# Patient Record
Sex: MALE | Race: WHITE | NOT HISPANIC OR LATINO | Employment: OTHER | URBAN - NONMETROPOLITAN AREA
[De-identification: names, ages, dates, MRNs, and addresses within clinical notes are randomized per-mention and may not be internally consistent; named-entity substitution may affect disease eponyms.]

---

## 2017-02-08 ENCOUNTER — TELEPHONE (OUTPATIENT)
Dept: CARDIOLOGY | Facility: CLINIC | Age: 82
End: 2017-02-08

## 2017-02-08 NOTE — TELEPHONE ENCOUNTER
----- Message from Cheryl Lawrence sent at 2/8/2017  9:31 AM EST -----  Contact: MAGDALENA (DAUGHTER)  THE PATIENTS DAUGHTER CALLED WANTING TO KNOW IF WE HAD ANY RENEXA SAMPLES FOR HIM.  SHE CAN BE REACHED [phone].  THANKS      Samples provided.

## 2017-03-03 ENCOUNTER — OFFICE VISIT (OUTPATIENT)
Dept: CARDIOLOGY | Facility: CLINIC | Age: 82
End: 2017-03-03

## 2017-03-03 DIAGNOSIS — I49.5 SICK SINUS SYNDROME (HCC): Primary | ICD-10-CM

## 2017-03-03 PROCEDURE — 93288 INTERROG EVL PM/LDLS PM IP: CPT | Performed by: INTERNAL MEDICINE

## 2017-03-08 ENCOUNTER — OFFICE VISIT (OUTPATIENT)
Dept: CARDIOLOGY | Facility: CLINIC | Age: 82
End: 2017-03-08

## 2017-03-08 VITALS
HEART RATE: 84 BPM | DIASTOLIC BLOOD PRESSURE: 55 MMHG | HEIGHT: 68 IN | SYSTOLIC BLOOD PRESSURE: 98 MMHG | WEIGHT: 140.4 LBS | OXYGEN SATURATION: 100 % | BODY MASS INDEX: 21.28 KG/M2

## 2017-03-08 DIAGNOSIS — R42 DIZZINESS: ICD-10-CM

## 2017-03-08 DIAGNOSIS — I20.9 ANGINA PECTORIS (HCC): ICD-10-CM

## 2017-03-08 DIAGNOSIS — I25.10 3-VESSEL CAD: Primary | ICD-10-CM

## 2017-03-08 DIAGNOSIS — R53.82 CHRONIC FATIGUE: ICD-10-CM

## 2017-03-08 DIAGNOSIS — R06.02 SHORTNESS OF BREATH: ICD-10-CM

## 2017-03-08 DIAGNOSIS — E78.5 DYSLIPIDEMIA: ICD-10-CM

## 2017-03-08 DIAGNOSIS — I95.89 OTHER SPECIFIED HYPOTENSION: ICD-10-CM

## 2017-03-08 DIAGNOSIS — I10 ESSENTIAL HYPERTENSION: ICD-10-CM

## 2017-03-08 PROCEDURE — 99214 OFFICE O/P EST MOD 30 MIN: CPT | Performed by: INTERNAL MEDICINE

## 2017-03-08 RX ORDER — ATENOLOL 25 MG/1
12.5 TABLET ORAL DAILY
COMMUNITY
Start: 2017-01-27 | End: 2017-03-08

## 2017-03-08 RX ORDER — SENNOSIDES 8.6 MG
CAPSULE ORAL
COMMUNITY
Start: 2015-08-05 | End: 2018-04-16

## 2017-03-08 RX ORDER — DIMENHYDRINATE 50 MG
100 TABLET ORAL 2 TIMES DAILY
Refills: 0
Start: 2017-03-08 | End: 2022-03-09

## 2017-03-08 RX ORDER — ISOSORBIDE MONONITRATE 30 MG/1
30 TABLET, EXTENDED RELEASE ORAL DAILY
Qty: 30 TABLET | Refills: 11 | Status: SHIPPED | OUTPATIENT
Start: 2017-03-08 | End: 2018-03-13 | Stop reason: SINTOL

## 2017-03-08 NOTE — PROGRESS NOTES
"Subjective   Steven López is a 89 y.o. male     Chief Complaint   Patient presents with   • Follow-up     hosp. f/u       PROBLEM LIST:     1. Coronary artery disease with multiple pci's in the past.  1.1 CABG, March 2003 with LIMA to LAD, saphenous vein graft of first and second posterolateral marginal circumflex.  1.2 Repeat CABG, October 2007 with autologous saphenous vein to the LAD and diagonal vessels.  1.3 Follow up stress in August 2015 demonstrating posterolateral ischemia  1.3 History of multiple percutaneous interventions.  2. 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 Hypertension  4. Dyslipidemia  5. Dizziness   6. Fatigue       Specialty Problems        Cardiology Problems    3-vessel CAD        Angina pectoris        Hypertension        Hypotension                HPI:  Mr. López returns for follow-up on coronary artery disease.    He never stopped atenolol as was requested at the time of his last visit, and he has had intermittent episodes of hypotension.  He was in the emergency room and [facility] last night with dehydration and hypotension.  He was given intravenous fluid resuscitation returned home.    The patient describes episodes of heaviness and numbness in his feet which spread in a symmetric fashion up to his legs and abdomen.  This eventually reaches his chest and he will develop chest pain and shortness of breath typical of his prior angina.  He had these symptoms prior to presenting to the emergency room.    Mr. López is no orthopnea, PND, or edema.  He is only mildly orthostatic symptomatology.  He still complains of generalized weakness and fatigue and exertional dyspnea.  He also relates that his legs were \"draw up\" if he performs any type of lower extremity activity for a prolonged period.                        CURRENT MEDICATION:    Current Outpatient Prescriptions   Medication Sig Dispense Refill "   • acetaminophen (ARTHRITIS PAIN RELIEVER) 650 MG 8 hr tablet Take  by mouth. prn     • ALPRAZolam (XANAX) 0.5 MG tablet Take  by mouth. 1/2 tab PRN     • aspirin (ASPIRIN LOW DOSE) 81 MG EC tablet Take  by mouth daily.     • atorvastatin (LIPITOR) 10 MG tablet daily.     • Calcium-Magnesium-Vitamin D - MG-MG-UNIT tablet sustained-release 24 hour Take  by mouth. Doesn't take every day     • dorzolamide (TRUSOPT) 2 % ophthalmic solution daily.     • HYDROcodone-acetaminophen (NORCO) 5-325 MG per tablet Take  by mouth. prn     • latanoprost (XALATAN) 0.005 % ophthalmic solution Apply 125 mcg to eye daily.     • levothyroxine (SYNTHROID, LEVOTHROID) 50 MCG tablet Take  by mouth daily.     • omeprazole (PriLOSEC) 20 MG capsule daily.     • Polyethylene Glycol 3350 (MIRALAX PO) Take  by mouth Daily As Needed.     • ranolazine (RANEXA) 1000 MG 12 hr tablet Take 1 tablet by mouth every 12 (twelve) hours. 60 tablet 5   • Coenzyme Q10 (CO Q-10) 100 MG capsule Take 100 mg by mouth 2 (Two) Times a Day.  0   • isosorbide mononitrate (IMDUR) 30 MG 24 hr tablet Take 1 tablet by mouth Daily. In the am 30 tablet 11   • nitroglycerin (NITROSTAT) 0.4 MG SL tablet Place  under the tongue.       No current facility-administered medications for this visit.        ALLERGIES:    Penicillins and Sulfa antibiotics    PAST MEDICAL HISTORY:    Past Medical History   Diagnosis Date   • 3-vessel CAD    • Angina pectoris    • Dyslipidemia    • Hypertension        SURGICAL HISTORY:    Past Surgical History   Procedure Laterality Date   • Coronary artery bypass graft       x2   • Prostate surgery     • Tonsillectomy     • Cardiac catheterization     • Coronary stent placement     • Insert / replace / remove pacemaker         SOCIAL HISTORY:    Social History     Social History   • Marital status:      Spouse name: N/A   • Number of children: N/A   • Years of education: N/A     Occupational History   • Not on file.     Social  "History Main Topics   • Smoking status: Former Smoker   • Smokeless tobacco: Not on file   • Alcohol use No   • Drug use: No   • Sexual activity: Not on file     Other Topics Concern   • Not on file     Social History Narrative       FAMILY HISTORY:    Family History   Problem Relation Age of Onset   • Stroke Mother    • Stroke Father    • Other Brother      CABG   • COPD Brother      Pulmonary Disease   • Stroke Other        Review of Systems   Constitutional: Positive for fatigue. Negative for appetite change, chills, diaphoresis and fever.   HENT: Negative.    Eyes: Positive for visual disturbance (wears glasses prn).   Respiratory: Positive for shortness of breath (no orthopnea or PND). Negative for chest tightness.    Cardiovascular: Positive for chest pain (was seen in ER last night at [facility]) and leg swelling (mildly, occas. in ankles).   Gastrointestinal: Negative.  Negative for abdominal pain, blood in stool, constipation, diarrhea, nausea and vomiting.   Endocrine: Negative.  Negative for cold intolerance and heat intolerance.   Genitourinary: Negative.    Musculoskeletal: Positive for arthralgias, gait problem (ambulates with cane) and myalgias.   Skin: Negative.    Allergic/Immunologic: Negative.    Neurological: Positive for dizziness (onset when starting ranexa,  fell last month and broke collar bone ), weakness (pt was dehydrated yesterday was given fluids at [facility], imbalance ) and light-headedness (since started Ranexa).   Hematological: Bruises/bleeds easily.   Psychiatric/Behavioral: Negative.        VISIT VITALS:    Visit Vitals   • BP 98/55 (BP Location: Left arm, Patient Position: Sitting)   • Pulse 84   • Ht 68\" (172.7 cm)   • Wt 140 lb 6.4 oz (63.7 kg)   • SpO2 100%   • BMI 21.35 kg/m2       RECENT LABS:    Objective       Physical Exam   Constitutional: He is oriented to person, place, and time. He appears well-developed and well-nourished. No distress.   HENT:   Head: Normocephalic and " atraumatic.   Eyes: Conjunctivae and EOM are normal. Pupils are equal, round, and reactive to light.   Neck: Normal range of motion. Neck supple. Normal carotid pulses, no hepatojugular reflux and no JVD present. Carotid bruit is not present. No thyroid mass and no thyromegaly present.   Cardiovascular: Intact distal pulses and normal pulses.  Exam reveals distant heart sounds. Exam reveals no gallop, no friction rub and no decreased pulses.    No murmur heard.  Pulmonary/Chest: Effort normal. No respiratory distress. He has decreased breath sounds (moderately decreased breath sounds, exp, phase normal ). He has no wheezes. He has no rales. He exhibits no tenderness.   Abdominal: Soft. Bowel sounds are normal. He exhibits no distension and no mass. There is no tenderness.   Musculoskeletal: Normal range of motion. He exhibits no edema.   Neurological: He is alert and oriented to person, place, and time. He has normal reflexes.   Skin: Skin is warm and dry. No rash noted. He is not diaphoretic. No erythema. No pallor.   Psychiatric: He has a normal mood and affect. His speech is normal and behavior is normal. Judgment and thought content normal. Cognition and memory are normal.   Nursing note and vitals reviewed.      Procedures      Assessment/Plan    #1.  Mr. López continues to have orthostatic hypotension, some of which is iatrogenic in nature.  Therefore, we'll again attempt to stop his atenolol.  I cautioned the patient, his son, and his daughter that this might worsen angina.  However, as Mr. López has angina when his pressures are low, we may actually achieve some improvement by increasing perfusion pressure in the coronary vascular bed.    #2.  Mr. López has minor skin reaction to his nitroglycerin patch.  Therefore we will stop that medication and start isosorbide 30 mg daily uptitrated as tolerated and to affect.    #3.  Because of imbalance and falls we will stop Plavix.    #4.  We will start coenzyme  Q10 supplementation to address the patient's leg cramps.    #5.  Mr. López will continue other medications, will follow-up with Dr. Camargo as instructed, and in our office next week for an a when necessary basis for symptoms or medication intolerance as discussed in detail today.                   Diagnosis Plan   1. 3-vessel CAD     2. Essential hypertension     3. Dyslipidemia     4. Dizziness     5. Chronic fatigue     6. Angina pectoris     7. Other specified hypotension     8. Shortness of breath         Return in about 5 days (around 3/13/2017) for return on monday 03/13/17 to see dr. holder .         Fish Holder MD

## 2017-03-13 ENCOUNTER — OFFICE VISIT (OUTPATIENT)
Dept: CARDIOLOGY | Facility: CLINIC | Age: 82
End: 2017-03-13

## 2017-03-13 VITALS
BODY MASS INDEX: 20.92 KG/M2 | OXYGEN SATURATION: 99 % | DIASTOLIC BLOOD PRESSURE: 56 MMHG | HEART RATE: 74 BPM | WEIGHT: 138 LBS | SYSTOLIC BLOOD PRESSURE: 101 MMHG | HEIGHT: 68 IN

## 2017-03-13 DIAGNOSIS — R53.82 CHRONIC FATIGUE: ICD-10-CM

## 2017-03-13 DIAGNOSIS — I25.10 3-VESSEL CAD: Primary | ICD-10-CM

## 2017-03-13 DIAGNOSIS — I10 ESSENTIAL HYPERTENSION: ICD-10-CM

## 2017-03-13 DIAGNOSIS — I95.89 OTHER SPECIFIED HYPOTENSION: ICD-10-CM

## 2017-03-13 DIAGNOSIS — R42 DIZZINESS: ICD-10-CM

## 2017-03-13 DIAGNOSIS — R06.02 SHORTNESS OF BREATH: ICD-10-CM

## 2017-03-13 DIAGNOSIS — E78.5 DYSLIPIDEMIA: ICD-10-CM

## 2017-03-13 PROCEDURE — 99214 OFFICE O/P EST MOD 30 MIN: CPT | Performed by: INTERNAL MEDICINE

## 2017-04-10 ENCOUNTER — TELEPHONE (OUTPATIENT)
Dept: CARDIOLOGY | Facility: CLINIC | Age: 82
End: 2017-04-10

## 2017-04-10 NOTE — TELEPHONE ENCOUNTER
----- Message from Cheryl Lawrence sent at 4/10/2017 11:41 AM EDT -----  Contact: MAGDALENA  THE PATIENTS DAUGHTER CALLED AND REQUESTED SAMPLES OF RENEXA.  SHE IS COMING TO [place]OWN TOMORROW AND WOULD LIKE TO PICK THEM UP THEN IF WE HAVE THOSE.  SHE CAN BE REACHED [phone].  THANKS      Samples provided.

## 2017-05-18 ENCOUNTER — TELEPHONE (OUTPATIENT)
Dept: CARDIOLOGY | Facility: CLINIC | Age: 82
End: 2017-05-18

## 2017-06-12 ENCOUNTER — TELEPHONE (OUTPATIENT)
Dept: CARDIOLOGY | Facility: CLINIC | Age: 82
End: 2017-06-12

## 2017-06-12 NOTE — TELEPHONE ENCOUNTER
----- Message from Cheryl Lawrence sent at 6/12/2017 10:18 AM EDT -----  Contact: MAGDALENA (DAUGHTER)  THE PATIENTS DAUGHTER CALLED WANTING TO GET RENEXA SAMPLES.  SHE IS COMING TO TOWN LATER TODAY.  SHE CAN BE REACHED [phone].  THANKS      Samples provided for .

## 2017-07-05 ENCOUNTER — TELEPHONE (OUTPATIENT)
Dept: CARDIOLOGY | Facility: CLINIC | Age: 82
End: 2017-07-05

## 2017-07-05 NOTE — TELEPHONE ENCOUNTER
----- Message from Sivakumar Roberson sent at 7/5/2017 10:57 AM EDT -----  Contact: MAGDALENA LAM  DAUGHTER  PT IS REQUESTING SAMPLES OF HIS RANEXA IF AVAILABLE. DAUGHTER WILL BE AT [place] TODAY IF WE SAMPLES ARE AVAILABLE . PLEASE CALL [phone]    Samples provided for patient pickup.

## 2017-08-09 ENCOUNTER — TELEPHONE (OUTPATIENT)
Dept: CARDIOLOGY | Facility: CLINIC | Age: 82
End: 2017-08-09

## 2017-08-09 NOTE — TELEPHONE ENCOUNTER
ranexa 1000 mg samples have been put back in med closet for pat and daughter will pick those up tomorrow.     ----- Message from Valerie Wasserman sent at 8/9/2017  9:43 AM EDT -----  Contact: PT DAUGHTERMAGDALENA  WANTING TO KNOW IF THEY CAN  RANEXA SAMPLES LATER TODAY WHEN THEY ARE IN TOWN. PLEASE CALL [phone]

## 2017-09-11 ENCOUNTER — OFFICE VISIT (OUTPATIENT)
Dept: CARDIOLOGY | Facility: CLINIC | Age: 82
End: 2017-09-11

## 2017-09-11 VITALS
HEIGHT: 68 IN | WEIGHT: 138 LBS | HEART RATE: 78 BPM | DIASTOLIC BLOOD PRESSURE: 59 MMHG | OXYGEN SATURATION: 94 % | BODY MASS INDEX: 20.92 KG/M2 | SYSTOLIC BLOOD PRESSURE: 99 MMHG

## 2017-09-11 DIAGNOSIS — M62.81 MUSCLE WEAKNESS: ICD-10-CM

## 2017-09-11 DIAGNOSIS — I49.5 SSS (SICK SINUS SYNDROME) (HCC): ICD-10-CM

## 2017-09-11 DIAGNOSIS — E78.5 DYSLIPIDEMIA: ICD-10-CM

## 2017-09-11 DIAGNOSIS — I25.10 3-VESSEL CAD: Primary | ICD-10-CM

## 2017-09-11 DIAGNOSIS — I95.89 OTHER SPECIFIED HYPOTENSION: ICD-10-CM

## 2017-09-11 DIAGNOSIS — R26.89 IMBALANCE: ICD-10-CM

## 2017-09-11 DIAGNOSIS — R53.82 CHRONIC FATIGUE: ICD-10-CM

## 2017-09-11 DIAGNOSIS — R42 DIZZINESS: ICD-10-CM

## 2017-09-11 DIAGNOSIS — I10 ESSENTIAL HYPERTENSION: ICD-10-CM

## 2017-09-11 DIAGNOSIS — I20.9 ANGINA PECTORIS (HCC): ICD-10-CM

## 2017-09-11 DIAGNOSIS — M79.605 PAIN IN BOTH LOWER EXTREMITIES: ICD-10-CM

## 2017-09-11 DIAGNOSIS — R06.02 SHORTNESS OF BREATH: ICD-10-CM

## 2017-09-11 DIAGNOSIS — M79.604 PAIN IN BOTH LOWER EXTREMITIES: ICD-10-CM

## 2017-09-11 PROCEDURE — 99214 OFFICE O/P EST MOD 30 MIN: CPT | Performed by: INTERNAL MEDICINE

## 2017-09-11 PROCEDURE — 93280 PM DEVICE PROGR EVAL DUAL: CPT | Performed by: INTERNAL MEDICINE

## 2017-09-11 RX ORDER — PRAVASTATIN SODIUM 20 MG
20 TABLET ORAL DAILY
COMMUNITY
End: 2018-03-13

## 2017-09-11 NOTE — PROGRESS NOTES
Subjective   Steven López is a 89 y.o. male     Chief Complaint   Patient presents with   • Follow-up     patient appears in office today for 6 month follow up with PPM check ([facility])   • Hyperlipidemia     patient and daughter state he had recent blood work (brought wiht them today ) and that cholesterol was WNL, triglycerides were elevated but were better than   • Chest Pain     patient states he does have occasional L sided CP; patient states this pain is fast acting and is at random; occasional Nitro use if it does not resolve on its own   • Shortness of Breath     patient states he has SOA on exertion only ; resolves once he rest   • Pacemaker Check     patient had PPM check today in office    • Dizziness     patient states he still has occasional dizzy episodes while sitting       PROBLEM LIST:     1. Coronary artery disease with multiple pci's in the past.  1.1 CABG, March 2003 with LIMA to LAD, saphenous vein graft of first and second posterolateral marginal circumflex.  1.2 Repeat CABG, October 2007 with autologous saphenous vein to the LAD and diagonal vessels.  1.3 Follow up stress in August 2015 demonstrating posterolateral ischemia  1.3 History of multiple percutaneous interventions.  2. 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 Hypertension  4. Dyslipidemia  5. Dizziness   6. Fatigue          Specialty Problems        Cardiology Problems    3-vessel CAD        Angina pectoris        Hypertension        Hypotension                HPI:  Mr. López returns for follow-up on coronary artery disease, chronic dizziness and fatigue, and cardiac risk factor modification.    From the standpoint of coronary artery disease Mr. López remains stable.  He has angina, on average, about once a week.  This is usually precipitated by emotional stress rather than physical activity.  Symptoms are no more frequent, prolonged, or intense than they have  been at any time in the past.  Symptoms are always relieved by a single nitroglycerin which Mr. López takes frequently, but not always, when he developed chest discomfort.    The patient relates no orthopnea, PND, or lower extremity edema.  He has no palpitations.  Mr. López has persistent orthostatic lightheadedness which initially improved off the atenolol but is returned back to baseline now he's not been presyncopal or syncopal.  He also complains of dizziness.  I'm pursuing that symptom it appears that the patient has more imbalance than true dizziness.  He has had no falls.    Mr. López denies any symptoms of peripheral arterial disease or arterial embolic events.  In particular, he has had no symptoms to suggest TIA or stroke.    The recent labs were reviewed H shows 38 LDL 76.  Creatinine was 1.4.  TSH was normal.                CURRENT MEDICATION:    Current Outpatient Prescriptions   Medication Sig Dispense Refill   • acetaminophen (ARTHRITIS PAIN RELIEVER) 650 MG 8 hr tablet Take  by mouth. prn     • ALPRAZolam (XANAX) 0.5 MG tablet Take  by mouth. 1/2 tab PRN     • aspirin (ASPIRIN LOW DOSE) 81 MG EC tablet Take 81 mg by mouth Daily.     • Calcium-Magnesium-Vitamin D - MG-MG-UNIT tablet sustained-release 24 hour Take  by mouth. Doesn't take every day     • Coenzyme Q10 (CO Q-10) 100 MG capsule Take 100 mg by mouth 2 (Two) Times a Day.  0   • dorzolamide (TRUSOPT) 2 % ophthalmic solution Administer 1 drop to both eyes Daily.     • HYDROcodone-acetaminophen (NORCO) 5-325 MG per tablet Take 1 tablet by mouth Every 8 (Eight) Hours As Needed for Moderate Pain . prn     • isosorbide mononitrate (IMDUR) 30 MG 24 hr tablet Take 1 tablet by mouth Daily. In the am 30 tablet 11   • latanoprost (XALATAN) 0.005 % ophthalmic solution Apply 125 mcg to eye daily.     • levothyroxine (SYNTHROID, LEVOTHROID) 50 MCG tablet Take 50 mcg by mouth Daily.     • nitroglycerin (NITROSTAT) 0.4 MG SL tablet Place   under the tongue.     • omeprazole (PriLOSEC) 20 MG capsule Take 20 mg by mouth Daily.     • Polyethylene Glycol 3350 (MIRALAX PO) Take  by mouth Daily As Needed.     • pravastatin (PRAVACHOL) 20 MG tablet Take 20 mg by mouth Daily.     • ranolazine (RANEXA) 1000 MG 12 hr tablet Take 1 tablet by mouth every 12 (twelve) hours. 60 tablet 5     No current facility-administered medications for this visit.        ALLERGIES:    Penicillins and Sulfa antibiotics    PAST MEDICAL HISTORY:    Past Medical History:   Diagnosis Date   • 3-vessel CAD    • Angina pectoris    • Dyslipidemia    • Hypertension        SURGICAL HISTORY:    Past Surgical History:   Procedure Laterality Date   • CARDIAC CATHETERIZATION     • CORONARY ARTERY BYPASS GRAFT      x2   • CORONARY STENT PLACEMENT     • INSERT / REPLACE / REMOVE PACEMAKER     • PROSTATE SURGERY     • TONSILLECTOMY         SOCIAL HISTORY:    Social History     Social History   • Marital status:      Spouse name: N/A   • Number of children: N/A   • Years of education: N/A     Occupational History   • Not on file.     Social History Main Topics   • Smoking status: Former Smoker   • Smokeless tobacco: Never Used   • Alcohol use No   • Drug use: No   • Sexual activity: Defer     Other Topics Concern   • Not on file     Social History Narrative       FAMILY HISTORY:    Family History   Problem Relation Age of Onset   • Stroke Mother    • Stroke Father    • Other Brother      CABG   • COPD Brother      Pulmonary Disease   • Stroke Other        Review of Systems   Constitutional: Negative for chills, diaphoresis, fatigue and fever.   HENT: Negative.    Eyes: Positive for visual disturbance (wears glasses PRN).   Respiratory: Positive for shortness of breath (SOA on exertion only). Negative for cough, chest tightness and wheezing.    Cardiovascular: Positive for chest pain (occasional L sided chest pain; does not radiate; resolves quickly on its own if not pt will take Nitro ,  "episode frequency once a week, will take nitro once a week ) and leg swelling (occasional BLE swelling). Negative for palpitations.   Gastrointestinal: Negative.  Negative for abdominal pain, blood in stool (no melena, no hematuria, no hematemesis, no hematochezia), constipation, diarrhea, nausea and vomiting.   Endocrine: Negative.  Negative for cold intolerance, heat intolerance, polyphagia and polyuria.   Genitourinary: Positive for frequency (due to overactive bladder ). Negative for difficulty urinating and urgency.   Musculoskeletal: Positive for arthralgias (legs/fingers), gait problem (ambulates with aid of cane) and myalgias. Negative for back pain, neck pain and neck stiffness.   Skin: Negative.  Negative for rash and wound.   Allergic/Immunologic: Negative.  Negative for environmental allergies and food allergies.   Neurological: Positive for dizziness (pt states he is still having occasional episodes of dizziness while sitting and standing, described as imbalance and orthostatic lightheadedness ). Negative for tremors, seizures, syncope, facial asymmetry (no stroke like symptoms), speech difficulty, weakness, light-headedness, numbness and headaches.   Hematological: Negative.  Does not bruise/bleed easily.   Psychiatric/Behavioral: Negative.  Negative for agitation, confusion and sleep disturbance (denies waking up smothering). The patient is not nervous/anxious.        VISIT VITALS:    BP 99/59 (BP Location: Left arm, Patient Position: Sitting)  Pulse 78  Ht 68\" (172.7 cm)  Wt 138 lb (62.6 kg)  SpO2 94%  BMI 20.98 kg/m2    RECENT LABS:    Objective       Physical Exam   Constitutional: He is oriented to person, place, and time. He appears well-developed and well-nourished. No distress.   HENT:   Head: Normocephalic and atraumatic.   Eyes: Conjunctivae, EOM and lids are normal. Pupils are equal, round, and reactive to light. Lids are everted and swept, no foreign bodies found.   Neck: Normal range " of motion. Neck supple. Normal carotid pulses, no hepatojugular reflux and no JVD present. Carotid bruit is not present (normal carotid uptrokes). No tracheal deviation present. No thyroid mass and no thyromegaly present.   Cardiovascular: Normal rate, regular rhythm, S1 normal, S2 normal, normal heart sounds and intact distal pulses.  Exam reveals no gallop, no S3, no S4 and no distant heart sounds.    No murmur heard.  Pulmonary/Chest: Effort normal and breath sounds normal. No respiratory distress. He has no decreased breath sounds. He has no wheezes. He has no rhonchi. He has no rales. He exhibits no tenderness.   Abdominal: Soft. Bowel sounds are normal. He exhibits no distension, no abdominal bruit and no mass. There is no tenderness. There is no rebound and no guarding.   Negative organomegaly      Musculoskeletal: Normal range of motion. He exhibits edema (trace edema LLE). He exhibits no tenderness or deformity.   Lymphadenopathy:     He has no cervical adenopathy.     He has no axillary adenopathy.   Neurological: He is alert and oriented to person, place, and time.   Skin: Skin is warm and dry. No rash noted. No erythema. No pallor.   Psychiatric: He has a normal mood and affect. His speech is normal and behavior is normal. Judgment and thought content normal. Cognition and memory are normal.   Nursing note and vitals reviewed.      Procedures      Assessment/Plan    #1 coronary artery disease status post coronary artery bypass grafting on 2 separate occasions with stable, relatively infrequent angina.  Without high risk markers on stress testing, and was stable symptoms, I don't think that further evaluation warranted.  However, I think we are very limited in our ability to address angina further based on the patient's hypotension and orthostatic symptomatology.    #2.  Orthostatic lightheadedness.  Mr. López will practice physical measures to minimize the risk of orthostasis, and ensure adequate  hydration.    #3.  Imbalance.  I suspect that this is a combination of both peripheral neuropathy is as the patient describes symptoms significant for that pathology) and generalized muscle weakness and atrophy.  I suggested the patient might benefit from neurologic consultation to see if any treatment might result of more benefit than risk of worsening symptoms.    #4.  Therefore, we will continue current medications and close clinical follow-up.  Mr. López understands to report immediately for any change in his angina, orthostatic symptomatology, or any symptoms which might represent TIA or stroke.  He will see Dr. Camargo as instructed through his office, and will follow-up in our office in 6 months or on a when necessary basis as above.                   Diagnosis Plan   1. 3-vessel CAD     2. Other specified hypotension     3. Dyslipidemia     4. Dizziness  Ambulatory Referral to Neurology   5. Chronic fatigue     6. Shortness of breath     7. Angina pectoris     8. Muscle weakness  Ambulatory Referral to Neurology   9. Pain in both lower extremities  Ambulatory Referral to Neurology   10. Imbalance  Ambulatory Referral to Neurology   11. Essential hypertension     12. SSS (sick sinus syndrome)         Return in about 6 months (around 3/11/2018), or if symptoms worsen or fail to improve, for Next scheduled follow up.         Fish Holder MD   Present for Exam and scribed by YAZMIN Hallman

## 2017-09-12 ENCOUNTER — TELEPHONE (OUTPATIENT)
Dept: CARDIOLOGY | Facility: CLINIC | Age: 82
End: 2017-09-12

## 2017-09-12 DIAGNOSIS — M79.605 PAIN IN BOTH LOWER EXTREMITIES: ICD-10-CM

## 2017-09-12 DIAGNOSIS — M62.81 MUSCLE WEAKNESS: Primary | ICD-10-CM

## 2017-09-12 DIAGNOSIS — M79.604 PAIN IN BOTH LOWER EXTREMITIES: ICD-10-CM

## 2017-09-12 DIAGNOSIS — R26.89 IMBALANCE: ICD-10-CM

## 2017-09-12 DIAGNOSIS — R42 DIZZINESS: ICD-10-CM

## 2017-09-12 NOTE — TELEPHONE ENCOUNTER
Spoke with Pauline, pt daughter, she stats Mr. López prefers to see Dr. Rodas and is willing to wait on seeing him. Referral to Dr. Nielson will be cancelled and new order entered for referral to Dr. Rodas.       ----- Message from Concepcion Forrest sent at 9/12/2017 10:20 AM EDT -----  Steven' daughter came by the office this morning and said that he doesn't want to make the trip to [place] to see Neurology. The referral entered yesterday has already been used, Dr. Pagan has him scheduled for Oct. 19. Do they need to cancel this appointment now and a new referral entered for him to see Dr. Rodas?

## 2017-09-13 ENCOUNTER — TELEPHONE (OUTPATIENT)
Dept: CARDIOLOGY | Facility: CLINIC | Age: 82
End: 2017-09-13

## 2017-09-13 NOTE — TELEPHONE ENCOUNTER
----- Message from Concepcion Forrest sent at 9/13/2017  8:48 AM EDT -----  Called Rodas's office to schedule him, they don't have schedules open until November and told me to call back then when they might have them open to schedule patients. I called Pauline and let her know what I was told re: scheduling, she stated she understands and they will decide how to proceed in November when Rodas's schedules will open back up.     ----- Message -----     From: Dolly Underwood MA     Sent: 9/12/2017  11:24 AM       To: Concepcion Forrest    Spoke with Pauline, pt daughter, states Mr. López would like ro proceed with referral to Dr. Rodas. Cancel Dr. Pagan apt. And scheduled with Clark. Patient is willing to wait to see Dr. Rodas.  Thanks       ----- Message -----     From: Concepcion Forrest     Sent: 9/12/2017  10:20 AM       To: Dolly Underwood MA    Steven' daughter came by the office this morning and said that he doesn't want to make the trip to [place] to see Neurology. The referral entered yesterday has already been used, Dr. Pagan has him scheduled for Oct. 19. Do they need to cancel this appointment now and a new referral entered for him to see Dr. Rodas?

## 2017-09-13 NOTE — TELEPHONE ENCOUNTER
A cardiac clearance has been faxed to Dr. Masterson for extraction of skin lesion to the right ear 09/13/17 @ 5:23pm SONNY/YAZMIN

## 2017-10-24 ENCOUNTER — OFFICE VISIT (OUTPATIENT)
Dept: NEUROLOGY | Facility: CLINIC | Age: 82
End: 2017-10-24

## 2017-10-24 ENCOUNTER — LAB (OUTPATIENT)
Dept: LAB | Facility: HOSPITAL | Age: 82
End: 2017-10-24

## 2017-10-24 VITALS
SYSTOLIC BLOOD PRESSURE: 110 MMHG | BODY MASS INDEX: 21.07 KG/M2 | HEIGHT: 68 IN | HEART RATE: 73 BPM | DIASTOLIC BLOOD PRESSURE: 54 MMHG | WEIGHT: 139 LBS | OXYGEN SATURATION: 97 %

## 2017-10-24 DIAGNOSIS — M79.661 PAIN IN BOTH LOWER LEGS: Primary | ICD-10-CM

## 2017-10-24 DIAGNOSIS — M79.661 PAIN IN BOTH LOWER LEGS: ICD-10-CM

## 2017-10-24 DIAGNOSIS — M79.662 PAIN IN BOTH LOWER LEGS: ICD-10-CM

## 2017-10-24 DIAGNOSIS — M79.662 PAIN IN BOTH LOWER LEGS: Primary | ICD-10-CM

## 2017-10-24 LAB
CK SERPL-CCNC: 69 U/L (ref 26–174)
ERYTHROCYTE [SEDIMENTATION RATE] IN BLOOD: 3 MM/HR (ref 0–20)

## 2017-10-24 PROCEDURE — 83519 RIA NONANTIBODY: CPT | Performed by: PSYCHIATRY & NEUROLOGY

## 2017-10-24 PROCEDURE — 82550 ASSAY OF CK (CPK): CPT | Performed by: PSYCHIATRY & NEUROLOGY

## 2017-10-24 PROCEDURE — 86255 FLUORESCENT ANTIBODY SCREEN: CPT | Performed by: PSYCHIATRY & NEUROLOGY

## 2017-10-24 PROCEDURE — 85652 RBC SED RATE AUTOMATED: CPT | Performed by: PSYCHIATRY & NEUROLOGY

## 2017-10-24 PROCEDURE — 99205 OFFICE O/P NEW HI 60 MIN: CPT | Performed by: PSYCHIATRY & NEUROLOGY

## 2017-10-24 PROCEDURE — 36415 COLL VENOUS BLD VENIPUNCTURE: CPT

## 2017-10-24 NOTE — ASSESSMENT & PLAN NOTE
Pain and weakness in LE worsening in last year.    EMG/NCS of the bilateral lower extremities,    Labs     Stop Xanax and Lortab

## 2017-10-24 NOTE — PROGRESS NOTES
Subjective:   Chief Complaint   Patient presents with   • Uncontrollable Muscle Weakness       Patient ID: Steven López is a 89 y.o. male.    History of Present Illness     89 y.o. male referred by Dr Fish Holder for muscle weakness.  LE are weak and have constant N/T in the last year.  Several episodes of legs not wanting to move after sitting.  Taking hydrocodone for arthritis pain.  Sx of LH worsened after taking Ranexa, Pravastatin started in April 2017, after stopping Lipitor.      Reviewed Dr Holder's notes:    CAD s/p CABG and redo, orthostatic LH, DDDR generator, angina once a week,    Labs - CMP, iron, cbc, Vit D, TSH, B12 - NCS       The following portions of the patient's history were reviewed and updated as appropriate:   He  has a past medical history of 3-vessel CAD; Angina pectoris; Arthritis; Dyslipidemia; Glaucoma; and Hypertension.  He  does not have any pertinent problems on file.  He  has a past surgical history that includes Coronary artery bypass graft; ostate surgery; Tonsillectomy; Cardiac catheterization; Coronary stent placement; and Insert / replace / remove pacemaker.  His family history includes COPD in his brother; Other in his brother; Stroke in his father, mother, and other.  He  reports that he has quit smoking. He has never used smokeless tobacco. He reports that he does not drink alcohol or use illicit drugs.  Current Outpatient Prescriptions   Medication Sig Dispense Refill   • acetaminophen (ARTHRITIS PAIN RELIEVER) 650 MG 8 hr tablet Take  by mouth. prn     • ALPRAZolam (XANAX) 0.5 MG tablet Take  by mouth. 1/2 tab PRN     • aspirin (ASPIRIN LOW DOSE) 81 MG EC tablet Take 81 mg by mouth Daily.     • Calcium-Magnesium-Vitamin D - MG-MG-UNIT tablet sustained-release 24 hour Take  by mouth. Doesn't take every day     • Coenzyme Q10 (CO Q-10) 100 MG capsule Take 100 mg by mouth 2 (Two) Times a Day.  0   • dorzolamide (TRUSOPT) 2 % ophthalmic solution Administer 1  drop to both eyes Daily.     • HYDROcodone-acetaminophen (NORCO) 5-325 MG per tablet Take 1 tablet by mouth Every 8 (Eight) Hours As Needed for Moderate Pain . prn     • isosorbide mononitrate (IMDUR) 30 MG 24 hr tablet Take 1 tablet by mouth Daily. In the am 30 tablet 11   • latanoprost (XALATAN) 0.005 % ophthalmic solution Apply 125 mcg to eye daily.     • levothyroxine (SYNTHROID, LEVOTHROID) 50 MCG tablet Take 50 mcg by mouth Daily.     • nitroglycerin (NITROSTAT) 0.4 MG SL tablet Place  under the tongue.     • omeprazole (PriLOSEC) 20 MG capsule Take 20 mg by mouth Daily.     • Polyethylene Glycol 3350 (MIRALAX PO) Take  by mouth Daily As Needed.     • pravastatin (PRAVACHOL) 20 MG tablet Take 20 mg by mouth Daily.     • ranolazine (RANEXA) 1000 MG 12 hr tablet Take 1 tablet by mouth every 12 (twelve) hours. 60 tablet 5     No current facility-administered medications for this visit.      Current Outpatient Prescriptions on File Prior to Visit   Medication Sig   • acetaminophen (ARTHRITIS PAIN RELIEVER) 650 MG 8 hr tablet Take  by mouth. prn   • ALPRAZolam (XANAX) 0.5 MG tablet Take  by mouth. 1/2 tab PRN   • aspirin (ASPIRIN LOW DOSE) 81 MG EC tablet Take 81 mg by mouth Daily.   • Calcium-Magnesium-Vitamin D - MG-MG-UNIT tablet sustained-release 24 hour Take  by mouth. Doesn't take every day   • Coenzyme Q10 (CO Q-10) 100 MG capsule Take 100 mg by mouth 2 (Two) Times a Day.   • dorzolamide (TRUSOPT) 2 % ophthalmic solution Administer 1 drop to both eyes Daily.   • HYDROcodone-acetaminophen (NORCO) 5-325 MG per tablet Take 1 tablet by mouth Every 8 (Eight) Hours As Needed for Moderate Pain . prn   • isosorbide mononitrate (IMDUR) 30 MG 24 hr tablet Take 1 tablet by mouth Daily. In the am   • latanoprost (XALATAN) 0.005 % ophthalmic solution Apply 125 mcg to eye daily.   • levothyroxine (SYNTHROID, LEVOTHROID) 50 MCG tablet Take 50 mcg by mouth Daily.   • nitroglycerin (NITROSTAT) 0.4 MG SL tablet  "Place  under the tongue.   • omeprazole (PriLOSEC) 20 MG capsule Take 20 mg by mouth Daily.   • Polyethylene Glycol 3350 (MIRALAX PO) Take  by mouth Daily As Needed.   • pravastatin (PRAVACHOL) 20 MG tablet Take 20 mg by mouth Daily.   • ranolazine (RANEXA) 1000 MG 12 hr tablet Take 1 tablet by mouth every 12 (twelve) hours.     No current facility-administered medications on file prior to visit.      He is allergic to penicillins and sulfa antibiotics..    Review of Systems   Constitutional: Positive for fatigue. Negative for activity change and unexpected weight change.   HENT: Negative for facial swelling, hearing loss, tinnitus, trouble swallowing and voice change.    Eyes: Negative for photophobia, pain and visual disturbance.   Respiratory: Negative for apnea, cough and choking.    Cardiovascular: Negative for chest pain.   Gastrointestinal: Negative for constipation, nausea and vomiting.   Endocrine: Negative for cold intolerance.   Genitourinary: Negative for difficulty urinating, frequency and urgency.   Musculoskeletal: Positive for gait problem. Negative for arthralgias, back pain, myalgias, neck pain and neck stiffness.   Skin: Negative for rash.   Allergic/Immunologic: Negative for immunocompromised state.   Neurological: Positive for dizziness and weakness. Negative for tremors, seizures, syncope, facial asymmetry, speech difficulty, light-headedness, numbness and headaches.   Hematological: Negative for adenopathy.   Psychiatric/Behavioral: Negative for confusion, decreased concentration, hallucinations and sleep disturbance. The patient is not nervous/anxious.         Objective:  Vitals:    10/24/17 1235   BP: 110/54   Pulse: 73   SpO2: 97%   Weight: 139 lb (63 kg)   Height: 68\" (172.7 cm)       Neurologic Exam     Mental Status   Oriented to person, place, and time.   Registration: recalls 3 of 3 objects. Recall at 5 minutes: recalls 3 of 3 objects. Follows 3 step commands.   Attention: normal. " Concentration: normal.   Speech: speech is normal   Level of consciousness: alert  Knowledge: good and consistent with education.   Able to name object. Able to read. Able to repeat. Able to write. Normal comprehension.     Cranial Nerves     CN II   Visual fields full to confrontation.   Visual acuity: normal  Right visual field deficit: none  Left visual field deficit: none     CN III, IV, VI   Pupils are equal, round, and reactive to light.  Extraocular motions are normal.   Right pupil: Shape: regular. Reactivity: brisk. Consensual response: intact.   Left pupil: Shape: regular. Reactivity: brisk. Consensual response: intact.   Nystagmus: none   Diplopia: none  Ophthalmoparesis: none  Upgaze: normal  Downgaze: normal  Conjugate gaze: present  Vestibulo-ocular reflex: present    CN V   Facial sensation intact.   Right corneal reflex: normal  Left corneal reflex: normal    CN VII   Right facial weakness: none  Left facial weakness: none    CN VIII   Hearing: intact    CN IX, X   Palate: symmetric  Right gag reflex: normal  Left gag reflex: normal    CN XI   Right sternocleidomastoid strength: normal  Left sternocleidomastoid strength: normal    CN XII   Tongue: not atrophic  Fasciculations: absent  Tongue deviation: none    Motor Exam   Muscle bulk: normal  Overall muscle tone: normal  Right arm tone: normal  Left arm tone: normal  Right leg tone: normal  Left leg tone: normal    Strength   Strength 5/5 throughout.   Right iliopsoas: 4/5  Left iliopsoas: 4/5  Right quadriceps: 4/5  Left quadriceps: 4/5  Right hamstrin/5  Left hamstrin/5  Right glutei: 4/5  Left glutei: 4/5  Right anterior tibial: 4/5  Left anterior tibial: 4/5  Right posterior tibial: 4/5  Left posterior tibial: 4/5  Right peroneal: 4/5  Left peroneal: 4/5  Right gastroc: 4/5  Left gastroc: 4/5    Sensory Exam   Light touch normal.   Vibration normal.   Proprioception normal.   Pinprick normal.     Gait, Coordination, and Reflexes      Gait  Gait: shuffling    Coordination   Romberg: negative  Finger to nose coordination: normal  Heel to shin coordination: normal  Tandem walking coordination: abnormal    Tremor   Resting tremor: absent  Intention tremor: absent  Action tremor: absent    Reflexes   Reflexes 2+ except as noted.       Physical Exam   Constitutional: He is oriented to person, place, and time. Vital signs are normal. He appears well-developed and well-nourished.   HENT:   Head: Normocephalic and atraumatic.   Eyes: EOM and lids are normal. Pupils are equal, round, and reactive to light.   Fundoscopic exam:       The right eye shows no exudate, no hemorrhage and no papilledema. The right eye shows venous pulsations.        The left eye shows no exudate, no hemorrhage and no papilledema. The left eye shows venous pulsations.   Neck: Normal range of motion and phonation normal. Normal carotid pulses present. Carotid bruit is not present. No thyroid mass and no thyromegaly present.   Cardiovascular: Normal rate, regular rhythm and normal heart sounds.    Pulmonary/Chest: Effort normal.   Neurological: He is oriented to person, place, and time. He has normal strength. He has an abnormal Tandem Gait Test. He has a normal Finger-Nose-Finger Test, a normal Heel to Velazquez Test and a normal Romberg Test.   Skin: Skin is warm and dry.   Psychiatric: He has a normal mood and affect. His speech is normal.   Nursing note and vitals reviewed.      Assessment/Plan:       Problems Addressed this Visit        Other    Pain in both lower legs - Primary     Pain and weakness in LE worsening in last year.    EMG/NCS of the bilateral lower extremities,    Labs     Stop Xanax and Lortab         Relevant Orders    CK    Myasthenia Gravis Full Panel    Sedimentation Rate    Nerve Conduction Test

## 2017-10-26 ENCOUNTER — TELEPHONE (OUTPATIENT)
Dept: CARDIOLOGY | Facility: CLINIC | Age: 82
End: 2017-10-26

## 2017-10-26 NOTE — TELEPHONE ENCOUNTER
With patient symptoms it is best for patient to proceed with NCS per Dr. Holder. Daughter request is it safe to proceed with dual PPM. I spoke with Nicole Marques and [facility]. Jaylon jane., he states with ppt AP and  > 99% a magnet will probably need to be used and if complications during study will need interrogation after otherwise just routine interrogations as planned. patient daughter verbalize understanding and is to call our office with questions or concerns. 1 sleeve of ranexa 1000 mg have been put back for patient as well.       ----- Message from Tiffani Dugan MA sent at 10/26/2017  5:09 PM EDT -----  Contact: ponce Holder referred patient to neuro. They are wanting to do a nerve conductions. Family is wanting advice before having it done with patient history.

## 2017-10-27 LAB
ACHR AB SER-SCNC: <0.03 NMOL/L (ref 0–0.24)
ACHR BLOCK AB/ACHR TOTAL SFR SER: 11 % (ref 0–25)
ACHR MOD AB/ACHR TOTAL SFR SER: <12 % (ref 0–20)
STRIA MUS AB TITR SER IF: NEGATIVE {TITER}

## 2017-10-31 DIAGNOSIS — R07.89 OTHER CHEST PAIN: ICD-10-CM

## 2017-10-31 RX ORDER — RANOLAZINE 1000 MG/1
1000 TABLET, EXTENDED RELEASE ORAL EVERY 12 HOURS
Qty: 60 TABLET | Refills: 0 | COMMUNITY
Start: 2017-10-31 | End: 2018-02-06 | Stop reason: SDUPTHER

## 2018-01-08 ENCOUNTER — HOSPITAL ENCOUNTER (OUTPATIENT)
Dept: NEUROLOGY | Facility: HOSPITAL | Age: 83
Discharge: HOME | End: 2018-01-08
Attending: PSYCHIATRY & NEUROLOGY | Admitting: PSYCHIATRY & NEUROLOGY

## 2018-01-08 ENCOUNTER — OFFICE VISIT (OUTPATIENT)
Dept: NEUROLOGY | Facility: CLINIC | Age: 83
End: 2018-01-08

## 2018-01-08 VITALS
BODY MASS INDEX: 20.92 KG/M2 | HEART RATE: 72 BPM | OXYGEN SATURATION: 97 % | SYSTOLIC BLOOD PRESSURE: 112 MMHG | RESPIRATION RATE: 16 BRPM | WEIGHT: 138 LBS | HEIGHT: 68 IN | DIASTOLIC BLOOD PRESSURE: 82 MMHG

## 2018-01-08 DIAGNOSIS — M79.661 PAIN IN BOTH LOWER LEGS: ICD-10-CM

## 2018-01-08 DIAGNOSIS — M79.662 PAIN IN BOTH LOWER LEGS: ICD-10-CM

## 2018-01-08 DIAGNOSIS — G62.9 POLYNEUROPATHY: Primary | ICD-10-CM

## 2018-01-08 PROCEDURE — 99213 OFFICE O/P EST LOW 20 MIN: CPT | Performed by: PSYCHIATRY & NEUROLOGY

## 2018-01-08 PROCEDURE — 95886 MUSC TEST DONE W/N TEST COMP: CPT

## 2018-01-08 PROCEDURE — 95910 NRV CNDJ TEST 7-8 STUDIES: CPT

## 2018-01-08 NOTE — PROGRESS NOTES
Subjective:   Chief Complaint   Patient presents with   • PAIN IN BOTH LOWER LEGS       Patient ID: Steven López is a 89 y.o. male.    History of Present Illness     89 y.o. male returns in follow up for muscle weakness.  Last visit on 10/24/17 d/c xanax and Lortab and ordered labs, EMG.    ESR, MG, CK - NCS    EMG/NCS :       Sensorimotor neuropathy, axonal, moderate     No evidence for radiculopathy or myopathy is seen in either leg    Xanax as needed.  Stopped Lortab.  Legs feels heavy and tired.  LH/dizzines when arising and needs to stop to rest.      Problem history:    LE are weak and have constant N/T in the last year.  Several episodes of legs not wanting to move after sitting.  Taking hydrocodone for arthritis pain.  Sx of LH worsened after taking Ranexa, Pravastatin started in April 2017, after stopping Lipitor.      Reviewed Dr Holder's notes:    CAD s/p CABG and redo, orthostatic LH, DDDR generator, angina once a week,    Labs - CMP, iron, cbc, Vit D, TSH, B12 - NCS       The following portions of the patient's history were reviewed and updated as appropriate:   He  has a past medical history of 3-vessel CAD; Angina pectoris; Arthritis; Dyslipidemia; Glaucoma; and Hypertension.  He  does not have any pertinent problems on file.  He  has a past surgical history that includes Coronary artery bypass graft; Prostate surgery; Tonsillectomy; Cardiac catheterization; Coronary stent placement; and Insert / replace / remove pacemaker.  His family history includes COPD in his brother; Other in his brother; Stroke in his father, mother, and other.  He  reports that he has quit smoking. He has never used smokeless tobacco. He reports that he does not drink alcohol or use illicit drugs.  Current Outpatient Prescriptions   Medication Sig Dispense Refill   • acetaminophen (ARTHRITIS PAIN RELIEVER) 650 MG 8 hr tablet Take  by mouth. prn     • ALPRAZolam (XANAX) 0.5 MG tablet Take  by mouth. 1/2 tab PRN     • aspirin  (ASPIRIN LOW DOSE) 81 MG EC tablet Take 81 mg by mouth Daily.     • Calcium-Magnesium-Vitamin D - MG-MG-UNIT tablet sustained-release 24 hour Take  by mouth. Doesn't take every day     • Coenzyme Q10 (CO Q-10) 100 MG capsule Take 100 mg by mouth 2 (Two) Times a Day.  0   • dorzolamide (TRUSOPT) 2 % ophthalmic solution Administer 1 drop to both eyes Daily.     • isosorbide mononitrate (IMDUR) 30 MG 24 hr tablet Take 1 tablet by mouth Daily. In the am 30 tablet 11   • latanoprost (XALATAN) 0.005 % ophthalmic solution Apply 125 mcg to eye daily.     • levothyroxine (SYNTHROID, LEVOTHROID) 50 MCG tablet Take 50 mcg by mouth Daily.     • nitroglycerin (NITROSTAT) 0.4 MG SL tablet Place  under the tongue.     • omeprazole (PriLOSEC) 20 MG capsule Take 20 mg by mouth Daily.     • Polyethylene Glycol 3350 (MIRALAX PO) Take  by mouth Daily As Needed.     • pravastatin (PRAVACHOL) 20 MG tablet Take 20 mg by mouth Daily.     • ranolazine (RANEXA) 1000 MG 12 hr tablet Take 1 tablet by mouth Every 12 (Twelve) Hours. 60 tablet 0     No current facility-administered medications for this visit.      Current Outpatient Prescriptions on File Prior to Visit   Medication Sig   • acetaminophen (ARTHRITIS PAIN RELIEVER) 650 MG 8 hr tablet Take  by mouth. prn   • ALPRAZolam (XANAX) 0.5 MG tablet Take  by mouth. 1/2 tab PRN   • aspirin (ASPIRIN LOW DOSE) 81 MG EC tablet Take 81 mg by mouth Daily.   • Calcium-Magnesium-Vitamin D - MG-MG-UNIT tablet sustained-release 24 hour Take  by mouth. Doesn't take every day   • Coenzyme Q10 (CO Q-10) 100 MG capsule Take 100 mg by mouth 2 (Two) Times a Day.   • dorzolamide (TRUSOPT) 2 % ophthalmic solution Administer 1 drop to both eyes Daily.   • isosorbide mononitrate (IMDUR) 30 MG 24 hr tablet Take 1 tablet by mouth Daily. In the am   • latanoprost (XALATAN) 0.005 % ophthalmic solution Apply 125 mcg to eye daily.   • levothyroxine (SYNTHROID, LEVOTHROID) 50 MCG tablet Take 50  mcg by mouth Daily.   • nitroglycerin (NITROSTAT) 0.4 MG SL tablet Place  under the tongue.   • omeprazole (PriLOSEC) 20 MG capsule Take 20 mg by mouth Daily.   • Polyethylene Glycol 3350 (MIRALAX PO) Take  by mouth Daily As Needed.   • pravastatin (PRAVACHOL) 20 MG tablet Take 20 mg by mouth Daily.   • ranolazine (RANEXA) 1000 MG 12 hr tablet Take 1 tablet by mouth Every 12 (Twelve) Hours.   • [DISCONTINUED] HYDROcodone-acetaminophen (NORCO) 5-325 MG per tablet Take 1 tablet by mouth Every 8 (Eight) Hours As Needed for Moderate Pain . prn     No current facility-administered medications on file prior to visit.      He is allergic to penicillins and sulfa antibiotics..    Review of Systems   Constitutional: Positive for fatigue. Negative for activity change and unexpected weight change.   HENT: Negative for facial swelling, hearing loss, tinnitus, trouble swallowing and voice change.    Eyes: Negative for photophobia, pain and visual disturbance.   Respiratory: Negative for apnea, cough and choking.    Cardiovascular: Negative for chest pain.   Gastrointestinal: Negative for constipation, nausea and vomiting.   Endocrine: Negative for cold intolerance.   Genitourinary: Negative for difficulty urinating, frequency and urgency.   Musculoskeletal: Positive for gait problem. Negative for arthralgias, back pain, myalgias, neck pain and neck stiffness.   Skin: Negative for rash.   Allergic/Immunologic: Negative for immunocompromised state.   Neurological: Positive for dizziness and weakness. Negative for tremors, seizures, syncope, facial asymmetry, speech difficulty, light-headedness, numbness and headaches.   Hematological: Negative for adenopathy.   Psychiatric/Behavioral: Negative for confusion, decreased concentration, hallucinations and sleep disturbance. The patient is not nervous/anxious.         Objective:  Vitals:    01/08/18 1352   BP: 112/82   BP Location: Right arm   Patient Position: Sitting   Cuff Size:  "Adult   Pulse: 72   Resp: 16   SpO2: 97%   Weight: 62.6 kg (138 lb)   Height: 172.7 cm (68\")       Neurologic Exam     Mental Status   Registration: recalls 3 of 3 objects. Recall at 5 minutes: recalls 3 of 3 objects. Follows 3 step commands.   Attention: normal. Concentration: normal.   Level of consciousness: alert  Knowledge: good and consistent with education.   Able to name object. Able to read. Able to repeat. Able to write. Normal comprehension.     Cranial Nerves     CN II   Visual fields full to confrontation.   Visual acuity: normal  Right visual field deficit: none  Left visual field deficit: none     CN III, IV, VI   Right pupil: Shape: regular. Reactivity: brisk. Consensual response: intact.   Left pupil: Shape: regular. Reactivity: brisk. Consensual response: intact.   Nystagmus: none   Diplopia: none  Ophthalmoparesis: none  Upgaze: normal  Downgaze: normal  Conjugate gaze: present  Vestibulo-ocular reflex: present    CN V   Facial sensation intact.   Right corneal reflex: normal  Left corneal reflex: normal    CN VII   Right facial weakness: none  Left facial weakness: none    CN VIII   Hearing: intact    CN IX, X   Palate: symmetric  Right gag reflex: normal  Left gag reflex: normal    CN XI   Right sternocleidomastoid strength: normal  Left sternocleidomastoid strength: normal    CN XII   Tongue: not atrophic  Fasciculations: absent  Tongue deviation: none    Motor Exam   Muscle bulk: normal  Overall muscle tone: normal  Right arm tone: normal  Left arm tone: normal  Right leg tone: normal  Left leg tone: normal    Strength   Right iliopsoas: 4/5  Left iliopsoas: 4/5  Right quadriceps: 4/5  Left quadriceps: 4/5  Right hamstrin/5  Left hamstrin/5  Right glutei: 4/5  Left glutei: 4/5  Right anterior tibial: 4/5  Left anterior tibial: 4/5  Right posterior tibial: 4/5  Left posterior tibial: 4/5  Right peroneal: 4/5  Left peroneal: 4/5  Right gastroc: 4/5  Left gastroc: 4/5    Sensory Exam "   Light touch normal.   Vibration normal.   Proprioception normal.   Pinprick normal.     Gait, Coordination, and Reflexes     Gait  Gait: shuffling    Tremor   Resting tremor: absent  Intention tremor: absent  Action tremor: absent    Reflexes   Reflexes 2+ except as noted.       Physical Exam   Constitutional: He appears well-developed and well-nourished.   Nursing note and vitals reviewed.      Assessment/Plan:       Problems Addressed this Visit        Nervous and Auditory    Polyneuropathy - Primary     Refer to PT for unsteady gait

## 2018-01-10 ENCOUNTER — TELEPHONE (OUTPATIENT)
Dept: NEUROLOGY | Facility: CLINIC | Age: 83
End: 2018-01-10

## 2018-01-10 NOTE — TELEPHONE ENCOUNTER
Advised Mer at [facility] that is was ok to send nurse out to review patient before starting PT

## 2018-01-10 NOTE — TELEPHONE ENCOUNTER
Mer from [facility] called and wanted to see if it would be ok to send nurse out first to see patient to make sure that due to his age that he had no additional needs before starting Pt with patient and if additional care was needed if that would be ok.

## 2018-01-24 ENCOUNTER — TELEPHONE (OUTPATIENT)
Dept: CARDIOLOGY | Facility: CLINIC | Age: 83
End: 2018-01-24

## 2018-01-24 NOTE — TELEPHONE ENCOUNTER
Spoke with Pauline, informed her we still dont have samples at this time and once we get samples we will let her know.     ----- Message from Jennifer Bishop LPN sent at 1/24/2018 10:56 AM EST -----  Contact: Pauline- patient's daughter [phone]   Requesting samples of Ranexa.

## 2018-02-06 DIAGNOSIS — R07.89 OTHER CHEST PAIN: ICD-10-CM

## 2018-02-06 RX ORDER — RANOLAZINE 1000 MG/1
TABLET, FILM COATED, EXTENDED RELEASE ORAL
Qty: 60 TABLET | Refills: 4 | Status: SHIPPED | OUTPATIENT
Start: 2018-02-06 | End: 2018-02-07 | Stop reason: SDUPTHER

## 2018-02-07 DIAGNOSIS — R07.89 OTHER CHEST PAIN: ICD-10-CM

## 2018-02-07 RX ORDER — RANOLAZINE 1000 MG/1
1000 TABLET, EXTENDED RELEASE ORAL EVERY 12 HOURS SCHEDULED
Qty: 60 TABLET | Refills: 4 | Status: SHIPPED | OUTPATIENT
Start: 2018-02-07 | End: 2018-03-13 | Stop reason: SINTOL

## 2018-02-20 ENCOUNTER — OFFICE VISIT (OUTPATIENT)
Dept: CARDIOLOGY | Facility: CLINIC | Age: 83
End: 2018-02-20

## 2018-02-20 VITALS
BODY MASS INDEX: 20.4 KG/M2 | HEART RATE: 79 BPM | DIASTOLIC BLOOD PRESSURE: 61 MMHG | SYSTOLIC BLOOD PRESSURE: 107 MMHG | HEIGHT: 68 IN | OXYGEN SATURATION: 98 % | WEIGHT: 134.6 LBS

## 2018-02-20 DIAGNOSIS — R06.02 SHORTNESS OF BREATH: Primary | ICD-10-CM

## 2018-02-20 DIAGNOSIS — R42 DIZZINESS: ICD-10-CM

## 2018-02-20 DIAGNOSIS — I25.10 CORONARY ARTERY DISEASE INVOLVING NATIVE CORONARY ARTERY OF NATIVE HEART WITHOUT ANGINA PECTORIS: ICD-10-CM

## 2018-02-20 PROCEDURE — 99214 OFFICE O/P EST MOD 30 MIN: CPT | Performed by: PHYSICIAN ASSISTANT

## 2018-02-20 RX ORDER — HYDROCODONE BITARTRATE AND ACETAMINOPHEN 5; 325 MG/1; MG/1
TABLET ORAL
COMMUNITY
Start: 2018-02-12 | End: 2018-03-13

## 2018-02-20 RX ORDER — ONDANSETRON 4 MG/1
TABLET, ORALLY DISINTEGRATING ORAL
COMMUNITY
Start: 2017-12-27 | End: 2018-03-13

## 2018-02-20 NOTE — PROGRESS NOTES
Problem list     Subjective   Steven López is a 89 y.o. male     Chief Complaint   Patient presents with   • Extremity Weakness     extremity shakiness. Here for ER f/u.       HPI    PROBLEM LIST:      1. Coronary artery disease with multiple pci's in the past.  1.1 CABG, March 2003 with LIMA to LAD, saphenous vein graft of first and second posterolateral marginal circumflex.  1.2 Repeat CABG, October 2007 with autologous saphenous vein to the LAD and diagonal vessels.  1.3 Follow up stress in August 2015 demonstrating posterolateral ischemia  1.3 History of multiple percutaneous interventions.  2. 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 Hypertension  4. Dyslipidemia  5. Dizziness   6. Fatigue     Patient is a 89-year-old male that presents back for follow-up.  Patient has been hospitalized in [facility] for 2 separate occasions with complaints of dizziness and the diagnosis of dehydration.  However, he he has been trying to stay hydrated.  Patient has had significant dizziness upon standing.  According to the family, life flight nurses have noted drop in systolic blood pressure significantly upon standing with patient doing symptomatic.  It was requested that he follow up sooner than his March formalin Dr. Holder.    Patient has continued to hydrate himself but still occasionally have dizziness.  He also complains of generalized muscle weakness and fatigue.  He did see a neurologist and was diagnosed with neuropathy and has been undergoing physical therapy.  Unfortunately, he suffered a muscle strain from physical therapy involving his hip.  He has not been as active because of that strain.    On a very rare occasion will he get any chest discomfort at all.  He will have a slight pressure and then resolves spontaneously.  This is been ongoing for several months without any progression or change.  Shortness of breath is only  minimal.  Patient ambulates with a cane in functional status is impaired because of other comorbidities.  No PND orthopnea.    He doesn't express any palpitations.  Denies any syncopal episodes and otherwise is doing well      Outpatient Encounter Prescriptions as of 2/20/2018   Medication Sig Dispense Refill   • acetaminophen (ARTHRITIS PAIN RELIEVER) 650 MG 8 hr tablet Take  by mouth. prn     • ALPRAZolam (XANAX) 0.5 MG tablet Take  by mouth. 1/2 tab PRN     • aspirin (ASPIRIN LOW DOSE) 81 MG EC tablet Take 81 mg by mouth Daily.     • Calcium-Magnesium-Vitamin D - MG-MG-UNIT tablet sustained-release 24 hour Take  by mouth. Doesn't take every day     • Coenzyme Q10 (CO Q-10) 100 MG capsule Take 100 mg by mouth 2 (Two) Times a Day.  0   • Dextromethorphan-Guaifenesin (CORICIDIN HBP CONGESTION/COUGH PO) Take  by mouth. prn     • dorzolamide (TRUSOPT) 2 % ophthalmic solution Administer 1 drop to both eyes Daily.     • Guaifenesin-Codeine (CHERATUSSIN AC PO) Take  by mouth. prn     • HYDROcodone-acetaminophen (NORCO) 5-325 MG per tablet prn     • isosorbide mononitrate (IMDUR) 30 MG 24 hr tablet Take 1 tablet by mouth Daily. In the am (Patient taking differently: Take 15 mg by mouth Daily. In the am) 30 tablet 11   • latanoprost (XALATAN) 0.005 % ophthalmic solution Apply 125 mcg to eye daily.     • levothyroxine (SYNTHROID, LEVOTHROID) 50 MCG tablet Take 50 mcg by mouth Daily.     • omeprazole (PriLOSEC) 20 MG capsule Take 20 mg by mouth Daily.     • ondansetron ODT (ZOFRAN-ODT) 4 MG disintegrating tablet prn     • Polyethylene Glycol 3350 (MIRALAX PO) Take  by mouth Daily As Needed.     • pravastatin (PRAVACHOL) 20 MG tablet Take 20 mg by mouth Daily.     • ranolazine (RANEXA) 1000 MG 12 hr tablet Take 1 tablet by mouth Every 12 (Twelve) Hours. 60 tablet 4   • nitroglycerin (NITROSTAT) 0.4 MG SL tablet Place  under the tongue.       No facility-administered encounter medications on file as of 2/20/2018.   "      Penicillins and Sulfa antibiotics    Past Medical History:   Diagnosis Date   • 3-vessel CAD    • Angina pectoris    • Arthritis    • Dyslipidemia    • Glaucoma    • Hypertension        Social History     Social History   • Marital status:      Spouse name: N/A   • Number of children: N/A   • Years of education: N/A     Occupational History   • Not on file.     Social History Main Topics   • Smoking status: Former Smoker   • Smokeless tobacco: Never Used   • Alcohol use No   • Drug use: No   • Sexual activity: Defer     Other Topics Concern   • Not on file     Social History Narrative       Family History   Problem Relation Age of Onset   • Stroke Mother    • Stroke Father    • Other Brother      CABG   • COPD Brother      Pulmonary Disease   • Stroke Other        Review of Systems   Constitutional: Positive for fatigue.   HENT: Positive for postnasal drip.    Eyes: Positive for visual disturbance (glasses prn).   Respiratory: Positive for shortness of breath.    Cardiovascular: Positive for chest pain (left chest). Negative for palpitations and leg swelling.   Gastrointestinal: Negative.    Endocrine: Negative.    Genitourinary: Negative.    Musculoskeletal: Positive for arthralgias, gait problem (ambulates with cane) and myalgias.   Skin: Negative.    Allergic/Immunologic: Negative.    Neurological: Positive for weakness (extremity).        Extremity shakiness   Hematological: Bruises/bleeds easily.   Psychiatric/Behavioral: Positive for sleep disturbance (up to BR).       Objective   Vitals:    02/20/18 1443   BP: 107/61   BP Location: Left arm   Patient Position: Sitting   Pulse: 79   SpO2: 98%   Weight: 61.1 kg (134 lb 9.6 oz)   Height: 172.7 cm (67.99\")      /61 (BP Location: Left arm, Patient Position: Sitting)  Pulse 79  Ht 172.7 cm (67.99\")  Wt 61.1 kg (134 lb 9.6 oz)  SpO2 98%  BMI 20.47 kg/m2    Lab Results (most recent)     None          Physical Exam   Constitutional: He is " oriented to person, place, and time. He appears well-developed and well-nourished. No distress.   HENT:   Head: Normocephalic and atraumatic.   Eyes: EOM are normal. Pupils are equal, round, and reactive to light.   Neck: No JVD present.   Cardiovascular: Normal rate, regular rhythm, normal heart sounds and intact distal pulses.  Exam reveals no gallop and no friction rub.    No murmur heard.  Pulmonary/Chest: Effort normal and breath sounds normal. No respiratory distress. He has no wheezes. He has no rales. He exhibits no tenderness.   Musculoskeletal: Normal range of motion. He exhibits no edema.   Neurological: He is alert and oriented to person, place, and time. No cranial nerve deficit.   Skin: Skin is warm and dry. No rash noted. No erythema. No pallor.   Psychiatric: He has a normal mood and affect. His behavior is normal.   Nursing note and vitals reviewed.      Procedure   Procedures       Assessment/Plan     Problems Addressed this Visit        Cardiovascular and Mediastinum    Coronary artery disease involving native coronary artery of native heart without angina pectoris       Respiratory    Shortness of breath - Primary       Other    Dizziness            Recommendation  1.  Patient has significant symptoms of orthostasis and has remained hydrated.  I would like to stop his nitrates.  They bring a blood pressure log showing average of blood pressures in the 90s and hopefully by stopping this medication we can see if that will alleviate some of his symptoms of orthostasis.  He may very well need a mineralocorticoid to help increase intravascular volume and improve symptoms.  They would like to stop medication and evaluate response.  He is to see Dr. Holder in a few weeks and will pressures will be monitored closely.  2.  Family raises concern about patient's significant lower extremity weakness.  At 89 years of age, some weakness is to be expected but they raise concern about statins.  Furthermore,  even with the institution of coenzyme Q10, he still experiences weakness.  I would like for them to hold that until we see Dr. Holder to see if that improves his symptoms of generalized weakness, myalgias and generalized pain.  Medically, with patient having coronary artery disease and history of 2 bypasses, he would need to be on statin therapy.  However, at patient's age and other comorbidities, there should be consideration of patient's quality of life, ease of mobility and overall well-being.  We will attempt to hold this medication to see if he has any improvement and he will discuss further with Dr. Holder upon follow-up.  3.  Otherwise we will have the device interrogated to ensure no arrhythmias as a cause of patient's dizziness before he follows back up.  For any change in symptoms, they will contact our office.  Chest pain is atypical and only happens on occasion and will continue to monitor.         Electronically signed by:

## 2018-02-26 ENCOUNTER — TELEPHONE (OUTPATIENT)
Dept: CARDIOLOGY | Facility: CLINIC | Age: 83
End: 2018-02-26

## 2018-02-26 NOTE — TELEPHONE ENCOUNTER
Patients daughter called and stated patient was having feet swelling. She wanted to know if patient D/Cing Imdur and Statin would cause this? Patient's daughter (Pauline) was notified that D/Cing these medications would not cause swelling. She also stated that he was started on Levaquin over the weekend, I did notify her however, this would cause swelling and to contact PCP and notify them this was causing swelling. -;[facility]

## 2018-03-13 ENCOUNTER — OFFICE VISIT (OUTPATIENT)
Dept: CARDIOLOGY | Facility: CLINIC | Age: 83
End: 2018-03-13

## 2018-03-13 VITALS
SYSTOLIC BLOOD PRESSURE: 108 MMHG | DIASTOLIC BLOOD PRESSURE: 61 MMHG | HEIGHT: 68 IN | BODY MASS INDEX: 20.46 KG/M2 | OXYGEN SATURATION: 92 % | WEIGHT: 135 LBS | HEART RATE: 69 BPM

## 2018-03-13 DIAGNOSIS — M79.661 PAIN IN BOTH LOWER LEGS: ICD-10-CM

## 2018-03-13 DIAGNOSIS — I20.9 ANGINA PECTORIS (HCC): ICD-10-CM

## 2018-03-13 DIAGNOSIS — I25.10 3-VESSEL CAD: Primary | ICD-10-CM

## 2018-03-13 DIAGNOSIS — M79.662 PAIN IN BOTH LOWER LEGS: ICD-10-CM

## 2018-03-13 DIAGNOSIS — R26.89 IMBALANCE: ICD-10-CM

## 2018-03-13 DIAGNOSIS — I95.1 ORTHOSTATIC HYPOTENSION: ICD-10-CM

## 2018-03-13 DIAGNOSIS — R53.82 CHRONIC FATIGUE: ICD-10-CM

## 2018-03-13 DIAGNOSIS — R42 DIZZINESS: ICD-10-CM

## 2018-03-13 DIAGNOSIS — I10 ESSENTIAL HYPERTENSION: ICD-10-CM

## 2018-03-13 DIAGNOSIS — R06.02 SHORTNESS OF BREATH: ICD-10-CM

## 2018-03-13 DIAGNOSIS — I25.10 CORONARY ARTERY DISEASE INVOLVING NATIVE CORONARY ARTERY OF NATIVE HEART WITHOUT ANGINA PECTORIS: ICD-10-CM

## 2018-03-13 DIAGNOSIS — R53.1 WEAKNESS: ICD-10-CM

## 2018-03-13 PROCEDURE — 99214 OFFICE O/P EST MOD 30 MIN: CPT | Performed by: INTERNAL MEDICINE

## 2018-03-13 RX ORDER — ESCITALOPRAM OXALATE 10 MG/1
10 TABLET ORAL DAILY
Qty: 30 TABLET | Refills: 2 | Status: SHIPPED | OUTPATIENT
Start: 2018-03-13 | End: 2018-03-13 | Stop reason: SDUPTHER

## 2018-03-13 RX ORDER — ESCITALOPRAM OXALATE 10 MG/1
10 TABLET ORAL DAILY
Qty: 30 TABLET | Refills: 2 | Status: SHIPPED | OUTPATIENT
Start: 2018-03-13 | End: 2018-06-05 | Stop reason: SDUPTHER

## 2018-03-13 NOTE — PROGRESS NOTES
"Subjective   Steven López is a 89 y.o. male     Chief Complaint   Patient presents with   • Follow-up     2-3 week  follow up    • Coronary Artery Disease     3 vessel CAD   • Dizziness     unchanged since last visit with Shay        PROBLEM LIST:     1. Coronary artery disease with multiple pci's in the past.  1.1 CABG, March 2003 with LIMA to LAD, saphenous vein graft of first and second posterolateral marginal circumflex.  1.2 Repeat CABG, October 2007 with autologous saphenous vein to the LAD and diagonal vessels.  1.3 Follow up stress in August 2015 demonstrating posterolateral ischemia  1.3 History of multiple percutaneous interventions.  2. 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 Hypertension  4. Dyslipidemia  5. Dizziness   6. Fatigue     Specialty Problems        Cardiology Problems    3-vessel CAD        Angina pectoris        Hypertension        Hypotension        Coronary artery disease involving native coronary artery of native heart without angina pectoris                HPI:  Mr. López returns for follow-up because of severe leg weakness, fatigue, tremor, dizziness, and imbalance.    He was seen recently in the office and had nitrates.  Because of hypotension and orthostatic symptomatology, and statin was also held because of leg weakness.  Neither of these maneuvers resulted in significant improvement.    Currently, Mr. López describes only rare chest pain which, by his description, is non-anginal in nature.  He relates orthopnea or PND.  He doesn't palpitate during episodes of weakness or dizziness.  He describes no vertigo.  By description I believe the patient suffers both from episodic dizziness as well as imbalance.  It is noted that he uses opioid analgesics on a daily basis (however at very low dose (and that he uses benzodiazepines intermittently for \"anxiety attacks\".  Mr. López also states that he believes some of " his weakness and dizziness began about the time that Ranexa was started.                  CURRENT MEDICATION:    Current Outpatient Prescriptions   Medication Sig Dispense Refill   • acetaminophen (ARTHRITIS PAIN RELIEVER) 650 MG 8 hr tablet Take  by mouth. prn     • ALPRAZolam (XANAX) 0.5 MG tablet Take  by mouth. 1/2 tab PRN     • aspirin (ASPIRIN LOW DOSE) 81 MG EC tablet Take 81 mg by mouth Daily.     • Calcium-Magnesium-Vitamin D - MG-MG-UNIT tablet sustained-release 24 hour Take  by mouth. Doesn't take every day     • Coenzyme Q10 (CO Q-10) 100 MG capsule Take 100 mg by mouth 2 (Two) Times a Day.  0   • dorzolamide (TRUSOPT) 2 % ophthalmic solution Administer 1 drop to both eyes Daily.     • HYDROcodone-acetaminophen (NORCO) 5-325 MG per tablet prn     • latanoprost (XALATAN) 0.005 % ophthalmic solution Apply 125 mcg to eye daily.     • levothyroxine (SYNTHROID, LEVOTHROID) 50 MCG tablet Take 50 mcg by mouth Daily.     • nitroglycerin (NITROSTAT) 0.4 MG SL tablet Place  under the tongue.     • omeprazole (PriLOSEC) 20 MG capsule Take 20 mg by mouth Daily.     • Polyethylene Glycol 3350 (MIRALAX PO) Take  by mouth Daily As Needed.     • ranolazine (RANEXA) 1000 MG 12 hr tablet Take 1 tablet by mouth Every 12 (Twelve) Hours. 60 tablet 4   • isosorbide mononitrate (IMDUR) 30 MG 24 hr tablet Take 1 tablet by mouth Daily. In the am (Patient taking differently: Take 15 mg by mouth Daily. In the am) 30 tablet 11   • pravastatin (PRAVACHOL) 20 MG tablet Take 20 mg by mouth Daily.       No current facility-administered medications for this visit.        ALLERGIES:    Penicillins and Sulfa antibiotics    PAST MEDICAL HISTORY:    Past Medical History:   Diagnosis Date   • 3-vessel CAD    • Angina pectoris    • Arthritis    • Dyslipidemia    • Glaucoma    • Hypertension        SURGICAL HISTORY:    Past Surgical History:   Procedure Laterality Date   • CARDIAC CATHETERIZATION     • CORONARY ARTERY BYPASS GRAFT       x2   • CORONARY STENT PLACEMENT     • INSERT / REPLACE / REMOVE PACEMAKER     • PROSTATE SURGERY     • TONSILLECTOMY         SOCIAL HISTORY:    Social History     Social History   • Marital status:      Spouse name: N/A   • Number of children: N/A   • Years of education: N/A     Occupational History   • Not on file.     Social History Main Topics   • Smoking status: Former Smoker   • Smokeless tobacco: Never Used   • Alcohol use No   • Drug use: No   • Sexual activity: Defer     Other Topics Concern   • Not on file     Social History Narrative   • No narrative on file       FAMILY HISTORY:    Family History   Problem Relation Age of Onset   • Stroke Mother    • Stroke Father    • Other Brother      CABG   • COPD Brother      Pulmonary Disease   • Stroke Other        Review of Systems   Constitutional: Positive for activity change (limited, ambulates with aid of cane ) and fatigue. Negative for chills, diaphoresis and fever.   HENT: Negative for congestion, sinus pain, sinus pressure, sneezing, sore throat, tinnitus, trouble swallowing and voice change.    Eyes: Negative for visual disturbance.   Respiratory: Positive for shortness of breath (mild COPD, with over exertion ). Negative for cough, choking, chest tightness, wheezing and stridor.    Cardiovascular: Positive for chest pain (rare, occurs on right side while laying down at night will occasionally take nitro, non angina related ) and leg swelling (mild ). Negative for palpitations.   Gastrointestinal: Negative for abdominal pain, blood in stool, constipation, diarrhea, nausea and vomiting.   Endocrine: Negative for cold intolerance and heat intolerance.   Genitourinary: Negative for decreased urine volume, difficulty urinating, frequency, hematuria and urgency.   Musculoskeletal: Positive for arthralgias and gait problem (ambulates with aid of cane). Negative for joint swelling, neck pain and neck stiffness.   Skin: Negative for color change,  "pallor, rash and wound.   Allergic/Immunologic: Negative.  Negative for environmental allergies, food allergies and immunocompromised state.   Neurological: Positive for dizziness (imbalance), weakness (leg weakness and shakiness, Shay stopped pravastatin 2 weeks ago and shakiness or weakness has not improved ) and light-headedness. Negative for tremors, seizures, syncope, facial asymmetry, speech difficulty, numbness and headaches.   Hematological: Negative.  Negative for adenopathy. Does not bruise/bleed easily.       VISIT VITALS:  Vitals:    03/13/18 1040   BP: 108/61   BP Location: Left arm   Patient Position: Sitting   Pulse: 69   SpO2: 92%   Weight: 61.2 kg (135 lb)   Height: 172.7 cm (68\")      /61 (BP Location: Left arm, Patient Position: Sitting)   Pulse 69   Ht 172.7 cm (68\")   Wt 61.2 kg (135 lb)   SpO2 92%   BMI 20.53 kg/m²     RECENT LABS:    Objective       Physical Exam    Procedures      Assessment/Plan    #1.  I'm very concerned about any use of opioids and benzodiazepines in this fragile 89-year-old patient.  Therefore, I had a long discussion with the patient is family about decreasing and ultimately discontinuing these medications.    #2.  Mr. López will try to go without hydrocodone and will substitute ibuprofen 2-400 mg twice daily with continued use of PPI inhibitors.  He was cautioned reference dyspepsia, bleeding, hypertension, and renal dysfunction.  I would like to check labs in 2-3 weeks.    #3.  Again, after a long discussion with the patient is family, I would like to trial using an SSRI to treat anxiety in order to discontinue the use of benzodiazepines.  We will start Celexa 10 mg daily and the patient's family will assist Mr. López and minimizing his use of Xanax.    #4.  Think that CNS effects of Ranexa may be contributing to some of the patient's symptoms.  This seems to be corroborated by both the patient and his sons report that symptoms worsened around the time " that Ranexa was started.  Therefore, in the absence of significant angina, we will hold that medication as well.    #5.  Mr. López was instructed to follow-up with Christine Land per her instructions, and in our office in 3-4 weeks or on a when necessary basis for worsening of symptoms, medication intolerance, etc. as discussed in detail today..                 Diagnosis Plan   1. 3-vessel CAD     2. Dizziness     3. Shortness of breath     4. Chronic fatigue         No Follow-up on file.           Discussed the patient's BMI with him. BMI is within normal parameters. No follow-up required.       Dolly Underwood MA   Scribed for Dr. Fish Holder by YAZMIN Hallman March 13, 2018 11:23 AM               Electronically signed by:            This note is dictated utilizing voice recognition software.  Although this record has been proof read, transcriptional errors may still be present. If questions occur regarding the content of this record please do not hesitate to call our office.

## 2018-03-16 ENCOUNTER — OFFICE VISIT (OUTPATIENT)
Dept: CARDIOLOGY | Facility: CLINIC | Age: 83
End: 2018-03-16

## 2018-03-16 DIAGNOSIS — I45.9 CONDUCTION DISORDER OF THE HEART: Primary | ICD-10-CM

## 2018-03-16 PROCEDURE — 93288 INTERROG EVL PM/LDLS PM IP: CPT | Performed by: INTERNAL MEDICINE

## 2018-04-16 ENCOUNTER — OFFICE VISIT (OUTPATIENT)
Dept: CARDIOLOGY | Facility: CLINIC | Age: 83
End: 2018-04-16

## 2018-04-16 VITALS
HEIGHT: 69 IN | WEIGHT: 140.2 LBS | HEART RATE: 82 BPM | OXYGEN SATURATION: 98 % | SYSTOLIC BLOOD PRESSURE: 108 MMHG | DIASTOLIC BLOOD PRESSURE: 54 MMHG | BODY MASS INDEX: 20.76 KG/M2

## 2018-04-16 DIAGNOSIS — R42 DIZZINESS: ICD-10-CM

## 2018-04-16 DIAGNOSIS — R06.02 SHORTNESS OF BREATH: ICD-10-CM

## 2018-04-16 DIAGNOSIS — M79.661 PAIN IN BOTH LOWER LEGS: ICD-10-CM

## 2018-04-16 DIAGNOSIS — I25.119 CORONARY ARTERY DISEASE INVOLVING NATIVE CORONARY ARTERY OF NATIVE HEART WITH ANGINA PECTORIS (HCC): Primary | ICD-10-CM

## 2018-04-16 DIAGNOSIS — I20.9 ANGINA PECTORIS (HCC): ICD-10-CM

## 2018-04-16 DIAGNOSIS — E78.5 DYSLIPIDEMIA: ICD-10-CM

## 2018-04-16 DIAGNOSIS — I10 ESSENTIAL HYPERTENSION: ICD-10-CM

## 2018-04-16 DIAGNOSIS — R06.89 DECREASED BREATH SOUNDS: ICD-10-CM

## 2018-04-16 DIAGNOSIS — R60.0 LOCALIZED EDEMA: ICD-10-CM

## 2018-04-16 DIAGNOSIS — I25.10 3-VESSEL CAD: ICD-10-CM

## 2018-04-16 DIAGNOSIS — I95.1 ORTHOSTATIC HYPOTENSION: ICD-10-CM

## 2018-04-16 DIAGNOSIS — R53.82 CHRONIC FATIGUE: ICD-10-CM

## 2018-04-16 DIAGNOSIS — M79.662 PAIN IN BOTH LOWER LEGS: ICD-10-CM

## 2018-04-16 PROCEDURE — 99213 OFFICE O/P EST LOW 20 MIN: CPT | Performed by: INTERNAL MEDICINE

## 2018-04-16 RX ORDER — FINASTERIDE 5 MG/1
5 TABLET, FILM COATED ORAL DAILY
COMMUNITY
Start: 2018-03-29 | End: 2021-03-04

## 2018-04-16 RX ORDER — OMEGA-3 FATTY ACIDS/FISH OIL 300-1000MG
CAPSULE ORAL 2 TIMES DAILY
COMMUNITY
End: 2019-05-15

## 2018-04-16 RX ORDER — CEFUROXIME AXETIL 250 MG/1
TABLET ORAL 2 TIMES DAILY
COMMUNITY
Start: 2018-04-13 | End: 2018-10-19

## 2018-04-16 NOTE — PROGRESS NOTES
Subjective   Steven López is a 89 y.o. male     Chief Complaint   Patient presents with   • Follow-up     1 month f/u, med changes f/u    • Coronary Artery Disease     3 vessel    • Dizziness     improved with stopping ranexa, seen urologist  and was started on finestride and dizziness has started back    • Extremity Weakness   • Shortness of Breath     with over exertion        PROBLEM LIST:     1. Coronary artery disease with multiple pci's in the past.  1.1 CABG, March 2003 with LIMA to LAD, saphenous vein graft of first and second posterolateral marginal circumflex.  1.2 Repeat CABG, October 2007 with autologous saphenous vein to the LAD and diagonal vessels.  1.3 Follow up stress in August 2015 demonstrating posterolateral ischemia  1.3 History of multiple percutaneous interventions.  2. 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 Hypertension  4. Dyslipidemia  5. Dizziness   6. Fatigue     Specialty Problems        Cardiology Problems    3-vessel CAD        Angina pectoris        Hypertension        Hypotension        Coronary artery disease involving native coronary artery of native heart without angina pectoris                HPI:  Mr. López returns for follow-up today to assess response to therapy changes.    When last seen here he had complaints of weakness, dizziness, imbalance, and tremor.  All of these improved with stopping benzodiazepines and opioids.  We substituted low-dose SSRI and Mr. López seemed to have improved strength and stamina.  In fact his son stated that even helped him mowing the lawn.     Shortly thereafter Mr. López was started on finasteride and antibiotic in his leg weakness and tremor and in balance as well as dizziness returned.  He describes chest pain atypical for angina and is taking only a single nitroglycerin since last being seen here he relates orthopnea or PND.  He has had worsening of lower  extremity edema with medication changes.  This seemed to be most severe with therapy directed at his prostate.  However, I suspect that non-steroidal anti-inflammatory use may be contributing factor.            CURRENT MEDICATION:    Current Outpatient Prescriptions   Medication Sig Dispense Refill   • aspirin (ASPIRIN LOW DOSE) 81 MG EC tablet Take 81 mg by mouth Daily.     • Calcium-Magnesium-Vitamin D - MG-MG-UNIT tablet sustained-release 24 hour Take  by mouth. Doesn't take every day     • cefuroxime (CEFTIN) 250 MG tablet 2 (Two) Times a Day.     • Coenzyme Q10 (CO Q-10) 100 MG capsule Take 100 mg by mouth 2 (Two) Times a Day.  0   • dorzolamide (TRUSOPT) 2 % ophthalmic solution Administer 1 drop to both eyes Daily.     • escitalopram (LEXAPRO) 10 MG tablet Take 1 tablet by mouth Daily. 30 tablet 2   • finasteride (PROSCAR) 5 MG tablet Daily.     • Ibuprofen 200 MG capsule Take  by mouth 2 (Two) Times a Day.     • latanoprost (XALATAN) 0.005 % ophthalmic solution Apply 125 mcg to eye daily.     • levothyroxine (SYNTHROID, LEVOTHROID) 50 MCG tablet Take 50 mcg by mouth Daily.     • nitroglycerin (NITROSTAT) 0.4 MG SL tablet Place  under the tongue.     • omeprazole (PriLOSEC) 20 MG capsule Take 20 mg by mouth Daily.     • Polyethylene Glycol 3350 (MIRALAX PO) Take  by mouth Daily As Needed.       No current facility-administered medications for this visit.        ALLERGIES:    Penicillins and Sulfa antibiotics    PAST MEDICAL HISTORY:    Past Medical History:   Diagnosis Date   • 3-vessel CAD    • Angina pectoris    • Arthritis    • Dyslipidemia    • Enlarged prostate    • Glaucoma    • Hypertension        SURGICAL HISTORY:    Past Surgical History:   Procedure Laterality Date   • CARDIAC CATHETERIZATION     • CORONARY ARTERY BYPASS GRAFT      x2   • CORONARY STENT PLACEMENT     • INSERT / REPLACE / REMOVE PACEMAKER     • PROSTATE SURGERY     • TONSILLECTOMY         SOCIAL HISTORY:    Social History      Social History   • Marital status:      Spouse name: N/A   • Number of children: N/A   • Years of education: N/A     Occupational History   • Not on file.     Social History Main Topics   • Smoking status: Former Smoker   • Smokeless tobacco: Never Used   • Alcohol use No   • Drug use: No   • Sexual activity: Defer     Other Topics Concern   • Not on file     Social History Narrative   • No narrative on file       FAMILY HISTORY:    Family History   Problem Relation Age of Onset   • Stroke Mother    • Stroke Father    • Other Brother      CABG   • COPD Brother      Pulmonary Disease   • Stroke Other        Review of Systems   Constitutional: Positive for fatigue. Negative for chills, diaphoresis and fever.   HENT: Negative for congestion, dental problem, drooling, ear discharge, ear pain, facial swelling, hearing loss, mouth sores, nosebleeds, postnasal drip, rhinorrhea, sinus pain, sinus pressure, sneezing, sore throat, tinnitus, trouble swallowing and voice change.    Eyes: Negative for visual disturbance.   Respiratory: Positive for shortness of breath. Negative for apnea, cough, choking, chest tightness, wheezing and stridor.    Cardiovascular: Positive for chest pain (on left and right side, intermittent, unchanged since last visit ) and leg swelling (BL ).   Gastrointestinal: Negative for abdominal pain, blood in stool (no melena, no hematuria, no hematemesis, no hematochezia), constipation, diarrhea, nausea and vomiting.   Endocrine: Negative for cold intolerance and heat intolerance.   Genitourinary: Positive for difficulty urinating, dysuria, frequency, penile pain and urgency.   Musculoskeletal: Positive for gait problem (ambulates with aid of cane ).   Skin: Negative for color change, pallor, rash and wound.   Allergic/Immunologic: Negative for environmental allergies and food allergies.   Neurological: Positive for dizziness, weakness (improved with stopping ranexa, seen Dr. Evans asn was  "started on proscar and antibiotic, dizziness seems to be coming back ) and light-headedness. Negative for tremors, seizures, syncope, facial asymmetry, speech difficulty, numbness and headaches.   Hematological: Negative.  Negative for adenopathy. Does not bruise/bleed easily.   Psychiatric/Behavioral: The patient is nervous/anxious (tolerating lexapro well).        VISIT VITALS:  Vitals:    04/16/18 1013   BP: 108/54   BP Location: Left arm   Patient Position: Sitting   Pulse: 82   SpO2: 98%   Weight: 63.6 kg (140 lb 3.2 oz)   Height: 175.3 cm (69\")      /54 (BP Location: Left arm, Patient Position: Sitting)   Pulse 82   Ht 175.3 cm (69\")   Wt 63.6 kg (140 lb 3.2 oz)   SpO2 98%   BMI 20.70 kg/m²     RECENT LABS:    Objective       Physical Exam   Constitutional: He is oriented to person, place, and time. He appears well-developed and well-nourished. No distress.   HENT:   Head: Normocephalic and atraumatic.   Eyes: Conjunctivae and EOM are normal. Pupils are equal, round, and reactive to light.   Neck: Normal range of motion. Neck supple. Normal carotid pulses, no hepatojugular reflux and no JVD present. Carotid bruit is not present. No tracheal deviation present. No thyroid mass and no thyromegaly present.   Cardiovascular: Normal rate, regular rhythm, S1 normal, S2 normal, normal heart sounds and intact distal pulses.  Exam reveals no gallop, no S3, no S4, no distant heart sounds and no friction rub.    No murmur heard.   No systolic murmur is present    No diastolic murmur is present   Pulmonary/Chest: Effort normal. He has decreased breath sounds (moderately redused breath sounds ). He has no wheezes. He has no rhonchi. He has no rales.   Abdominal: Soft. Bowel sounds are normal. He exhibits no distension, no abdominal bruit and no mass. There is no tenderness. There is no rebound and no guarding.   Negative organomegaly      Musculoskeletal: Normal range of motion. He exhibits edema (2+ edema to " lower calf RLE, 1+ edema LLE). He exhibits no tenderness or deformity.   Lymphadenopathy:     He has no cervical adenopathy.     He has no axillary adenopathy.   Neurological: He is alert and oriented to person, place, and time.   Skin: Skin is warm and dry. No rash noted. No erythema. No pallor.   Psychiatric: He has a normal mood and affect. His speech is normal and behavior is normal. Judgment and thought content normal. Cognition and memory are normal.   Nursing note and vitals reviewed.      Procedures      Assessment/Plan    1.  I think that, overall, Mr. López was significantly improved with the changes we made at the time of his last visit.    #2.  Therefore, I have asked the family to discuss with Dr. Evans the possibility of other forms of treatment for the patient's bladder outlet obstructive symptoms.    #3.  Although Motrin may be contributing to the patient's edema, I think he is better off on current medications and on chronic opioids.  Therefore, after discussion with the family, we will continue current therapy, substitute Tylenol for Motrin when able, and follow symptoms and labs.    #4.  The patient's family had questions about his last pacemaker interrogation.  They felt that the the interrogation was not done adequately.  I reviewed findings and I don't see any problems.  Therefore, we'll plan on just routine follow-up in that regard.    #5.  Mr. López's it increased risk for any type of surgical procedure based on his age, frailty, known coronary artery disease etc.  However, I think his overall risk may be better with mechanical relief of bladder outlet obstruction rather than long-term pharmacotherapy.  I would be happy to discuss risk benefit ratio is with Dr. Evans for that to be consideration.    #6.  We asked Mr. López to follow-up with [facility] per her instructions, and in our office in 4-6 months or on a when necessary basis for symptoms.           Diagnosis Plan   1. Coronary  artery disease involving native coronary artery of native heart with angina pectoris     2. Shortness of breath     3. Pain in both lower legs     4. Chronic fatigue     5. Dizziness     6. Localized edema     7. Decreased breath sounds         No Follow-up on file.              Patient's Body mass index is 20.7 kg/m². BMI is within normal parameters. No follow-up required.       Dolly Underwood MA  Scribed for Dr. Fish Holder by YAZMIN Hallman April 16, 2018 11:19 AM             Electronically signed by:            This note is dictated utilizing voice recognition software.  Although this record has been proof read, transcriptional errors may still be present. If questions occur regarding the content of this record please do not hesitate to call our office.

## 2018-06-05 RX ORDER — ESCITALOPRAM OXALATE 10 MG/1
TABLET ORAL
Qty: 30 TABLET | Refills: 2 | Status: SHIPPED | OUTPATIENT
Start: 2018-06-05 | End: 2018-08-27 | Stop reason: SDUPTHER

## 2018-06-13 ENCOUNTER — OFFICE VISIT (OUTPATIENT)
Dept: CARDIOLOGY | Facility: CLINIC | Age: 83
End: 2018-06-13

## 2018-06-13 VITALS
OXYGEN SATURATION: 98 % | DIASTOLIC BLOOD PRESSURE: 71 MMHG | HEART RATE: 72 BPM | HEIGHT: 69 IN | SYSTOLIC BLOOD PRESSURE: 155 MMHG | WEIGHT: 148 LBS | BODY MASS INDEX: 21.92 KG/M2

## 2018-06-13 DIAGNOSIS — R06.02 SHORTNESS OF BREATH: ICD-10-CM

## 2018-06-13 DIAGNOSIS — I25.119 CORONARY ARTERY DISEASE INVOLVING NATIVE CORONARY ARTERY OF NATIVE HEART WITH ANGINA PECTORIS (HCC): ICD-10-CM

## 2018-06-13 DIAGNOSIS — R09.89 BRUIT OF RIGHT CAROTID ARTERY: ICD-10-CM

## 2018-06-13 DIAGNOSIS — I10 ESSENTIAL HYPERTENSION: ICD-10-CM

## 2018-06-13 DIAGNOSIS — R09.89 RIGHT CAROTID BRUIT: ICD-10-CM

## 2018-06-13 DIAGNOSIS — R07.2 PRECORDIAL PAIN: ICD-10-CM

## 2018-06-13 DIAGNOSIS — R07.2 PRECORDIAL PAIN: Primary | ICD-10-CM

## 2018-06-13 PROCEDURE — 99214 OFFICE O/P EST MOD 30 MIN: CPT | Performed by: PHYSICIAN ASSISTANT

## 2018-06-13 PROCEDURE — 93000 ELECTROCARDIOGRAM COMPLETE: CPT | Performed by: PHYSICIAN ASSISTANT

## 2018-06-13 RX ORDER — HYDROCODONE BITARTRATE AND ACETAMINOPHEN 5; 325 MG/1; MG/1
1 TABLET ORAL EVERY 8 HOURS PRN
COMMUNITY

## 2018-06-13 NOTE — PROGRESS NOTES
Problem list     Subjective   Steven López is a 89 y.o. male     Chief Complaint   Patient presents with   • Chest Pain     PATIENT APPEARS IN OFFICE TODAY STATING HE IS HAVING CHEST PAIN ON/OFF   • Shortness of Breath   • Leg Swelling   PROBLEM LIST:      1. Coronary artery disease with multiple pci's in the past.  1.1 CABG, March 2003 with LIMA to LAD, saphenous vein graft of first and second posterolateral marginal circumflex.  1.2 Repeat CABG, October 2007 with autologous saphenous vein to the LAD and diagonal vessels.  1.3 Follow up stress in August 2015 demonstrating posterolateral ischemia  1.3 History of multiple percutaneous interventions.  2. 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 Hypertension  4. Dyslipidemia  5. Dizziness       6. Fatigue     HPI  The patient presents in today at his request.  He presents in the setting of chest discomfort.  The patient tells me that he has chronic angina.  His chest discomfort has worsened over the past several days.  He describes a precordial aching discomfort.  He has no referral of that.  Symptoms are worse with exertion and tender relieve off when resting.  He has had to take nitroglycerin which is beneficial for the pain as well.  He has associated dyspnea but no diaphoresis or nausea.  He has found nothing else which aggravates or alleviates his chest discomfort.  His baseline dyspnea has progressed as well.  He denies any associated PND or orthopnea.  He does have slight increase in edema however.  He does utilize physical maneuvers which are fairly successful in treatment of the edema.  The patient relates to only rare palpitations and certainly has no sustained dysrhythmic activity.  We did review his last pacemaker interrogation which indicated stable function.  He has no further complaints otherwise.    Current Outpatient Prescriptions   Medication Sig Dispense Refill   • aspirin  (ASPIRIN LOW DOSE) 81 MG EC tablet Take 81 mg by mouth Daily.     • Calcium-Magnesium-Vitamin D - MG-MG-UNIT tablet sustained-release 24 hour Take  by mouth. Doesn't take every day     • Coenzyme Q10 (CO Q-10) 100 MG capsule Take 100 mg by mouth 2 (Two) Times a Day.  0   • dorzolamide (TRUSOPT) 2 % ophthalmic solution Administer 1 drop to both eyes Daily.     • escitalopram (LEXAPRO) 10 MG tablet TAKE ONE TABLET BY MOUTH ONE TIME DAILY  30 tablet 2   • finasteride (PROSCAR) 5 MG tablet Take 5 mg by mouth Daily.     • HYDROcodone-acetaminophen (NORCO) 5-325 MG per tablet Take 1 tablet by mouth Every 8 (Eight) Hours As Needed for Mild Pain .     • Ibuprofen 200 MG capsule Take  by mouth 2 (Two) Times a Day.     • latanoprost (XALATAN) 0.005 % ophthalmic solution Apply 125 mcg to eye daily.     • levothyroxine (SYNTHROID, LEVOTHROID) 50 MCG tablet Take 50 mcg by mouth Daily.     • nitroglycerin (NITROSTAT) 0.4 MG SL tablet Place  under the tongue.     • omeprazole (PriLOSEC) 20 MG capsule Take 20 mg by mouth Daily.     • Polyethylene Glycol 3350 (MIRALAX PO) Take  by mouth Daily As Needed.     • cefuroxime (CEFTIN) 250 MG tablet 2 (Two) Times a Day.       No current facility-administered medications for this visit.        Penicillins and Sulfa antibiotics    Past Medical History:   Diagnosis Date   • 3-vessel CAD    • Angina pectoris    • Arthritis    • Dyslipidemia    • Enlarged prostate    • Glaucoma    • Hypertension        Social History     Social History   • Marital status:      Spouse name: N/A   • Number of children: N/A   • Years of education: N/A     Occupational History   • Not on file.     Social History Main Topics   • Smoking status: Former Smoker   • Smokeless tobacco: Never Used   • Alcohol use No   • Drug use: No   • Sexual activity: Defer     Other Topics Concern   • Not on file     Social History Narrative   • No narrative on file       Family History   Problem Relation Age of  "Onset   • Stroke Mother    • Stroke Father    • Other Brother         CABG   • COPD Brother         Pulmonary Disease   • Stroke Other        Review of Systems   Constitutional: Positive for fatigue (EASILY FATIGUED ).   HENT: Negative.  Negative for congestion, rhinorrhea, sneezing and sore throat.    Eyes: Positive for visual disturbance (WEARS GLASSES PRN).   Respiratory: Positive for shortness of breath (EASILY SOA ; WORSE ON EXERTION ). Negative for apnea, cough, chest tightness and wheezing.    Cardiovascular: Positive for chest pain (PRECORDIAL PAIN ) and leg swelling (BLE SWELLING/EDEMA). Negative for palpitations (DENIES PALPITATIONS).   Gastrointestinal: Positive for nausea (NAUSEADED). Negative for abdominal distention, abdominal pain and vomiting.   Endocrine: Negative.  Negative for cold intolerance, heat intolerance, polyphagia and polyuria.   Genitourinary: Positive for frequency (DUE TO ENLARGED PROSTATE). Negative for difficulty urinating and urgency.   Musculoskeletal: Positive for arthralgias (JOINTS). Negative for back pain, myalgias, neck pain and neck stiffness.   Skin: Negative.  Negative for rash and wound.   Allergic/Immunologic: Negative.  Negative for environmental allergies and food allergies.   Neurological: Negative.  Negative for dizziness, syncope, weakness, light-headedness and headaches.   Hematological: Bruises/bleeds easily (BRUISES AND BLEEDS EASILY).   Psychiatric/Behavioral: Negative.  Negative for agitation, confusion and sleep disturbance (DENIES WAKING UP SMOTHERING/SOA). The patient is not nervous/anxious.        Objective   Vitals:    06/13/18 1425   BP: 155/71   BP Location: Left arm   Patient Position: Sitting   Pulse: 72   SpO2: 98%   Weight: 67.1 kg (148 lb)   Height: 175.3 cm (69\")      /71 (BP Location: Left arm, Patient Position: Sitting)   Pulse 72   Ht 175.3 cm (69\")   Wt 67.1 kg (148 lb)   SpO2 98%   BMI 21.86 kg/m²    Lab Results (most recent)     " None        Physical Exam   Constitutional: He is oriented to person, place, and time. He appears well-developed and well-nourished. No distress.   HENT:   Head: Normocephalic and atraumatic.   Eyes: Conjunctivae and EOM are normal. Pupils are equal, round, and reactive to light.   Neck: Normal range of motion. Neck supple. Normal carotid pulses, no hepatojugular reflux and no JVD present. Carotid bruit is present (Right, soft.). No tracheal deviation present. No thyroid mass and no thyromegaly present.   Cardiovascular: Normal rate, regular rhythm, S1 normal, S2 normal, normal heart sounds and intact distal pulses.  Exam reveals no gallop, no S3, no S4, no distant heart sounds and no friction rub.    No murmur heard.   No systolic murmur is present    No diastolic murmur is present   Pulmonary/Chest: Effort normal. He has decreased breath sounds (moderately redused breath sounds ). He has no wheezes. He has no rhonchi. He has no rales.   Abdominal: Soft. Bowel sounds are normal. He exhibits no distension, no abdominal bruit and no mass. There is no tenderness. There is no rebound and no guarding.   Negative organomegaly      Musculoskeletal: Normal range of motion. He exhibits edema (2+ edema to lower calf RLE, 1+ edema LLE). He exhibits no tenderness or deformity.   Lymphadenopathy:     He has no cervical adenopathy.     He has no axillary adenopathy.   Neurological: He is alert and oriented to person, place, and time.   Skin: Skin is warm and dry. No rash noted. No erythema. No pallor.   Psychiatric: He has a normal mood and affect. His speech is normal and behavior is normal. Judgment and thought content normal. Cognition and memory are normal.   Nursing note and vitals reviewed.        Procedure     ECG 12 Lead  Date/Time: 6/13/2018 2:32 PM  Performed by: SALVADOR BERUMEN  Authorized by: SALVADOR BERUMEN   Comments: CP    Atrial and ventricular pacing without acute changes noted.               Assessment/Plan       Diagnosis Plan   1. Precordial pain  ECG 12 Lead   2. Coronary artery disease involving native coronary artery of native heart with angina pectoris     3. Shortness of breath     4. Essential hypertension     5. Right carotid bruit       I will schedule the patient for Lexiscan nuclear stress test.  He cannot tolerate treadmill protocol will be scheduled for Lexiscan.  I'll schedule for an echo as well given symptoms.  He does have a very soft right carotid bruit I will schedule for carotid duplex.  I'm going to continue medications for now with no changes indicated.  We can adjust accordingly once we review results of the above studies.  For any complications, the patient will call right away.  We will see him back pending test results.  Of note, the patient is not on statin therapy.  He was discontinued from that previously because of myalgias.  He in particular had severe leg pain.  Those symptoms never really improved.  I have asked him to restart his previous statin.  I will recheck a lipid and liver panel in 4-6 weeks.           Patient's Body mass index is 21.86 kg/m². BMI is within normal parameters. No follow-up required.             Electronically signed by:

## 2018-06-18 ENCOUNTER — HOSPITAL ENCOUNTER (OUTPATIENT)
Dept: CARDIOLOGY | Facility: HOSPITAL | Age: 83
Discharge: HOME | End: 2018-06-18

## 2018-06-18 ENCOUNTER — OUTSIDE FACILITY SERVICE (OUTPATIENT)
Dept: CARDIOLOGY | Facility: CLINIC | Age: 83
End: 2018-06-18

## 2018-06-18 LAB
MAXIMAL PREDICTED HEART RATE: 128 BPM
MAXIMAL PREDICTED HEART RATE: 128 BPM
STRESS TARGET HR: 109 BPM
STRESS TARGET HR: 109 BPM

## 2018-06-18 PROCEDURE — 93880 EXTRACRANIAL BILAT STUDY: CPT

## 2018-06-18 PROCEDURE — 93018 CV STRESS TEST I&R ONLY: CPT | Performed by: INTERNAL MEDICINE

## 2018-06-18 PROCEDURE — 93306 TTE W/DOPPLER COMPLETE: CPT

## 2018-06-18 PROCEDURE — 78452 HT MUSCLE IMAGE SPECT MULT: CPT

## 2018-06-18 PROCEDURE — A9500 TC99M SESTAMIBI: HCPCS | Performed by: INTERNAL MEDICINE

## 2018-06-18 PROCEDURE — 0 TECHNETIUM SESTAMIBI: Performed by: INTERNAL MEDICINE

## 2018-06-18 PROCEDURE — 78452 HT MUSCLE IMAGE SPECT MULT: CPT | Performed by: INTERNAL MEDICINE

## 2018-06-18 PROCEDURE — 93306 TTE W/DOPPLER COMPLETE: CPT | Performed by: INTERNAL MEDICINE

## 2018-06-18 PROCEDURE — 93880 EXTRACRANIAL BILAT STUDY: CPT | Performed by: INTERNAL MEDICINE

## 2018-06-18 PROCEDURE — 93017 CV STRESS TEST TRACING ONLY: CPT

## 2018-06-18 PROCEDURE — 25010000002 REGADENOSON 0.4 MG/5ML SOLUTION: Performed by: INTERNAL MEDICINE

## 2018-06-18 RX ADMIN — TECHNETIUM TC 99M SESTAMIBI 1 DOSE: 1 INJECTION INTRAVENOUS at 14:15

## 2018-06-18 RX ADMIN — REGADENOSON 0.4 MG: 0.08 INJECTION, SOLUTION INTRAVENOUS at 14:15

## 2018-06-19 ENCOUNTER — TELEPHONE (OUTPATIENT)
Dept: CARDIOLOGY | Facility: CLINIC | Age: 83
End: 2018-06-19

## 2018-06-19 RX ORDER — PRAVASTATIN SODIUM 20 MG
20 TABLET ORAL DAILY
Qty: 90 TABLET | Refills: 3 | Status: SHIPPED | OUTPATIENT
Start: 2018-06-19 | End: 2018-07-17 | Stop reason: SDUPTHER

## 2018-06-19 NOTE — TELEPHONE ENCOUNTER
Patients daughter , Pauline , called at 1303 PM. She stated last OV Steven Escobedo PA-C discussed with him about going back on Pravastatin. She called to let us know he had been on Pravastatin 20 mg in the past and would like that sent into pharmacy.       Per verbal order Steven Escobedo PA-C : send in Pravastatin 20 mg daily. -;[facility]

## 2018-06-19 NOTE — TELEPHONE ENCOUNTER
----- Message from Tiffani Butterfield MA sent at 6/18/2018  9:48 AM EDT -----  Contact: MAGDALENA [phone]      ----- Message -----  From: Valerie Wasserman  Sent: 6/18/2018   9:39 AM  To: [facility]    PLEASE CALL DAUGHTER, MAGDALENA, SHE HAS SOME QUESTIONS ABOUT HIS MEDICATION. YOU CAN CALL HER [phone]

## 2018-06-20 ENCOUNTER — DOCUMENTATION (OUTPATIENT)
Dept: CARDIOLOGY | Facility: CLINIC | Age: 83
End: 2018-06-20

## 2018-06-20 NOTE — PROGRESS NOTES
I called and spoke with patients daughter , ponce. She stated [facility] nurse , Alexey came out this AM and wanted to know if labs could be drawn on Tuesday upon next visit with him, notified her this was okay to draw labs then. Painters daughter also stated he had x1 episode of CP this morning, took Nitro and this pain resolved, however he had been outside in heat, at the barn when this occurred. I went over the effects of heat with him again and warned him to drink/increase fluids and to be careful with extreme heat and going to barn in this but to his age and health condition he needed to be cautious. I reviewed carotid u/s results with daughter. Notified her we have not yet received those testing results. I offered patient to come in and been seen by HAKAN Mast if he thought it was necessary , he stated he did not think it was necessary and would like for test results to come back and keep follow up as scheduled. I also this discussed this with patients daughter and she was okay with him waiting to be seen. I notified her if Cp worsened or increased and he had taken more than 3 Nitro and it had not resolved to take him to local ER. Alexey from [facility] was also notified of all of the decisions and to call if she had questions or s/s to report. All was verbalized and understood. -JACIEL;TYLER

## 2018-06-26 ENCOUNTER — DOCUMENTATION (OUTPATIENT)
Dept: CARDIOLOGY | Facility: CLINIC | Age: 83
End: 2018-06-26

## 2018-06-26 NOTE — PROGRESS NOTES
Patients daughter was notified of stress/echo test results and that he needed to keep follow up as scheduled. Patients daughter was notified if pt started having any new or worsening s/s to go to ER. Pauline verbalized understanding and had no further questions at this time. -JACIEL;TYLER

## 2018-07-02 ENCOUNTER — TELEPHONE (OUTPATIENT)
Dept: CARDIOLOGY | Facility: CLINIC | Age: 83
End: 2018-07-02

## 2018-07-02 NOTE — TELEPHONE ENCOUNTER
----- Message from Tiffani Butterfield MA sent at 7/2/2018  8:32 AM EDT -----  Patient's daughter called stating the patient is still having chest pains and shortness of breath. He has an appointment to see Dr. Holder on 7/17 but they are wanting to know if they can get in sooner. Call back for the daughter is [phone]

## 2018-07-02 NOTE — TELEPHONE ENCOUNTER
I CALLED AND SPOKE WITH PATIENTS DAUGHTER MAGDALENA. NOTIFIED HER TO HAVE PATIENT HERE @ 1300 PM TOMORROW 07/03/2018. -JACIEL;TYLER

## 2018-07-03 ENCOUNTER — OFFICE VISIT (OUTPATIENT)
Dept: CARDIOLOGY | Facility: CLINIC | Age: 83
End: 2018-07-03

## 2018-07-03 ENCOUNTER — LAB (OUTPATIENT)
Dept: LAB | Facility: HOSPITAL | Age: 83
End: 2018-07-03

## 2018-07-03 VITALS
SYSTOLIC BLOOD PRESSURE: 131 MMHG | OXYGEN SATURATION: 98 % | DIASTOLIC BLOOD PRESSURE: 66 MMHG | BODY MASS INDEX: 21.92 KG/M2 | HEART RATE: 71 BPM | HEIGHT: 69 IN | WEIGHT: 148 LBS

## 2018-07-03 DIAGNOSIS — I25.118 CORONARY ARTERY DISEASE OF NATIVE ARTERY OF NATIVE HEART WITH STABLE ANGINA PECTORIS (HCC): ICD-10-CM

## 2018-07-03 DIAGNOSIS — R07.2 PRECORDIAL PAIN: ICD-10-CM

## 2018-07-03 DIAGNOSIS — R06.02 SOB (SHORTNESS OF BREATH): ICD-10-CM

## 2018-07-03 DIAGNOSIS — R94.39 ABNORMAL STRESS TEST: ICD-10-CM

## 2018-07-03 DIAGNOSIS — I10 ESSENTIAL HYPERTENSION: ICD-10-CM

## 2018-07-03 DIAGNOSIS — I73.9 CLAUDICATION (HCC): ICD-10-CM

## 2018-07-03 DIAGNOSIS — R07.2 PRECORDIAL PAIN: Primary | ICD-10-CM

## 2018-07-03 PROCEDURE — 80053 COMPREHEN METABOLIC PANEL: CPT | Performed by: PHYSICIAN ASSISTANT

## 2018-07-03 PROCEDURE — 36415 COLL VENOUS BLD VENIPUNCTURE: CPT

## 2018-07-03 PROCEDURE — 99214 OFFICE O/P EST MOD 30 MIN: CPT | Performed by: PHYSICIAN ASSISTANT

## 2018-07-03 PROCEDURE — 85025 COMPLETE CBC W/AUTO DIFF WBC: CPT | Performed by: PHYSICIAN ASSISTANT

## 2018-07-03 RX ORDER — NITROGLYCERIN 0.4 MG/1
TABLET SUBLINGUAL
Qty: 100 TABLET | Refills: 11 | Status: SHIPPED | OUTPATIENT
Start: 2018-07-03 | End: 2019-11-29 | Stop reason: SDUPTHER

## 2018-07-03 RX ORDER — CLOPIDOGREL BISULFATE 75 MG/1
75 TABLET ORAL DAILY
Qty: 30 TABLET | Refills: 11 | Status: SHIPPED | OUTPATIENT
Start: 2018-07-03 | End: 2019-05-15

## 2018-07-03 NOTE — PATIENT INSTRUCTIONS
Coronary Angiogram With Stent  Coronary angiogram with stent placement is a procedure to widen or open a narrow blood vessel of the heart (coronary artery). Arteries may become blocked by cholesterol buildup (plaques) in the lining of the wall. When a coronary artery becomes partially blocked, blood flow to that area decreases. This may lead to chest pain or a heart attack (myocardial infarction).  A stent is a small piece of metal that looks like mesh or a spring. Stent placement may be done as treatment for a heart attack or right after a coronary angiogram in which a blocked artery is found.  Let your health care provider know about:  · Any allergies you have.  · All medicines you are taking, including vitamins, herbs, eye drops, creams, and over-the-counter medicines.  · Any problems you or family members have had with anesthetic medicines.  · Any blood disorders you have.  · Any surgeries you have had.  · Any medical conditions you have.  · Whether you are pregnant or may be pregnant.  What are the risks?  Generally, this is a safe procedure. However, problems may occur, including:  · Damage to the heart or its blood vessels.  · A return of blockage.  · Bleeding, infection, or bruising at the insertion site.  · A collection of blood under the skin (hematoma) at the insertion site.  · A blood clot in another part of the body.  · Kidney injury.  · Allergic reaction to the dye or contrast that is used.  · Bleeding into the abdomen (retroperitoneal bleeding).    What happens before the procedure?  Staying hydrated  Follow instructions from your health care provider about hydration, which may include:  · Up to 2 hours before the procedure - you may continue to drink clear liquids, such as water, clear fruit juice, black coffee, and plain tea.    Eating and drinking restrictions  Follow instructions from your health care provider about eating and drinking, which may include:  · 8 hours before the procedure - stop  eating heavy meals or foods such as meat, fried foods, or fatty foods.  · 6 hours before the procedure - stop eating light meals or foods, such as toast or cereal.  · 2 hours before the procedure - stop drinking clear liquids.    Ask your health care provider about:  · Changing or stopping your regular medicines. This is especially important if you are taking diabetes medicines or blood thinners.  · Taking medicines such as ibuprofen. These medicines can thin your blood. Do not take these medicines before your procedure if your health care provider instructs you not to. Generally, aspirin is recommended before a procedure of passing a small, thin tube (catheter) through a blood vessel and into the heart (cardiac catheterization).    What happens during the procedure?  · An IV tube will be inserted into one of your veins.  · You will be given one or more of the following:  ? A medicine to help you relax (sedative).  ? A medicine to numb the area where the catheter will be inserted into an artery (local anesthetic).  · To reduce your risk of infection:  ? Your health care team will wash or sanitize their hands.  ? Your skin will be washed with soap.  ? Hair may be removed from the area where the catheter will be inserted.  · Using a guide wire, the catheter will be inserted into an artery. The location may be in your groin, in your wrist, or in the fold of your arm (near your elbow).  · A type of X-ray (fluoroscopy) will be used to help guide the catheter to the opening of the arteries in the heart.  · A dye will be injected into the catheter, and X-rays will be taken. The dye will help to show where any narrowing or blockages are located in the arteries.  · A tiny wire will be guided to the blocked spot, and a balloon will be inflated to make the artery wider.  · The stent will be expanded and will crush the plaques into the wall of the vessel. The stent will hold the area open and improve the blood flow. Most stents  have a drug coating to reduce the risk of the stent narrowing over time.  · The artery may be made wider using a drill, laser, or other tools to remove plaques.  · When the blood flow is better, the catheter will be removed. The lining of the artery will grow over the stent, which stays where it was placed.  This procedure may vary among health care providers and hospitals.  What happens after the procedure?  · If the procedure is done through the leg, you will be kept in bed lying flat for about 6 hours. You will be instructed to not bend and not cross your legs.  · The insertion site will be checked frequently.  · The pulse in your foot or wrist will be checked frequently.  · You may have additional blood tests, X-rays, and a test that records the electrical activity of your heart (electrocardiogram, or ECG).  This information is not intended to replace advice given to you by your health care provider. Make sure you discuss any questions you have with your health care provider.  Document Released: 06/23/2004 Document Revised: 08/17/2017 Document Reviewed: 07/23/2017  Elsevier Interactive Patient Education © 2018 [employer]

## 2018-07-03 NOTE — PROGRESS NOTES
Problem list     Subjective   Steven López is a 89 y.o. male     Chief Complaint   Patient presents with   • Chest Pain     patient appears in office today stating he has been having CP with exertion    • Shortness of Breath   PROBLEM LIST:      1. Coronary artery disease with multiple pci's in the past.  1.1 CABG, March 2003 with LIMA to LAD, saphenous vein graft of first and second posterolateral marginal circumflex.  1.2 Repeat CABG, October 2007 with autologous saphenous vein to the LAD and diagonal vessels.  1.3 Follow up stress in August 2015 demonstrating posterolateral ischemia  1.3 History of multiple percutaneous interventions.  2. 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 Hypertension  4. Dyslipidemia  5. Dizziness       6. Fatigue     HPI    The patient presents back in today to review results of stress test, echo, and carotid duplex.  The patient tells me that his chest discomfort has worsened.  He reports an intense band across the precordium of the chest.  This is an intense precordial pressure.  He does not take nitroglycerin at this time.  His chest discomfort comes with low levels of exertion now.  He tells me his chest discomfort just walking to the mailbox.  He had chest discomfort while walking into the clinic today.  His chest discomfort resolves once resting.  This isn't what he experienced with previous stenting and intervention procedures otherwise.  His dyspnea has worsened.  He has increasing weakness and fatigue.  He feels very poorly at this time.    Stress test suggested inferobasilar and posterior lateral wall ischemia.  Echo suggested preserved systolic function with no significant valvular issues.  Carotid duplex suggested nonobstructive disease in either carotid systems.  He is concerned with symptoms and would like to proceed on with catheterization, especially in the setting of abnormal stress test.  Current  Outpatient Prescriptions   Medication Sig Dispense Refill   • aspirin (ASPIRIN LOW DOSE) 81 MG EC tablet Take 81 mg by mouth Daily.     • Calcium-Magnesium-Vitamin D - MG-MG-UNIT tablet sustained-release 24 hour Take  by mouth. Doesn't take every day     • cefuroxime (CEFTIN) 250 MG tablet 2 (Two) Times a Day.     • Coenzyme Q10 (CO Q-10) 100 MG capsule Take 100 mg by mouth 2 (Two) Times a Day.  0   • dorzolamide (TRUSOPT) 2 % ophthalmic solution Administer 1 drop to both eyes Daily.     • escitalopram (LEXAPRO) 10 MG tablet TAKE ONE TABLET BY MOUTH ONE TIME DAILY  30 tablet 2   • finasteride (PROSCAR) 5 MG tablet Take 5 mg by mouth Daily.     • HYDROcodone-acetaminophen (NORCO) 5-325 MG per tablet Take 1 tablet by mouth Every 8 (Eight) Hours As Needed for Mild Pain .     • Ibuprofen 200 MG capsule Take  by mouth 2 (Two) Times a Day.     • latanoprost (XALATAN) 0.005 % ophthalmic solution Apply 125 mcg to eye daily.     • levothyroxine (SYNTHROID, LEVOTHROID) 50 MCG tablet Take 50 mcg by mouth Daily.     • omeprazole (PriLOSEC) 20 MG capsule Take 20 mg by mouth Daily.     • Polyethylene Glycol 3350 (MIRALAX PO) Take  by mouth Daily As Needed.     • pravastatin (PRAVACHOL) 20 MG tablet Take 1 tablet by mouth Daily. 90 tablet 3   • clopidogrel (PLAVIX) 75 MG tablet Take 1 tablet by mouth Daily. 30 tablet 11   • metoprolol tartrate (LOPRESSOR) 25 MG tablet Take 1 tablet by mouth 2 (Two) Times a Day. 60 tablet 11   • nitroglycerin (NITROSTAT) 0.4 MG SL tablet 1 under the tongue as needed for angina, may repeat q5mins for up three doses 100 tablet 11     No current facility-administered medications for this visit.        Penicillins and Sulfa antibiotics    Past Medical History:   Diagnosis Date   • 3-vessel CAD    • Angina pectoris (CMS/HCC)    • Arthritis    • Dyslipidemia    • Enlarged prostate    • Glaucoma    • Hypertension        Social History     Social History   • Marital status:      Spouse  name: N/A   • Number of children: N/A   • Years of education: N/A     Occupational History   • Not on file.     Social History Main Topics   • Smoking status: Former Smoker   • Smokeless tobacco: Never Used   • Alcohol use No   • Drug use: No   • Sexual activity: Defer     Other Topics Concern   • Not on file     Social History Narrative   • No narrative on file       Family History   Problem Relation Age of Onset   • Stroke Mother    • Stroke Father    • Other Brother         CABG   • COPD Brother         Pulmonary Disease   • Stroke Other        Review of Systems   Constitutional: Positive for fatigue (easily fatigued).   HENT: Negative for congestion, rhinorrhea, sneezing and sore throat.    Eyes: Positive for visual disturbance (wears reading glasses ).   Respiratory: Positive for chest tightness (chest tightness with exertion ) and shortness of breath (easily SOA ; worse on exertion ). Negative for apnea, cough and wheezing.    Cardiovascular: Positive for chest pain (precordial pain ; worse on exertion ) and leg swelling (BLE swelling/edema). Negative for palpitations (denies palpitations).   Gastrointestinal: Negative.  Negative for abdominal distention, abdominal pain, nausea and vomiting.   Endocrine: Negative.  Negative for cold intolerance, heat intolerance, polyphagia and polyuria.   Genitourinary: Negative.  Negative for difficulty urinating, frequency and urgency.   Musculoskeletal: Positive for arthralgias (joints). Negative for back pain, myalgias, neck pain and neck stiffness.   Skin: Negative.  Negative for rash and wound.   Allergic/Immunologic: Negative.  Negative for environmental allergies and food allergies.   Neurological: Positive for dizziness (occasional dizziness), weakness (generalized ), light-headedness (occasional light headedness with exertion ) and headaches (H/A's behind L eye). Negative for syncope.   Hematological: Bruises/bleeds easily (bruises and bleeds easily).  "  Psychiatric/Behavioral: Negative.  Negative for agitation, confusion and sleep disturbance (denies waking up smothering/SOA). The patient is not nervous/anxious.        Objective   Vitals:    07/03/18 1307   BP: 131/66   BP Location: Left arm   Patient Position: Sitting   Pulse: 71   SpO2: 98%   Weight: 67.1 kg (148 lb)   Height: 175.3 cm (69\")      /66 (BP Location: Left arm, Patient Position: Sitting)   Pulse 71   Ht 175.3 cm (69\")   Wt 67.1 kg (148 lb)   SpO2 98%   BMI 21.86 kg/m²    Lab Results (most recent)     None        Physical Exam   Constitutional: He is oriented to person, place, and time. He appears well-developed and well-nourished. No distress.   HENT:   Head: Normocephalic and atraumatic.   Eyes: Conjunctivae and EOM are normal. Pupils are equal, round, and reactive to light.   Neck: Normal range of motion. Neck supple. Normal carotid pulses, no hepatojugular reflux and no JVD present. Carotid bruit is present (Right, soft.). No tracheal deviation present. No thyroid mass and no thyromegaly present.   Cardiovascular: Normal rate, regular rhythm, S1 normal, S2 normal, normal heart sounds and intact distal pulses.  Exam reveals no gallop, no S3, no S4, no distant heart sounds and no friction rub.    No murmur heard.   No systolic murmur is present    No diastolic murmur is present   Pulmonary/Chest: Effort normal. He has decreased breath sounds (moderately reduced breath sounds). He has no wheezes. He has no rhonchi. He has no rales.   Abdominal: Soft. Bowel sounds are normal. He exhibits no distension, no abdominal bruit and no mass. There is no tenderness. There is no rebound and no guarding.   Negative organomegaly      Musculoskeletal: Normal range of motion. He exhibits edema (2+ edema to lower calf RLE, 1+ edema LLE). He exhibits no tenderness or deformity.   Lymphadenopathy:     He has no cervical adenopathy.     He has no axillary adenopathy.   Neurological: He is alert and " oriented to person, place, and time.   Skin: Skin is warm and dry. No rash noted. No erythema. No pallor.   Psychiatric: He has a normal mood and affect. His speech is normal and behavior is normal. Judgment and thought content normal. Cognition and memory are normal.   Nursing note and vitals reviewed.        Procedure   Procedures       Assessment/Plan      Diagnosis Plan   1. Precordial pain  clopidogrel (PLAVIX) 75 MG tablet    metoprolol tartrate (LOPRESSOR) 25 MG tablet    nitroglycerin (NITROSTAT) 0.4 MG SL tablet    Cardiac catheterization    CBC & Differential    Comprehensive Metabolic Panel    Duplex Lower Extremity Art / Grafts - Bilateral CAR   2. Coronary artery disease of native artery of native heart with stable angina pectoris (CMS/HCC)  clopidogrel (PLAVIX) 75 MG tablet    metoprolol tartrate (LOPRESSOR) 25 MG tablet    nitroglycerin (NITROSTAT) 0.4 MG SL tablet    Cardiac catheterization    CBC & Differential    Comprehensive Metabolic Panel    Duplex Lower Extremity Art / Grafts - Bilateral CAR   3. Abnormal stress test  clopidogrel (PLAVIX) 75 MG tablet    metoprolol tartrate (LOPRESSOR) 25 MG tablet    nitroglycerin (NITROSTAT) 0.4 MG SL tablet    Cardiac catheterization    CBC & Differential    Comprehensive Metabolic Panel    Duplex Lower Extremity Art / Grafts - Bilateral CAR   4. Essential hypertension  clopidogrel (PLAVIX) 75 MG tablet    metoprolol tartrate (LOPRESSOR) 25 MG tablet    nitroglycerin (NITROSTAT) 0.4 MG SL tablet    Cardiac catheterization    CBC & Differential    Comprehensive Metabolic Panel    Duplex Lower Extremity Art / Grafts - Bilateral CAR   5. SOB (shortness of breath)  clopidogrel (PLAVIX) 75 MG tablet    metoprolol tartrate (LOPRESSOR) 25 MG tablet    nitroglycerin (NITROSTAT) 0.4 MG SL tablet    Cardiac catheterization    CBC & Differential    Comprehensive Metabolic Panel    Duplex Lower Extremity Art / Grafts - Bilateral CAR   6. Claudication (CMS/HCC)  Duplex  Lower Extremity Art / Grafts - Bilateral CAR       With low level symptoms compatible with angina/ischemia, and abnormal stress test with inferobasilar and posterior lateral wall ischemia, the patient will need catheterization.  I will continue current medical regimen of aspirin and statin therapy.  I will add Plavix 75 mg daily.  He will need metoprolol 25 mg twice a day for further cardioprotection.  We will given nitroglycerin to use as needed.  I would like to evaluate laboratories before his catheterization.    Not noted above, the patient has lower extremity discomfort concerning for claudication.  I feel that he needs evaluation for vascular disease before femoral artery approach is taken for catheterization.  We will try to arrange that today to the clinic.  Further pending results of all the above.  The patient will call for any complications.           Patient's Body mass index is 21.86 kg/m². BMI is within normal parameters. No follow-up required.             Electronically signed by:

## 2018-07-04 LAB
ALBUMIN SERPL-MCNC: 4.5 G/DL (ref 3.4–4.8)
ALBUMIN/GLOB SERPL: 1.7 G/DL (ref 1.5–2.5)
ALP SERPL-CCNC: 79 U/L (ref 40–129)
ALT SERPL W P-5'-P-CCNC: 21 U/L (ref 10–44)
ANION GAP SERPL CALCULATED.3IONS-SCNC: 7.6 MMOL/L (ref 3.6–11.2)
AST SERPL-CCNC: 25 U/L (ref 10–34)
BASOPHILS # BLD AUTO: 0.03 10*3/MM3 (ref 0–0.3)
BASOPHILS NFR BLD AUTO: 0.4 % (ref 0–2)
BILIRUB SERPL-MCNC: 0.3 MG/DL (ref 0.2–1.8)
BUN BLD-MCNC: 27 MG/DL (ref 7–21)
BUN/CREAT SERPL: 23.3 (ref 7–25)
CALCIUM SPEC-SCNC: 9.3 MG/DL (ref 7.7–10)
CHLORIDE SERPL-SCNC: 109 MMOL/L (ref 99–112)
CO2 SERPL-SCNC: 22.4 MMOL/L (ref 24.3–31.9)
CREAT BLD-MCNC: 1.16 MG/DL (ref 0.43–1.29)
DEPRECATED RDW RBC AUTO: 42.1 FL (ref 37–54)
EOSINOPHIL # BLD AUTO: 0.11 10*3/MM3 (ref 0–0.7)
EOSINOPHIL NFR BLD AUTO: 1.4 % (ref 0–7)
ERYTHROCYTE [DISTWIDTH] IN BLOOD BY AUTOMATED COUNT: 12.7 % (ref 11.5–14.5)
GFR SERPL CREATININE-BSD FRML MDRD: 59 ML/MIN/1.73
GLOBULIN UR ELPH-MCNC: 2.6 GM/DL
GLUCOSE BLD-MCNC: 115 MG/DL (ref 70–110)
HCT VFR BLD AUTO: 40.3 % (ref 42–52)
HGB BLD-MCNC: 13.3 G/DL (ref 14–18)
IMM GRANULOCYTES # BLD: 0.02 10*3/MM3 (ref 0–0.03)
IMM GRANULOCYTES NFR BLD: 0.3 % (ref 0–0.5)
LYMPHOCYTES # BLD AUTO: 2.26 10*3/MM3 (ref 1–3)
LYMPHOCYTES NFR BLD AUTO: 28.5 % (ref 16–46)
MCH RBC QN AUTO: 31.1 PG (ref 27–33)
MCHC RBC AUTO-ENTMCNC: 33 G/DL (ref 33–37)
MCV RBC AUTO: 94.2 FL (ref 80–94)
MONOCYTES # BLD AUTO: 0.93 10*3/MM3 (ref 0.1–0.9)
MONOCYTES NFR BLD AUTO: 11.7 % (ref 0–12)
NEUTROPHILS # BLD AUTO: 4.59 10*3/MM3 (ref 1.4–6.5)
NEUTROPHILS NFR BLD AUTO: 57.7 % (ref 40–75)
OSMOLALITY SERPL CALC.SUM OF ELEC: 283.6 MOSM/KG (ref 273–305)
PLATELET # BLD AUTO: 136 10*3/MM3 (ref 130–400)
PMV BLD AUTO: 11.5 FL (ref 6–10)
POTASSIUM BLD-SCNC: 4.6 MMOL/L (ref 3.5–5.3)
PROT SERPL-MCNC: 7.1 G/DL (ref 6–8)
RBC # BLD AUTO: 4.28 10*6/MM3 (ref 4.7–6.1)
SODIUM BLD-SCNC: 139 MMOL/L (ref 135–153)
WBC NRBC COR # BLD: 7.94 10*3/MM3 (ref 4.5–12.5)

## 2018-07-11 ENCOUNTER — TELEPHONE (OUTPATIENT)
Dept: CARDIOLOGY | Facility: CLINIC | Age: 83
End: 2018-07-11

## 2018-07-11 ENCOUNTER — OUTSIDE FACILITY SERVICE (OUTPATIENT)
Dept: CARDIOLOGY | Facility: CLINIC | Age: 83
End: 2018-07-11

## 2018-07-11 PROCEDURE — 92928 PRQ TCAT PLMT NTRAC ST 1 LES: CPT | Performed by: INTERNAL MEDICINE

## 2018-07-11 PROCEDURE — 93459 L HRT ART/GRFT ANGIO: CPT | Performed by: INTERNAL MEDICINE

## 2018-07-11 NOTE — TELEPHONE ENCOUNTER
Patients daughter was notified of ultrasound results and to keep follow up as scheduled. -JACIEL;TYLER

## 2018-07-11 NOTE — TELEPHONE ENCOUNTER
----- Message from Tiffani Butterfield MA sent at 7/11/2018 11:58 AM EDT -----  Calling for ultrasound results on legs. Call back is [phone]

## 2018-07-12 ENCOUNTER — OUTSIDE FACILITY SERVICE (OUTPATIENT)
Dept: CARDIOLOGY | Facility: CLINIC | Age: 83
End: 2018-07-12

## 2018-07-17 ENCOUNTER — OFFICE VISIT (OUTPATIENT)
Dept: CARDIOLOGY | Facility: CLINIC | Age: 83
End: 2018-07-17

## 2018-07-17 ENCOUNTER — TELEPHONE (OUTPATIENT)
Dept: CARDIOLOGY | Facility: CLINIC | Age: 83
End: 2018-07-17

## 2018-07-17 VITALS
DIASTOLIC BLOOD PRESSURE: 73 MMHG | SYSTOLIC BLOOD PRESSURE: 127 MMHG | HEART RATE: 79 BPM | BODY MASS INDEX: 22.16 KG/M2 | WEIGHT: 149.6 LBS | HEIGHT: 69 IN | OXYGEN SATURATION: 97 %

## 2018-07-17 DIAGNOSIS — E78.5 DYSLIPIDEMIA: ICD-10-CM

## 2018-07-17 DIAGNOSIS — I25.810 CORONARY ARTERY DISEASE INVOLVING CORONARY BYPASS GRAFT OF NATIVE HEART WITHOUT ANGINA PECTORIS: ICD-10-CM

## 2018-07-17 DIAGNOSIS — E78.2 MIXED HYPERLIPIDEMIA: ICD-10-CM

## 2018-07-17 DIAGNOSIS — R53.82 CHRONIC FATIGUE: Primary | ICD-10-CM

## 2018-07-17 DIAGNOSIS — R42 DIZZINESS: ICD-10-CM

## 2018-07-17 DIAGNOSIS — I25.10 3-VESSEL CAD: ICD-10-CM

## 2018-07-17 DIAGNOSIS — I25.709 CORONARY ARTERY DISEASE INVOLVING CORONARY BYPASS GRAFT OF NATIVE HEART WITH ANGINA PECTORIS (HCC): ICD-10-CM

## 2018-07-17 DIAGNOSIS — I10 ESSENTIAL HYPERTENSION: ICD-10-CM

## 2018-07-17 DIAGNOSIS — R06.02 SHORTNESS OF BREATH: ICD-10-CM

## 2018-07-17 PROCEDURE — 99214 OFFICE O/P EST MOD 30 MIN: CPT | Performed by: INTERNAL MEDICINE

## 2018-07-17 RX ORDER — ATORVASTATIN CALCIUM 40 MG/1
40 TABLET, FILM COATED ORAL DAILY
Qty: 90 TABLET | Refills: 3 | Status: SHIPPED | OUTPATIENT
Start: 2018-07-17 | End: 2018-10-19

## 2018-07-17 RX ORDER — PRAVASTATIN SODIUM 40 MG
40 TABLET ORAL DAILY
Qty: 90 TABLET | Refills: 3 | Status: SHIPPED | OUTPATIENT
Start: 2018-07-17 | End: 2018-07-17 | Stop reason: ALTCHOICE

## 2018-07-17 NOTE — PROGRESS NOTES
Subjective   Steven López is a 89 y.o. male     Chief Complaint   Patient presents with   • Follow-up     [facility] f/u, lower ext. duplex f/u    • Results     lower extremity duplex    • Coronary Artery Disease     chest pains have improved    • Hypertension   • Hyperlipidemia   • Shortness of Breath       PROBLEM LIST:     1. Coronary artery disease with multiple pci's in the past.  1.1 CABG, March 2003 with LIMA to LAD, saphenous vein graft of first and second posterolateral marginal circumflex.  1.2 Repeat CABG, October 2007 with autologous saphenous vein to the LAD and diagonal vessels.  1.3 Follow up stress in August 2015 demonstrating posterolateral ischemia  1.3 History of multiple percutaneous interventions  1.4 [facility] 07/11/18 stenting x2 RCA; Dr. Fish Holder   2. 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 Hypertension  4. Dyslipidemia  5. Dizziness       6. Fatigue       Specialty Problems        Cardiology Problems    3-vessel CAD        Angina pectoris (CMS/HCC)        Hypertension        Hypotension        Coronary artery disease involving native coronary artery of native heart without angina pectoris                HPI:  Mr. López returns after stenting of subtotal stenosis of the right coronary artery is setting of very low-level progressive exertional angina.    Catheterization last week demonstrated occluded grafts but the only significant stenosis being somewhat segmental subtotal stenosis in the mid right coronary artery.  Stenting was performed without difficulty.  Since that time Mr. López is had one to 2 episodes a short-lived mild discomfort with physical activity, but nothing like the severe pain and dyspnea that he had walking to his mailbox previously.  He continues to be without orthopnea, PND, or change in lower extremity edema.  He has chronic dizziness which has not changed she is not been presyncopal or syncopal.  He  doesn't palpitate but has persistent shortness of breath.  Mr. López relates that his shortness of breath has improved somewhat after stenting.  He has mild ecchymosis but no catheter site complications.  CURRENT MEDICATION:    Current Outpatient Prescriptions   Medication Sig Dispense Refill   • aspirin (ASPIRIN LOW DOSE) 81 MG EC tablet Take 81 mg by mouth Daily.     • Calcium-Magnesium-Vitamin D - MG-MG-UNIT tablet sustained-release 24 hour Take  by mouth. Doesn't take every day     • cefuroxime (CEFTIN) 250 MG tablet 2 (Two) Times a Day.     • clopidogrel (PLAVIX) 75 MG tablet Take 1 tablet by mouth Daily. 30 tablet 11   • Coenzyme Q10 (CO Q-10) 100 MG capsule Take 100 mg by mouth 2 (Two) Times a Day.  0   • dorzolamide (TRUSOPT) 2 % ophthalmic solution Administer 1 drop to both eyes Daily.     • escitalopram (LEXAPRO) 10 MG tablet TAKE ONE TABLET BY MOUTH ONE TIME DAILY  30 tablet 2   • finasteride (PROSCAR) 5 MG tablet Take 5 mg by mouth Daily.     • HYDROcodone-acetaminophen (NORCO) 5-325 MG per tablet Take 1 tablet by mouth Every 8 (Eight) Hours As Needed for Mild Pain .     • Ibuprofen 200 MG capsule Take  by mouth 2 (Two) Times a Day.     • latanoprost (XALATAN) 0.005 % ophthalmic solution Apply 125 mcg to eye daily.     • levothyroxine (SYNTHROID, LEVOTHROID) 50 MCG tablet Take 50 mcg by mouth Daily.     • metoprolol tartrate (LOPRESSOR) 25 MG tablet Take 1 tablet by mouth 2 (Two) Times a Day. 60 tablet 11   • nitroglycerin (NITROSTAT) 0.4 MG SL tablet 1 under the tongue as needed for angina, may repeat q5mins for up three doses 100 tablet 11   • omeprazole (PriLOSEC) 20 MG capsule Take 20 mg by mouth Daily.     • Polyethylene Glycol 3350 (MIRALAX PO) Take  by mouth Daily As Needed.     • pravastatin (PRAVACHOL) 20 MG tablet Take 1 tablet by mouth Daily. 90 tablet 3     No current facility-administered medications for this visit.        ALLERGIES:    Penicillins and Sulfa antibiotics    PAST  MEDICAL HISTORY:    Past Medical History:   Diagnosis Date   • 3-vessel CAD    • Angina pectoris (CMS/HCC)    • Arthritis    • Dyslipidemia    • Enlarged prostate    • Glaucoma    • Hypertension        SURGICAL HISTORY:    Past Surgical History:   Procedure Laterality Date   • CARDIAC CATHETERIZATION      [facility] 07/2018, stenting to RCA x2   • CORONARY ARTERY BYPASS GRAFT      x2   • CORONARY STENT PLACEMENT     • INSERT / REPLACE / REMOVE PACEMAKER     • PROSTATE SURGERY     • TONSILLECTOMY         SOCIAL HISTORY:    Social History     Social History   • Marital status:      Spouse name: N/A   • Number of children: N/A   • Years of education: N/A     Occupational History   • Not on file.     Social History Main Topics   • Smoking status: Former Smoker   • Smokeless tobacco: Never Used   • Alcohol use No   • Drug use: No   • Sexual activity: Defer     Other Topics Concern   • Not on file     Social History Narrative   • No narrative on file       FAMILY HISTORY:    Family History   Problem Relation Age of Onset   • Stroke Mother    • Stroke Father    • Other Brother         CABG   • COPD Brother         Pulmonary Disease   • Stroke Other        Review of Systems   Constitutional: Positive for activity change (limited to to musc. weakness and shortness of breath ) and fatigue. Negative for chills, diaphoresis and fever.   HENT: Negative for congestion, dental problem, drooling, ear discharge, ear pain, facial swelling, hearing loss, mouth sores, nosebleeds, postnasal drip, rhinorrhea, sinus pain, sinus pressure, sneezing, sore throat, tinnitus, trouble swallowing and voice change.    Eyes: Negative for visual disturbance.   Respiratory: Positive for shortness of breath. Negative for cough, choking, chest tightness, wheezing and stridor.    Cardiovascular: Positive for chest pain (improved since [facility]) and leg swelling (mild around sockline BLE). Negative for palpitations.   Gastrointestinal: Negative for abdominal  "pain, blood in stool (no melena, no hematuria, no hematemesis no hematochezia ), constipation, diarrhea, nausea and vomiting.   Endocrine: Negative for cold intolerance and heat intolerance.   Genitourinary: Positive for difficulty urinating (burning sensation ), frequency and urgency. Negative for hematuria.   Musculoskeletal: Positive for arthralgias and gait problem (ambulates with aid of cane ).   Skin: Negative for color change, pallor, rash and wound.   Allergic/Immunologic: Negative for environmental allergies, food allergies and immunocompromised state.   Neurological: Positive for weakness and light-headedness (constant ). Negative for dizziness, tremors, seizures, syncope, facial asymmetry, speech difficulty, numbness and headaches.   Hematological: Negative for adenopathy. Does not bruise/bleed easily.       VISIT VITALS:  Vitals:    07/17/18 0950   BP: 127/73   BP Location: Left arm   Patient Position: Sitting   Pulse: 79   SpO2: 97%   Weight: 67.9 kg (149 lb 9.6 oz)   Height: 175.3 cm (69\")      /73 (BP Location: Left arm, Patient Position: Sitting)   Pulse 79   Ht 175.3 cm (69\")   Wt 67.9 kg (149 lb 9.6 oz)   SpO2 97%   BMI 22.09 kg/m²     RECENT LABS:    Objective       Physical Exam    Procedures      Assessment/Plan    #1.  Coronary artery disease status post stenting of the right coronary artery.  Mr. López is much improved from the standpoint of angina and has had some improvement in exertional dyspnea as well.  Therefore, I feel that no further evaluation or change in treatment is indicated at this time fatigue early given the fact that there is no other substrate for mechanical revascularization demonstrated by recent cardiac catheterization.    #2.  I would like to see Mr. López on high-dose statin.  Therefore we will change pravastatin 20 atorvastatin 40 mg daily and check fasting lipid profile liver enzymes in 6-8 weeks.  The patient will continue his coenzyme every 10.    #3.  " The patient was encouraged to try to increase his physical capacity.    #4.  Mr. López will follow with Dr. Rodriguez per his instructions, and in our office on a when necessary basis for medication intolerance, for symptoms as discussed, or in 6 months.              No diagnosis found.    No Follow-up on file.              Patient's Body mass index is 22.09 kg/m². BMI is within normal parameters. No follow-up required.       Dolly Underwood MA  Scribed for Dr. Fish Holder by YAZMIN Hallman July 17, 2018 9:54 AM           Electronically signed by:            This note is dictated utilizing voice recognition software.  Although this record has been proof read, transcriptional errors may still be present. If questions occur regarding the content of this record please do not hesitate to call our office.

## 2018-07-17 NOTE — TELEPHONE ENCOUNTER
Fatemeh from  pharmacy called to verify the patient statin meds. I verified that the patient was d/c on pravastatin and is now on atorvastatin.

## 2018-07-23 ENCOUNTER — TELEPHONE (OUTPATIENT)
Dept: CARDIOLOGY | Facility: CLINIC | Age: 83
End: 2018-07-23

## 2018-07-23 NOTE — TELEPHONE ENCOUNTER
P daughter, ponce Carpenter states he continues to be dizzy and seems to have worsened since started on metoprolol 25 mg. Patient has only been taking metoprolol 25 mg once daily and she overlooked instruction on bottle after SAVANNA Marques prescribed prior to [facility]. I informed her with his dizziness to hold off on giving to him BID. She is going to monitor his HR and SALINA for the next week about 1 hr after taking his metoprolol 25 mg. I informed her that the Proscar could be a contributing factor to his dizziness as well. Patient daughter verbalized understanding. SONNY/YAZMIN       ----- Message from Tiffani Butterfield MA sent at 7/23/2018 10:48 AM EDT -----  The patient's daughter Ponce called stating that the patient is feeling more dizzy since being put on Plavix and metoprolol. Patient also stated that the patient has been placed on Lipitor. Patient's daughter is requesting a called back. Call back per daughter is [phone]

## 2018-08-03 ENCOUNTER — TELEPHONE (OUTPATIENT)
Dept: CARDIOLOGY | Facility: CLINIC | Age: 83
End: 2018-08-03

## 2018-08-03 ENCOUNTER — OFFICE VISIT (OUTPATIENT)
Dept: CARDIOLOGY | Facility: CLINIC | Age: 83
End: 2018-08-03

## 2018-08-03 DIAGNOSIS — I45.9 CONDUCTION DISORDER OF THE HEART: Primary | ICD-10-CM

## 2018-08-03 PROCEDURE — 93288 INTERROG EVL PM/LDLS PM IP: CPT | Performed by: INTERNAL MEDICINE

## 2018-08-03 NOTE — TELEPHONE ENCOUNTER
Patient presented to office today for Device Check. While patient was here his daughter requested that we look over his BP log. Per Steven Escobedo, SCOTT BP is running too low and patient to stop his Metoprolol and continue his Proscar at this time. Patient's daughter will continue to monitor BP and call for any changes or concerns.

## 2018-08-20 ENCOUNTER — TELEPHONE (OUTPATIENT)
Dept: CARDIOLOGY | Facility: CLINIC | Age: 83
End: 2018-08-20

## 2018-08-20 NOTE — TELEPHONE ENCOUNTER
"SPOKE WITH DR. VALDEZ REGARDING BELOW AND V/O RECEIVED TO JUST STOP THE LIPITOR COMPLETELY. DAUGHTER, MAGDALENA AWARE. RENATE [place]        DAUGHTER, MAGDALENA, STATES SINCE HE STARTED LIPITOR 4 MG AT LAST VISIT, HE HAS BEEN A LOT MORE DIZZY AND LEGS HURT ALL THE TIME. STATES THEY ARE NOT GOING TO GIVE IT TO HIM TONIGHT AND FOR A WHILE TO SEE IF SX'S IMPROVE. STATES HE TOOK PRAVASTATIN IN PAST AND HIS LEGS HURT HIM THEN TO. STATES B/P \"DOING BETTER\" SINCE DC'D METOPROLOL ON 8-3-18. STATES HE IS DUE TO HAVE REPEAT LABS DONE NEXT WEEK FOR CHOL. TOLD HER I WOULD LET DR. VALDEZ KNOW EITHER SUELLEN. OR NEXT DAY SINCE HE IS AT THE [facility] LAB THIS AFTERNOON AND SHE WAS FINE WITH THIS. RENATE [place]        ----- Message from Tiffani Butterfield MA sent at 8/20/2018 11:25 AM EDT -----  Patient called stating that the patient is dizzy and his legs have been hurting. Call back for the family member is [phone]    "

## 2018-08-22 ENCOUNTER — TELEPHONE (OUTPATIENT)
Dept: CARDIOLOGY | Facility: CLINIC | Age: 83
End: 2018-08-22

## 2018-08-22 NOTE — TELEPHONE ENCOUNTER
"  SPOKE WITH DR. VALDEZ REGARDING BELOW AND V/O RECEIVED TO JUST STOP THE LIPITOR COMPLETELY. DAUGHTER, MAGDALENA AWARE. R[place]ATE [place]        DAUGHTER, MAGDALENA, STATES SINCE HE STARTED LIPITOR 4 MG AT LAST VISIT, HE HAS BEEN A LOT MORE DIZZY AND LEGS HURT ALL THE TIME. STATES THEY ARE NOT GOING TO GIVE IT TO HIM TONIGHT AND FOR A WHILE TO SEE IF SX'S IMPROVE. STATES HE TOOK PRAVASTATIN IN PAST AND HIS LEGS HURT HIM THEN TO. STATES B/P \"DOING BETTER\" SINCE DC'D METOPROLOL ON 8-3-18. STATES HE IS DUE TO HAVE REPEAT LABS DONE NEXT WEEK FOR CHOL. TOLD HER I WOULD LET DR. VALDEZ KNOW EITHER SUELLEN. OR NEXT DAY SINCE HE IS AT THE [facility] LAB THIS AFTERNOON AND SHE WAS FINE WITH THIS. RENATE [place]      ----- Message from Tiffani Butterfield MA sent at 8/22/2018  1:38 PM EDT -----  Patient is calling to talk to you about his lipitor. Call back is [phone]    "

## 2018-08-27 RX ORDER — ESCITALOPRAM OXALATE 10 MG/1
TABLET ORAL
Qty: 30 TABLET | Refills: 1 | Status: SHIPPED | OUTPATIENT
Start: 2018-08-27 | End: 2018-10-29 | Stop reason: SDUPTHER

## 2018-10-19 ENCOUNTER — CLINICAL SUPPORT (OUTPATIENT)
Dept: CARDIOLOGY | Facility: CLINIC | Age: 83
End: 2018-10-19

## 2018-10-19 VITALS
HEART RATE: 74 BPM | SYSTOLIC BLOOD PRESSURE: 149 MMHG | OXYGEN SATURATION: 100 % | HEIGHT: 69 IN | WEIGHT: 150 LBS | DIASTOLIC BLOOD PRESSURE: 66 MMHG | BODY MASS INDEX: 22.22 KG/M2

## 2018-10-19 DIAGNOSIS — R07.2 PRECORDIAL PAIN: Primary | ICD-10-CM

## 2018-10-19 DIAGNOSIS — R06.02 SOB (SHORTNESS OF BREATH): ICD-10-CM

## 2018-10-19 PROCEDURE — 93000 ELECTROCARDIOGRAM COMPLETE: CPT | Performed by: PHYSICIAN ASSISTANT

## 2018-10-19 RX ORDER — ALPRAZOLAM 0.5 MG/1
0.5 TABLET ORAL AS NEEDED
COMMUNITY
End: 2021-03-04 | Stop reason: ALTCHOICE

## 2018-10-19 NOTE — PROGRESS NOTES
Steven López  2/6/1926  10/19/2018   ?   Chief Complaint   Patient presents with   • Chest Pain     patient appears in office today for nurse visit with EKG and symptom check   • Shortness of Breath     patient states he is having increased SOA       ?   HPI:     Patient appears in office today for nurse visit with EKG and symptom check. Patient is accompanied by his daughter. He reports CP and increased SOA within past 2 weeks. Patient was being seen at [facility] today and they called provider Steven Escobedo PA-C of current issues patient concerned while there. Verbal orders were given for patient to come to office for nurse visit.-BH;CCMA  ?   ?   ?     Current Outpatient Prescriptions:   •  Acetaminophen (TYLENOL ARTHRITIS PAIN PO), Take 650 mg by mouth As Needed., Disp: , Rfl:   •  ALPRAZolam (XANAX) 0.5 MG tablet, Take 0.5 mg by mouth As Needed for Anxiety or Sleep., Disp: , Rfl:   •  aspirin (ASPIRIN LOW DOSE) 81 MG EC tablet, Take 81 mg by mouth Daily., Disp: , Rfl:   •  Calcium-Magnesium-Vitamin D - MG-MG-UNIT tablet sustained-release 24 hour, Take  by mouth. Doesn't take every day, Disp: , Rfl:   •  clopidogrel (PLAVIX) 75 MG tablet, Take 1 tablet by mouth Daily., Disp: 30 tablet, Rfl: 11  •  Coenzyme Q10 (CO Q-10) 100 MG capsule, Take 100 mg by mouth 2 (Two) Times a Day., Disp: , Rfl: 0  •  dorzolamide (TRUSOPT) 2 % ophthalmic solution, Administer 1 drop to both eyes Daily., Disp: , Rfl:   •  escitalopram (LEXAPRO) 10 MG tablet, TAKE ONE TABLET BY MOUTH ONE TIME DAILY , Disp: 30 tablet, Rfl: 1  •  finasteride (PROSCAR) 5 MG tablet, Take 5 mg by mouth Daily., Disp: , Rfl:   •  HYDROcodone-acetaminophen (NORCO) 5-325 MG per tablet, Take 1 tablet by mouth Every 8 (Eight) Hours As Needed for Mild Pain ., Disp: , Rfl:   •  Ibuprofen 200 MG capsule, Take  by mouth 2 (Two) Times a Day., Disp: , Rfl:   •  latanoprost (XALATAN) 0.005 % ophthalmic solution, Apply 125 mcg to eye daily.,  Disp: , Rfl:   •  levothyroxine (SYNTHROID, LEVOTHROID) 50 MCG tablet, Take 50 mcg by mouth Daily., Disp: , Rfl:   •  Mirabegron ER (MYRBETRIQ) 25 MG tablet sustained-release 24 hour 24 hr tablet, Take 25 mg by mouth Every Other Day., Disp: , Rfl:   •  nitroglycerin (NITROSTAT) 0.4 MG SL tablet, 1 under the tongue as needed for angina, may repeat q5mins for up three doses, Disp: 100 tablet, Rfl: 11  •  omeprazole (PriLOSEC) 20 MG capsule, Take 20 mg by mouth Daily., Disp: , Rfl:   •  Polyethylene Glycol 3350 (MIRALAX PO), Take  by mouth Daily As Needed., Disp: , Rfl:    ?   ?   Penicillins and Sulfa antibiotics         ECG 12 Lead  Date/Time: 10/19/2018 11:40 AM  Performed by: SALVADOR ESCOBEDO  Authorized by: SALVADOR ESCOBEDO   Comments: Chest pain             ?   Assessment/Plan      1. Chest pain     2. SOA    Patient was seen in office today as nurse visit with EKG and symptom check. EKG was reviewed by Salvador Escobedo PA-C and was unrevealing at this time. Provider went in and discussed current s/s with patient , patient stated he has noticed this while out walking to mail box or with any exertion. Patient stated he has taken Nitro when this has occurred , it did not help nor did he have any s/s from taking the Nitro. Provider suggested we repeat nuclear stress test and echo, patient agreed to have those repeated. Patient stated he did not feel as if he needed to be admitted to hospital due to these s/s. Patient and daughter verified he is taking all of his medications as directed on a daily basis. Patient and daughter were notified of all testing that will need to be performed and verbalized understanding. Patient and daughter were notified if these s/s worsen or increase they are to go to local ER immediatly. -BH;CCMA  ?   ?   ?

## 2018-10-29 RX ORDER — ESCITALOPRAM OXALATE 10 MG/1
TABLET ORAL
Qty: 30 TABLET | Refills: 0 | Status: SHIPPED | OUTPATIENT
Start: 2018-10-29 | End: 2019-02-08 | Stop reason: SDUPTHER

## 2018-11-02 ENCOUNTER — OFFICE VISIT (OUTPATIENT)
Dept: CARDIOLOGY | Facility: CLINIC | Age: 83
End: 2018-11-02

## 2018-11-02 DIAGNOSIS — I45.9 CARDIAC CONDUCTION DISORDER: Primary | ICD-10-CM

## 2018-11-02 PROCEDURE — 93288 INTERROG EVL PM/LDLS PM IP: CPT | Performed by: INTERNAL MEDICINE

## 2018-11-21 ENCOUNTER — HOSPITAL ENCOUNTER (OUTPATIENT)
Dept: CARDIOLOGY | Facility: HOSPITAL | Age: 83
Discharge: HOME | End: 2018-11-21

## 2018-11-21 DIAGNOSIS — R06.02 SOB (SHORTNESS OF BREATH): ICD-10-CM

## 2018-11-21 DIAGNOSIS — R07.2 PRECORDIAL PAIN: ICD-10-CM

## 2018-11-21 PROCEDURE — 93017 CV STRESS TEST TRACING ONLY: CPT

## 2018-11-21 PROCEDURE — 93306 TTE W/DOPPLER COMPLETE: CPT | Performed by: INTERNAL MEDICINE

## 2018-11-21 PROCEDURE — 78452 HT MUSCLE IMAGE SPECT MULT: CPT

## 2018-11-21 PROCEDURE — 25010000002 REGADENOSON 0.4 MG/5ML SOLUTION: Performed by: INTERNAL MEDICINE

## 2018-11-21 PROCEDURE — A9500 TC99M SESTAMIBI: HCPCS | Performed by: INTERNAL MEDICINE

## 2018-11-21 PROCEDURE — 93306 TTE W/DOPPLER COMPLETE: CPT

## 2018-11-21 PROCEDURE — 0 TECHNETIUM SESTAMIBI: Performed by: INTERNAL MEDICINE

## 2018-11-21 PROCEDURE — 93018 CV STRESS TEST I&R ONLY: CPT | Performed by: INTERNAL MEDICINE

## 2018-11-21 PROCEDURE — 78452 HT MUSCLE IMAGE SPECT MULT: CPT | Performed by: INTERNAL MEDICINE

## 2018-11-21 RX ADMIN — TECHNETIUM TC 99M SESTAMIBI 1 DOSE: 1 INJECTION INTRAVENOUS at 10:08

## 2018-11-21 RX ADMIN — TECHNETIUM TC 99M SESTAMIBI 1 DOSE: 1 INJECTION INTRAVENOUS at 10:09

## 2018-11-21 RX ADMIN — REGADENOSON 0.4 MG: 0.08 INJECTION, SOLUTION INTRAVENOUS at 10:08

## 2018-11-26 ENCOUNTER — APPOINTMENT (OUTPATIENT)
Dept: CARDIOLOGY | Facility: HOSPITAL | Age: 83
End: 2018-11-26

## 2018-11-26 LAB
BH CV NUCLEAR PRIOR STUDY: 3
BH CV STRESS BP STAGE 1: NORMAL
BH CV STRESS COMMENTS STAGE 1: NORMAL
BH CV STRESS DOSE REGADENOSON STAGE 1: 0.4
BH CV STRESS DURATION MIN STAGE 1: 0
BH CV STRESS DURATION SEC STAGE 1: 10
BH CV STRESS HR STAGE 1: 74
BH CV STRESS PROTOCOL 1: NORMAL
BH CV STRESS RECOVERY BP: NORMAL MMHG
BH CV STRESS RECOVERY HR: 83 BPM
BH CV STRESS STAGE 1: 1
MAXIMAL PREDICTED HEART RATE: 128 BPM
PERCENT MAX PREDICTED HR: 59.38 %
STRESS BASELINE BP: NORMAL MMHG
STRESS BASELINE HR: 75 BPM
STRESS PERCENT HR: 70 %
STRESS POST PEAK BP: NORMAL MMHG
STRESS POST PEAK HR: 76 BPM
STRESS TARGET HR: 109 BPM

## 2018-11-28 ENCOUNTER — TELEPHONE (OUTPATIENT)
Dept: CARDIOLOGY | Facility: CLINIC | Age: 83
End: 2018-11-28

## 2018-11-29 NOTE — TELEPHONE ENCOUNTER
Patients daughter Pauline was notified of stress test results and to keep follow up as scheduled. -JACIEL;TYLER

## 2018-11-30 LAB
BH CV ECHO MEAS - ACS: 1.9 CM
BH CV ECHO MEAS - AI DEC SLOPE: 288.9 CM/SEC^2
BH CV ECHO MEAS - AI MAX PG: 49.8 MMHG
BH CV ECHO MEAS - AI MAX VEL: 352.9 CM/SEC
BH CV ECHO MEAS - AI P1/2T: 357.8 MSEC
BH CV ECHO MEAS - AO MEAN PG: 2.5 MMHG
BH CV ECHO MEAS - AO ROOT AREA (BSA CORRECTED): 2
BH CV ECHO MEAS - AO ROOT AREA: 10.2 CM^2
BH CV ECHO MEAS - AO ROOT DIAM: 3.6 CM
BH CV ECHO MEAS - AO V2 MEAN: 74.5 CM/SEC
BH CV ECHO MEAS - AO V2 VTI: 23.2 CM
BH CV ECHO MEAS - BSA(HAYCOCK): 1.8 M^2
BH CV ECHO MEAS - BSA: 1.8 M^2
BH CV ECHO MEAS - BZI_BMI: 22.2 KILOGRAMS/M^2
BH CV ECHO MEAS - BZI_METRIC_HEIGHT: 175.3 CM
BH CV ECHO MEAS - BZI_METRIC_WEIGHT: 68 KG
BH CV ECHO MEAS - EDV(CUBED): 80.6 ML
BH CV ECHO MEAS - EDV(MOD-SP4): 66 ML
BH CV ECHO MEAS - EDV(TEICH): 84 ML
BH CV ECHO MEAS - EF(CUBED): 72.7 %
BH CV ECHO MEAS - EF(MOD-SP4): 62.1 %
BH CV ECHO MEAS - EF(TEICH): 64.7 %
BH CV ECHO MEAS - ESV(CUBED): 22 ML
BH CV ECHO MEAS - ESV(MOD-SP4): 25 ML
BH CV ECHO MEAS - ESV(TEICH): 29.6 ML
BH CV ECHO MEAS - FS: 35.1 %
BH CV ECHO MEAS - IVS/LVPW: 0.89
BH CV ECHO MEAS - IVSD: 0.98 CM
BH CV ECHO MEAS - LA DIMENSION: 3.5 CM
BH CV ECHO MEAS - LA/AO: 0.97
BH CV ECHO MEAS - LV DIASTOLIC VOL/BSA (35-75): 36.1 ML/M^2
BH CV ECHO MEAS - LV IVRT: 0.11 SEC
BH CV ECHO MEAS - LV MASS(C)D: 151.7 GRAMS
BH CV ECHO MEAS - LV MASS(C)DI: 83 GRAMS/M^2
BH CV ECHO MEAS - LV SYSTOLIC VOL/BSA (12-30): 13.7 ML/M^2
BH CV ECHO MEAS - LVIDD: 4.3 CM
BH CV ECHO MEAS - LVIDS: 2.8 CM
BH CV ECHO MEAS - LVLD AP4: 6.2 CM
BH CV ECHO MEAS - LVLS AP4: 5.5 CM
BH CV ECHO MEAS - LVOT AREA (M): 4.2 CM^2
BH CV ECHO MEAS - LVOT AREA: 4.1 CM^2
BH CV ECHO MEAS - LVOT DIAM: 2.3 CM
BH CV ECHO MEAS - LVPWD: 1.1 CM
BH CV ECHO MEAS - MV A MAX VEL: 88.4 CM/SEC
BH CV ECHO MEAS - MV DEC SLOPE: 197.7 CM/SEC^2
BH CV ECHO MEAS - MV E MAX VEL: 64.2 CM/SEC
BH CV ECHO MEAS - MV E/A: 0.73
BH CV ECHO MEAS - RAP SYSTOLE: 10 MMHG
BH CV ECHO MEAS - RVDD: 3.2 CM
BH CV ECHO MEAS - RVSP: 33.1 MMHG
BH CV ECHO MEAS - SI(AO): 129.9 ML/M^2
BH CV ECHO MEAS - SI(CUBED): 32.1 ML/M^2
BH CV ECHO MEAS - SI(MOD-SP4): 22.4 ML/M^2
BH CV ECHO MEAS - SI(TEICH): 29.7 ML/M^2
BH CV ECHO MEAS - SV(AO): 237.5 ML
BH CV ECHO MEAS - SV(CUBED): 58.6 ML
BH CV ECHO MEAS - SV(MOD-SP4): 41 ML
BH CV ECHO MEAS - SV(TEICH): 54.4 ML
BH CV ECHO MEAS - TR MAX VEL: 240.3 CM/SEC
MAXIMAL PREDICTED HEART RATE: 128 BPM
STRESS TARGET HR: 109 BPM

## 2018-12-03 RX ORDER — ESCITALOPRAM OXALATE 10 MG/1
TABLET ORAL
Qty: 30 TABLET | Refills: 0 | Status: SHIPPED | OUTPATIENT
Start: 2018-12-03 | End: 2019-01-02 | Stop reason: SDUPTHER

## 2019-01-02 RX ORDER — ESCITALOPRAM OXALATE 10 MG/1
TABLET ORAL
Qty: 30 TABLET | Refills: 0 | Status: SHIPPED | OUTPATIENT
Start: 2019-01-02 | End: 2019-01-29 | Stop reason: SDUPTHER

## 2019-01-29 ENCOUNTER — TELEPHONE (OUTPATIENT)
Dept: CARDIOLOGY | Facility: CLINIC | Age: 84
End: 2019-01-29

## 2019-01-29 RX ORDER — ESCITALOPRAM OXALATE 10 MG/1
TABLET ORAL
Qty: 30 TABLET | Refills: 0 | Status: SHIPPED | OUTPATIENT
Start: 2019-01-29 | End: 2019-05-15 | Stop reason: SDUPTHER

## 2019-01-29 NOTE — TELEPHONE ENCOUNTER
----- Message from Concepcion Forrest sent at 1/28/2019  9:02 AM EST -----   Pt's daughter, Pauline, says they never heard from his echo results, and they would also like to know if he needs to continue taking his Plavix.

## 2019-01-29 NOTE — TELEPHONE ENCOUNTER
Called patients daughterPauline. Notified her of patients echo test results and that patient will need to continue Plavix 75mg daily until advised from our office. Patients daughter verbalized understanding and had no further questions at this time. -JACIEL;TYLER

## 2019-02-08 RX ORDER — ESCITALOPRAM OXALATE 10 MG/1
10 TABLET ORAL DAILY
Qty: 90 TABLET | Refills: 3 | Status: SHIPPED | OUTPATIENT
Start: 2019-02-08

## 2019-04-01 ENCOUNTER — TELEPHONE (OUTPATIENT)
Dept: CARDIOLOGY | Facility: CLINIC | Age: 84
End: 2019-04-01

## 2019-04-01 NOTE — TELEPHONE ENCOUNTER
Seen Dr. Garcia, Dermatologist.  Undergoing precancerous skin tag removal in 2 weeks.  Takes Plavix and Asa.  Does he need to stop these medications?  If so, how long does he need to be off medications pre/post procedure?  Last stent placement July 2018.

## 2019-04-05 NOTE — TELEPHONE ENCOUNTER
Called and spoke with patients daughter, Pauline. Notified her if Dr. Garcia wants cardiac clearance on this patient we will need them to fax request to office. she stated she had spoke with them and they are not wanting requesting patient to HOLD anything at this time. Pauline notified if they do, then contact our office. -;TYLER

## 2019-04-18 ENCOUNTER — TELEPHONE (OUTPATIENT)
Dept: CARDIOLOGY | Facility: CLINIC | Age: 84
End: 2019-04-18

## 2019-04-18 NOTE — TELEPHONE ENCOUNTER
5/15/19 [facility].  PER DR. VALDEZ.  MARISA ZAPATA    [facility].  Pt needs appointment for cardiac clearance, scheduled in May.   MARISA ZAPATA

## 2019-05-15 ENCOUNTER — OFFICE VISIT (OUTPATIENT)
Dept: CARDIOLOGY | Facility: CLINIC | Age: 84
End: 2019-05-15

## 2019-05-15 VITALS
WEIGHT: 160.6 LBS | HEART RATE: 74 BPM | BODY MASS INDEX: 23.79 KG/M2 | OXYGEN SATURATION: 96 % | SYSTOLIC BLOOD PRESSURE: 141 MMHG | DIASTOLIC BLOOD PRESSURE: 66 MMHG | HEIGHT: 69 IN

## 2019-05-15 DIAGNOSIS — R09.89 RALES: ICD-10-CM

## 2019-05-15 DIAGNOSIS — R42 DIZZINESS: ICD-10-CM

## 2019-05-15 DIAGNOSIS — M79.661 PAIN IN BOTH LOWER LEGS: ICD-10-CM

## 2019-05-15 DIAGNOSIS — R05.9 COUGH: ICD-10-CM

## 2019-05-15 DIAGNOSIS — R09.89 RHONCHI: ICD-10-CM

## 2019-05-15 DIAGNOSIS — I10 ESSENTIAL HYPERTENSION: ICD-10-CM

## 2019-05-15 DIAGNOSIS — R06.02 SHORTNESS OF BREATH: ICD-10-CM

## 2019-05-15 DIAGNOSIS — I95.1 ORTHOSTATIC HYPOTENSION: ICD-10-CM

## 2019-05-15 DIAGNOSIS — I25.10 3-VESSEL CAD: Primary | ICD-10-CM

## 2019-05-15 DIAGNOSIS — R53.82 CHRONIC FATIGUE: ICD-10-CM

## 2019-05-15 DIAGNOSIS — R07.2 PRECORDIAL PAIN: ICD-10-CM

## 2019-05-15 DIAGNOSIS — E78.5 DYSLIPIDEMIA: ICD-10-CM

## 2019-05-15 DIAGNOSIS — M79.662 PAIN IN BOTH LOWER LEGS: ICD-10-CM

## 2019-05-15 DIAGNOSIS — I20.9 ANGINA PECTORIS (HCC): ICD-10-CM

## 2019-05-15 PROBLEM — I25.810 CORONARY ARTERY DISEASE INVOLVING CORONARY BYPASS GRAFT OF NATIVE HEART WITHOUT ANGINA PECTORIS: Status: RESOLVED | Noted: 2018-02-20 | Resolved: 2019-05-15

## 2019-05-15 PROCEDURE — 93000 ELECTROCARDIOGRAM COMPLETE: CPT | Performed by: INTERNAL MEDICINE

## 2019-05-15 PROCEDURE — 99214 OFFICE O/P EST MOD 30 MIN: CPT | Performed by: INTERNAL MEDICINE

## 2019-05-15 RX ORDER — NYSTATIN 100000 U/G
CREAM TOPICAL AS NEEDED
COMMUNITY
End: 2022-03-09

## 2019-05-15 RX ORDER — TRIAMCINOLONE ACETONIDE 1 MG/G
CREAM TOPICAL
COMMUNITY
End: 2022-03-09

## 2019-05-15 NOTE — PROGRESS NOTES
Subjective   Steven López is a 89 y.o. male     Chief Complaint   Patient presents with   • Coronary Artery Disease     Here for 6  mo. f/u   • Hypertension   • Shortness of Breath   • Chest Pain   • Hyperlipidemia       PROBLEM LIST:     1. Coronary artery disease with multiple pci's in the past.  1.1 CABG, March 2003 with LIMA to LAD, saphenous vein graft of first and second posterolateral marginal circumflex.  1.2 Repeat CABG, October 2007 with autologous saphenous vein to the LAD and diagonal vessels.  1.3 Follow up stress in August 2015 demonstrating posterolateral ischemia  1.3 History of multiple percutaneous interventions  1.4 [facility] 07/11/18 stenting x2 RCA; Dr. Fish Holder   2. 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 Hypertension  4. Dyslipidemia  5. Dizziness       6. Fatigue               Specialty Problems        Cardiology Problems    3-vessel CAD        Angina pectoris (CMS/[facility])        Hypertension        Hypotension        Coronary artery disease involving coronary bypass graft of native heart without angina pectoris                HPI:  Mr. López returns for follow-up on coronary artery disease, conduction system disease, and cardiac risk factor modification.    He continues to describe chest pain with exertion.  He has a retrosternal fullness that resolves with rest.  Similar symptoms, along with a positive stress test, prompted catheterization last summer which demonstrated 3 graft knobs but no angiographically significant disease in the left coronary artery.  There is high-grade right coronary artery disease which was stented x2.  However, Mr. López had no significant improvement in his symptoms after stenting, and he has had no worsening in symptoms since that time.  He denies orthopnea or PND but describes that he is mildly increased lower extremity edema.  He has mildly dizziness and moderate imbalance but has not  fallen since the time of his last visit.    The patient describes pain with walking in both calves but had a lower extremity duplex study last year that demonstrated no significant atheroma obstructive disease.  Symptoms of TIA or stroke.                  CURRENT MEDICATION:    Current Outpatient Medications   Medication Sig Dispense Refill   • aspirin (ASPIRIN LOW DOSE) 81 MG EC tablet Take 81 mg by mouth Daily.     • CALCIUM PO Take  by mouth. With vit. d     • clopidogrel (PLAVIX) 75 MG tablet Take 1 tablet by mouth Daily. 30 tablet 11   • Coenzyme Q10 (CO Q-10) 100 MG capsule Take 100 mg by mouth 2 (Two) Times a Day.  0   • dorzolamide (TRUSOPT) 2 % ophthalmic solution Administer 1 drop to both eyes Daily.     • finasteride (PROSCAR) 5 MG tablet Take 5 mg by mouth Daily.     • levothyroxine (SYNTHROID, LEVOTHROID) 50 MCG tablet Take 50 mcg by mouth Daily.     • omeprazole (PriLOSEC) 20 MG capsule Take 20 mg by mouth Daily.     • Acetaminophen (TYLENOL ARTHRITIS PAIN PO) Take 650 mg by mouth As Needed.     • ALPRAZolam (XANAX) 0.5 MG tablet Take 0.5 mg by mouth As Needed for Anxiety or Sleep.     • escitalopram (LEXAPRO) 10 MG tablet TAKE ONE TABLET BY MOUTH ONE TIME DAILY  30 tablet 0   • escitalopram (LEXAPRO) 10 MG tablet Take 1 tablet by mouth Daily. 90 tablet 3   • HYDROcodone-acetaminophen (NORCO) 5-325 MG per tablet Take 1 tablet by mouth Every 8 (Eight) Hours As Needed for Mild Pain .     • Ibuprofen 200 MG capsule Take  by mouth 2 (Two) Times a Day.     • latanoprost (XALATAN) 0.005 % ophthalmic solution Apply 125 mcg to eye daily.     • Mirabegron ER (MYRBETRIQ) 25 MG tablet sustained-release 24 hour 24 hr tablet Take 25 mg by mouth Every Other Day.     • nitroglycerin (NITROSTAT) 0.4 MG SL tablet 1 under the tongue as needed for angina, may repeat q5mins for up three doses 100 tablet 11   • Polyethylene Glycol 3350 (MIRALAX PO) Take  by mouth Daily As Needed.       No current facility-administered  medications for this visit.        ALLERGIES:    Penicillins and Sulfa antibiotics    PAST MEDICAL HISTORY:    Past Medical History:   Diagnosis Date   • 3-vessel CAD    • Angina pectoris (CMS/HCC)    • Arthritis    • Dyslipidemia    • Enlarged prostate    • Glaucoma    • Hypertension        SURGICAL HISTORY:    Past Surgical History:   Procedure Laterality Date   • CARDIAC CATHETERIZATION      [facility] 07/2018, stenting to RCA x2   • CORONARY ARTERY BYPASS GRAFT      x2   • CORONARY STENT PLACEMENT     • INSERT / REPLACE / REMOVE PACEMAKER     • PROSTATE SURGERY     • TONSILLECTOMY         SOCIAL HISTORY:    Social History     Socioeconomic History   • Marital status:      Spouse name: Not on file   • Number of children: Not on file   • Years of education: Not on file   • Highest education level: Not on file   Tobacco Use   • Smoking status: Former Smoker   • Smokeless tobacco: Never Used   Substance and Sexual Activity   • Alcohol use: No   • Drug use: No   • Sexual activity: Defer       FAMILY HISTORY:    Family History   Problem Relation Age of Onset   • Stroke Mother    • Stroke Father    • Other Brother         CABG   • COPD Brother         Pulmonary Disease   • Stroke Other        Review of Systems   Constitutional: Positive for fatigue (depends on activity).   HENT: Positive for hearing loss.    Eyes: Positive for visual disturbance (wears glasses prn).   Respiratory: Positive for shortness of breath.    Cardiovascular: Positive for chest pain and leg swelling. Negative for palpitations.   Gastrointestinal: Negative.    Endocrine: Negative.    Genitourinary: Positive for dysuria and frequency.        Enlarged prostate   Musculoskeletal: Positive for arthralgias, gait problem (ambulates with cane) and myalgias.   Skin: Negative.    Allergic/Immunologic: Negative.    Neurological: Positive for dizziness and light-headedness.   Hematological: Bruises/bleeds easily.   Psychiatric/Behavioral: Positive for sleep  "disturbance (up to BR).       VISIT VITALS:  Vitals:    05/15/19 0956   BP: 141/66   BP Location: Left arm   Patient Position: Sitting   Pulse: 74   SpO2: 96%   Weight: 72.8 kg (160 lb 9.6 oz)   Height: 175.3 cm (69.02\")      /66 (BP Location: Left arm, Patient Position: Sitting)   Pulse 74   Ht 175.3 cm (69.02\")   Wt 72.8 kg (160 lb 9.6 oz)   SpO2 96%   BMI 23.71 kg/m²     RECENT LABS:    Objective       Physical Exam   Constitutional: He is oriented to person, place, and time. He appears well-developed and well-nourished. No distress.   HENT:   Head: Normocephalic and atraumatic.   Eyes: Conjunctivae and EOM are normal. Pupils are equal, round, and reactive to light.   Neck: Normal range of motion. Neck supple. No hepatojugular reflux and no JVD present. Carotid bruit is not present. No tracheal deviation present.   Nl. Carotid upstrokes     Cardiovascular: Normal rate, regular rhythm, S1 normal, S2 normal and intact distal pulses.  Extrasystoles (rare) are present. Exam reveals gallop and S4. Exam reveals no S3 and no friction rub.   No murmur heard.  Pulmonary/Chest: Effort normal. He has rhonchi in the right lower field. He has rales (bi-basilar).   Abdominal: Soft. Bowel sounds are normal. He exhibits no distension, no abdominal bruit and no mass. There is no tenderness. There is no rebound and no guarding.   No organomegaly   Musculoskeletal: Normal range of motion. He exhibits edema. He exhibits no tenderness or deformity.   LLE, 1+ edema, palpable DP pedal pulses, mild ve, stasis changes  RLE, mild venous stasis changes, palpable pedal pulses, 1-2+ edema   Neurological: He is alert and oriented to person, place, and time.   Skin: Skin is warm and dry. No rash noted. No erythema. No pallor.   Psychiatric: He has a normal mood and affect. His behavior is normal. Judgment and thought content normal.   Nursing note and vitals reviewed.        ECG 12 Lead  Date/Time: 5/15/2019 10:11 AM  Performed by: " Fish Holder MD  Authorized by: Fish Holder MD   Comments: Dual-chamber pacing with 100% atrial and ventricular capture and rare PVCs.              Assessment/Plan   #1.  Coronary artery disease status post coronary artery bypass grafting with recent catheterization demonstrating no patent grafts, but no high-grade left coronary disease and high-grade right coronary disease which was stented.  The patient had no improvement in symptoms and has had persistent chest pain since that time.  Given the fact that there was no residual high-grade disease, and that the patient did not benefit from stenting the only angiographically significant lesion, I do not feel that further evaluation is indicated at this time.  Additionally, with the anatomy and physiology described above the patient is at low risk for TURP.  He can stop Plavix 5 days prior to the procedure, and will not need to restart that medication post.    2.  Systemic hypertension.  Blood pressures are marginally controlled but I do not think an warrant medication change at this time.    3.  Conduction system disease status post dual-chamber pacemaker implantation.  Patient has no symptoms to suggest cerebral hypoperfusion or pacemaker malfunction, and previous interrogations have been unremarkable.  We will continue as current.    4.  Chronic imbalance.  I think this relates both of peripheral neuropathy as well as muscle weakness in this 89-year-old patient.  I do not believe there is a significant cardiac etiology.    5.  Lower extremity claudication.  The patient has normal pulses today and an unremarkable duplex study last year.  I wonder if symptoms are related to the patient's known peripheral neuropathy.  We will not pursue further evaluation at this time.    6.  No lung findings on physical exam today.  Echocardiogram in November of last year demonstrated normal LV function grade 1A- 2 diastolic dysfunction and no significant pulmonary  hypertension.  I would like to have the patient obtain a chest x-ray to exclude pulmonary congestion to look for noncardiac etiology of abnormal findings as described above.  There is evidence of cardiovascular pathology we will follow with the patient.  Otherwise, I have asked Mr. Aguayo to follow-up on any abnormal results with Dr. Bishop.    7.  Mr. López will follow with Dr. Bishop as instructed, and in our office in 1 year or on a as needed basis for symptoms as discussed in detail today.   Diagnosis Plan   1. 3-vessel CAD     2. Angina pectoris (CMS/HCC)     3. Essential hypertension     4. Orthostatic hypotension     5. Shortness of breath     6. Pain in both lower legs     7. Chronic fatigue     8. Dizziness     9. Dyslipidemia         No Follow-up on file.              Patient's Body mass index is 23.71 kg/m². BMI is within normal parameters. No follow-up required..       Bri Duke LPN    Scribed for Dr. Fish Holder by Bri Duke LPN May 15, 2019 10:05 AM         Electronically signed by:            This note is dictated utilizing voice recognition software.  Although this record has been proof read, transcriptional errors may still be present. If questions occur regarding the content of this record please do not hesitate to call our office.

## 2019-05-16 ENCOUNTER — DOCUMENTATION (OUTPATIENT)
Dept: CARDIOLOGY | Facility: CLINIC | Age: 84
End: 2019-05-16

## 2019-05-16 NOTE — PROGRESS NOTES
CXR FROM YEST. WITH NO ACUTE PULMONARY OR PLEURAL DISEASE, LUNGS CLEAR. DAUGHTER, MAGDALENA, AWARE, AND THAT RESULTS WILL BE FAXED TO PCP. NORMAN,LAURENCEN

## 2019-05-17 ENCOUNTER — OFFICE VISIT (OUTPATIENT)
Dept: CARDIOLOGY | Facility: CLINIC | Age: 84
End: 2019-05-17

## 2019-05-17 DIAGNOSIS — I45.9 CARDIAC CONDUCTION DISORDER: Primary | ICD-10-CM

## 2019-05-17 PROCEDURE — 93288 INTERROG EVL PM/LDLS PM IP: CPT | Performed by: INTERNAL MEDICINE

## 2019-05-20 ENCOUNTER — DOCUMENTATION (OUTPATIENT)
Dept: CARDIOLOGY | Facility: CLINIC | Age: 84
End: 2019-05-20

## 2019-05-31 ENCOUNTER — TELEPHONE (OUTPATIENT)
Dept: CARDIOLOGY | Facility: CLINIC | Age: 84
End: 2019-05-31

## 2019-06-10 ENCOUNTER — DOCUMENTATION (OUTPATIENT)
Dept: CARDIOLOGY | Facility: CLINIC | Age: 84
End: 2019-06-10

## 2019-06-10 NOTE — PROGRESS NOTES
[facility] FAXED REQUEST FOR CLEARANCE TO USE MAGNET IF NEEDED FOR UPCOMING SURGERY. CLEARANCE LETTER SIGNED AND GIVEN TO SCOTTY STAFFORD TO FAX BACK TO THEM. PH,RENATE

## 2019-11-04 ENCOUNTER — TELEPHONE (OUTPATIENT)
Dept: CARDIOLOGY | Facility: CLINIC | Age: 84
End: 2019-11-04

## 2019-11-04 NOTE — TELEPHONE ENCOUNTER
Daughter, Tariq Carpenter, had called inquiring about ASA for x 1 dental extraction with dr. Franco. Called and spoke with Johanna at his office at [phone], she discussed it with him and he is not worried about the ASA, can stay on it. Called daughter  fran damian let her know. PH,LPN            ----- Message from Carolin Koehler sent at 11/4/2019  8:16 AM EST -----  Regarding: can pt. stay on aspirin  Contact: [phone]  Patient will have a tooth pulled on Wednesday can He stay on aspirin or does he need to stop before the extraction, please advise.

## 2019-11-15 ENCOUNTER — OFFICE VISIT (OUTPATIENT)
Dept: CARDIOLOGY | Facility: CLINIC | Age: 84
End: 2019-11-15

## 2019-11-15 DIAGNOSIS — I45.9 CONDUCTION DISORDER OF THE HEART: Primary | ICD-10-CM

## 2019-11-15 PROCEDURE — 93288 INTERROG EVL PM/LDLS PM IP: CPT | Performed by: INTERNAL MEDICINE

## 2019-11-29 DIAGNOSIS — R94.39 ABNORMAL STRESS TEST: ICD-10-CM

## 2019-11-29 DIAGNOSIS — I25.118 CORONARY ARTERY DISEASE OF NATIVE ARTERY OF NATIVE HEART WITH STABLE ANGINA PECTORIS (HCC): ICD-10-CM

## 2019-11-29 DIAGNOSIS — R07.2 PRECORDIAL PAIN: ICD-10-CM

## 2019-11-29 DIAGNOSIS — I10 ESSENTIAL HYPERTENSION: ICD-10-CM

## 2019-11-29 DIAGNOSIS — R06.02 SOB (SHORTNESS OF BREATH): ICD-10-CM

## 2019-11-29 RX ORDER — NITROGLYCERIN 0.4 MG/1
TABLET SUBLINGUAL
Qty: 100 TABLET | Refills: 10 | Status: SHIPPED | OUTPATIENT
Start: 2019-11-29 | End: 2021-01-13

## 2020-05-11 ENCOUNTER — TELEPHONE (OUTPATIENT)
Dept: CARDIOLOGY | Facility: CLINIC | Age: 85
End: 2020-05-11

## 2020-05-11 NOTE — TELEPHONE ENCOUNTER
Clearance request reviewed by Steven Escobedo and was found to be at acceptable risk. Needs to continue ASA. Letter faxed to Dr. Tom office.  , ANNAA

## 2020-06-19 ENCOUNTER — OFFICE VISIT (OUTPATIENT)
Dept: CARDIOLOGY | Facility: CLINIC | Age: 85
End: 2020-06-19

## 2020-06-19 DIAGNOSIS — I45.9 CARDIAC CONDUCTION DISORDER: Primary | ICD-10-CM

## 2020-06-19 PROCEDURE — 93288 INTERROG EVL PM/LDLS PM IP: CPT | Performed by: INTERNAL MEDICINE

## 2020-06-24 ENCOUNTER — OFFICE VISIT (OUTPATIENT)
Dept: CARDIOLOGY | Facility: CLINIC | Age: 85
End: 2020-06-24

## 2020-06-24 VITALS
HEART RATE: 78 BPM | DIASTOLIC BLOOD PRESSURE: 64 MMHG | WEIGHT: 158 LBS | OXYGEN SATURATION: 98 % | HEIGHT: 69 IN | SYSTOLIC BLOOD PRESSURE: 133 MMHG | BODY MASS INDEX: 23.4 KG/M2

## 2020-06-24 DIAGNOSIS — E78.5 DYSLIPIDEMIA: ICD-10-CM

## 2020-06-24 DIAGNOSIS — R06.02 SHORTNESS OF BREATH: ICD-10-CM

## 2020-06-24 DIAGNOSIS — I20.9 ANGINA PECTORIS (HCC): ICD-10-CM

## 2020-06-24 DIAGNOSIS — I25.10 3-VESSEL CAD: Primary | ICD-10-CM

## 2020-06-24 DIAGNOSIS — I95.1 ORTHOSTATIC HYPOTENSION: ICD-10-CM

## 2020-06-24 DIAGNOSIS — R07.2 PRECORDIAL PAIN: ICD-10-CM

## 2020-06-24 DIAGNOSIS — I10 ESSENTIAL HYPERTENSION: ICD-10-CM

## 2020-06-24 DIAGNOSIS — R53.82 CHRONIC FATIGUE: ICD-10-CM

## 2020-06-24 DIAGNOSIS — R42 DIZZINESS: ICD-10-CM

## 2020-06-24 PROCEDURE — 93000 ELECTROCARDIOGRAM COMPLETE: CPT | Performed by: INTERNAL MEDICINE

## 2020-06-24 PROCEDURE — 99214 OFFICE O/P EST MOD 30 MIN: CPT | Performed by: INTERNAL MEDICINE

## 2020-06-24 RX ORDER — GABAPENTIN 100 MG/1
CAPSULE ORAL NIGHTLY
COMMUNITY
Start: 2020-06-03 | End: 2020-12-15

## 2020-06-24 NOTE — PROGRESS NOTES
Subjective   Steven López is a 89 y.o. male     Chief Complaint   Patient presents with   • Coronary Artery Disease     Here for yearly f/u   • Hypertension   • Hyperlipidemia       PROBLEM LIST:     1. Coronary artery disease with multiple pci's in the past.  1.1 CABG, March 2003 with LIMA to LAD, saphenous vein graft of first and second posterolateral marginal circumflex.  1.2 Repeat CABG, October 2007 with autologous saphenous vein to the LAD and diagonal vessels.  1.3 Follow up stress in August 2015 demonstrating posterolateral ischemia  1.3 History of multiple percutaneous interventions  1.4 [facility] 07/11/18 stenting x2 RCA; Dr. Fish Holder   2. 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 Hypertension  4. Dyslipidemia  5. Dizziness       6. Fatigue         Specialty Problems        Cardiology Problems    3-vessel CAD        Angina pectoris (CMS/HCC)        Hypertension        Hypotension                HPI:  Mr. López returns for follow-up on the above.    He continues to complain of chest discomfort and dyspnea with physical activity.  The same symptoms prompted cardiac catheterization and stenting of the right coronary artery in 2018.  The patient had occluded grafts but no high-grade left coronary disease.  Mr. López had no improvement in either chest pain or dyspnea after that procedure.  Symptoms have mildly increased over the last year.  However, Mr. Aguayo continues to be without orthopnea, PND, or change in very minimal lower extremity edema.  He describes some mild positional and orthostatic lightheadedness as well as moderate to severe imbalance.  Imbalance was felt previously to be due to severe lower extremity neuropathy as well as Sarco bernal and progressive weakness.    Mr. López was placed on very low-dose gabapentin which may have worsened tremor and imbalance.  We discussed stopping that medication.  The patient describes no  symptoms of claudication and he has had no symptoms of TIA or stroke.  Blood pressures have been well controlled.  We have previously discussed statin use in this patient with known coronary artery disease.  Given the patient's age, the absence of critical disease in the left coronary artery, mechanically revascularized right coronary artery, and prior adverse reactions with statin we elected not to continue statin therapy.  We reviewed all these issues again and the patient states that he again does not want to repeat a challenge of statin therapy.                      PRIOR MEDICATIONS    Current Outpatient Medications on File Prior to Visit   Medication Sig Dispense Refill   • Acetaminophen (TYLENOL ARTHRITIS PAIN PO) Take 650 mg by mouth As Needed.     • ALPRAZolam (XANAX) 0.5 MG tablet Take 0.5 mg by mouth As Needed for Anxiety or Sleep.     • aspirin (ASPIRIN LOW DOSE) 81 MG EC tablet Take 81 mg by mouth Daily.     • CALCIUM PO Take  by mouth. With vit. d     • Coenzyme Q10 (CO Q-10) 100 MG capsule Take 100 mg by mouth 2 (Two) Times a Day.  0   • dorzolamide (TRUSOPT) 2 % ophthalmic solution Administer 1 drop to both eyes Daily.     • escitalopram (LEXAPRO) 10 MG tablet Take 1 tablet by mouth Daily. 90 tablet 3   • finasteride (PROSCAR) 5 MG tablet Take 5 mg by mouth Daily.     • gabapentin (NEURONTIN) 100 MG capsule Every Night.     • HYDROcodone-acetaminophen (NORCO) 5-325 MG per tablet Take 1 tablet by mouth Every 8 (Eight) Hours As Needed for Mild Pain .     • latanoprost (XALATAN) 0.005 % ophthalmic solution Apply 125 mcg to eye daily.     • levothyroxine (SYNTHROID, LEVOTHROID) 50 MCG tablet Take 50 mcg by mouth Daily.     • Multiple Vitamins-Minerals (MULTIVITAMIN ADULTS PO) Take  by mouth Daily.     • nystatin (MYCOSTATIN) 588523 UNIT/GM cream Apply  topically to the appropriate area as directed As Needed.     • omeprazole (PriLOSEC) 20 MG capsule Take 20 mg by mouth Daily.     • Polyethylene Glycol  3350 (MIRALAX PO) Take  by mouth Daily As Needed.     • triamcinolone (KENALOG) 0.1 % cream Apply  topically to the appropriate area as directed. prn     • nitroglycerin (NITROSTAT) 0.4 MG SL tablet PLACE 1 TAB UNDER TONGUE EVERY 5 MINUTES-UP TO 3 DOSES-AS NEEDED FOR CHEST PAIN 100 tablet 10     No current facility-administered medications on file prior to visit.        ALLERGIES:    Penicillins and Sulfa antibiotics    PAST MEDICAL HISTORY:    Past Medical History:   Diagnosis Date   • 3-vessel CAD    • Angina pectoris (CMS/HCC)    • Arthritis    • Dyslipidemia    • Enlarged prostate    • Glaucoma    • Hypertension        SURGICAL HISTORY:    Past Surgical History:   Procedure Laterality Date   • CARDIAC CATHETERIZATION      [facility] 07/2018, stenting to RCA x2   • CORONARY ARTERY BYPASS GRAFT      x2   • CORONARY STENT PLACEMENT     • INSERT / REPLACE / REMOVE PACEMAKER     • PROSTATE SURGERY     • TONSILLECTOMY         SOCIAL HISTORY:    Social History     Socioeconomic History   • Marital status:      Spouse name: Not on file   • Number of children: Not on file   • Years of education: Not on file   • Highest education level: Not on file   Tobacco Use   • Smoking status: Former Smoker   • Smokeless tobacco: Never Used   Substance and Sexual Activity   • Alcohol use: No   • Drug use: No   • Sexual activity: Defer       FAMILY HISTORY:    Family History   Problem Relation Age of Onset   • Stroke Mother    • Stroke Father    • Other Brother         CABG   • COPD Brother         Pulmonary Disease   • Stroke Other        Review of Systems   Constitutional: Positive for fatigue.   HENT: Positive for hearing loss.    Eyes: Positive for visual disturbance (glasses prn).   Respiratory: Positive for shortness of breath (worsening).         Denies orthopnea/PND   Cardiovascular: Positive for chest pain (pressure with exertion) and leg swelling (sock line). Negative for palpitations.   Gastrointestinal: Positive for  "constipation (miralax PRN).   Endocrine: Negative.    Genitourinary: Negative.    Musculoskeletal: Positive for arthralgias, gait problem (ambulates with cane) and myalgias.   Skin: Negative.    Allergic/Immunologic: Positive for environmental allergies.   Neurological: Positive for dizziness (worsening) and numbness (feet). Negative for syncope.   Hematological: Bruises/bleeds easily.   Psychiatric/Behavioral: Positive for sleep disturbance (feet/legs burning and hurting).       VISIT VITALS:  Vitals:    06/24/20 1037   BP: 133/64   BP Location: Left arm   Patient Position: Sitting   Pulse: 78   SpO2: 98%   Weight: 71.7 kg (158 lb)   Height: 175.3 cm (69\")      /64 (BP Location: Left arm, Patient Position: Sitting)   Pulse 78   Ht 175.3 cm (69\")   Wt 71.7 kg (158 lb)   SpO2 98%   BMI 23.33 kg/m²     RECENT LABS:    Objective       Physical Exam   Constitutional: He is oriented to person, place, and time. He appears well-developed and well-nourished. No distress.   HENT:   Head: Normocephalic and atraumatic.   Eyes: Pupils are equal, round, and reactive to light. Conjunctivae and EOM are normal.   Neck: Normal range of motion. Neck supple. No hepatojugular reflux and no JVD present. Carotid bruit is not present. No tracheal deviation present.   Nl. Carotid upstrokes   Cardiovascular: Normal rate, regular rhythm, S1 normal, S2 normal and intact distal pulses. Exam reveals gallop and S4. Exam reveals no S3 and no friction rub.   No murmur heard.  Pulmonary/Chest: Effort normal and breath sounds normal. He has no wheezes. He has no rhonchi. He has no rales.   Nl. Expir. Phase  Nl. Breath sound intensity   Abdominal: Soft. Bowel sounds are normal. He exhibits no distension, no abdominal bruit and no mass. There is no tenderness. There is no rebound and no guarding.   No organomegaly   Musculoskeletal: Normal range of motion. He exhibits edema. He exhibits no tenderness or deformity.   LLE, trace edema, mild " atrophic changes  RLE, no edema, palpable PT pedal pulse, mild brianne. Stasis changes   Neurological: He is alert and oriented to person, place, and time.   Skin: Skin is warm and dry. No rash noted. No erythema. No pallor.   Psychiatric: He has a normal mood and affect. His behavior is normal. Judgment and thought content normal.   Nursing note and vitals reviewed.        ECG 12 Lead  Date/Time: 6/24/2020 11:32 AM  Performed by: Fish Holder MD  Authorized by: Fish Holder MD   Comparison: compared with previous ECG from 5/15/2019  Similar to previous ECG  Comments: Dual-chamber pacing with 100% ventricular capture.              Assessment/Plan   #1.  Coronary artery disease.  See discussion above.  As Mr. López had no benefit for stenting of the right coronary artery and had no residual high-grade disease I do not think that further wrist ratification from the standpoint of ischemia is appropriate despite the patient's persistent exertional dyspnea.  He did not tolerate Ranexa previously therefore, treatment for microvascular angina or endothelial dysfunction will not be pursued.    2.  Imbalance.  I continue to believe this is multifactorial in etiology with neuropathy, cytopenia, and perhaps a central component all is contributing factors.  I discussed with the patient and his son stop being gabapentin.  If the patient has no worsening of neuropathic symptoms I do not feel that that medication would need to be restarted.  Additionally, Mr. López is taking benzodiazepine and hydrocodone which may be contributing to both dizziness and imbalance.  I did ask that they discuss these issues with Dr. Bishop with regard to the need for therapeutic changes.    3.  Treated and controlled systemic hypertension.    4.  Dyslipidemia.  See discussion under history of present illness above.    4.  I asked Mr. Aguayo to follow-up with Dr. Bishop for her instructions,  and we will plan on seeing him in follow-up in 6  months or on a as needed basis.   Diagnosis Plan   1. 3-vessel CAD     2. Essential hypertension     3. Angina pectoris (CMS/HCC)     4. Orthostatic hypotension     5. Dyslipidemia     6. Shortness of breath     7. Chronic fatigue     8. Dizziness         No follow-ups on file.         Steven López  reports that he has quit smoking. He has never used smokeless tobacco.. I have educated him on the risk of diseases from using tobacco products such as cancer, COPD and heart diease.               Patient's Body mass index is 23.33 kg/m². BMI is within normal parameters. No follow-up required..       Bri Duke LPN    Scribed for Dr. Fish Holder by Bri Duke LPN June 24, 2020 10:50         Electronically signed by:            This note is dictated utilizing voice recognition software.  Although this record has been proof read, transcriptional errors may still be present. If questions occur regarding the content of this record please do not hesitate to call our office.

## 2020-12-15 ENCOUNTER — OFFICE VISIT (OUTPATIENT)
Dept: CARDIOLOGY | Facility: CLINIC | Age: 85
End: 2020-12-15

## 2020-12-15 VITALS
SYSTOLIC BLOOD PRESSURE: 158 MMHG | WEIGHT: 160 LBS | HEIGHT: 69 IN | OXYGEN SATURATION: 95 % | BODY MASS INDEX: 23.7 KG/M2 | DIASTOLIC BLOOD PRESSURE: 79 MMHG | HEART RATE: 70 BPM

## 2020-12-15 DIAGNOSIS — I10 ESSENTIAL HYPERTENSION: ICD-10-CM

## 2020-12-15 DIAGNOSIS — R06.02 SHORTNESS OF BREATH: Primary | ICD-10-CM

## 2020-12-15 DIAGNOSIS — R07.2 PRECORDIAL PAIN: ICD-10-CM

## 2020-12-15 DIAGNOSIS — E78.5 DYSLIPIDEMIA: ICD-10-CM

## 2020-12-15 PROCEDURE — 99213 OFFICE O/P EST LOW 20 MIN: CPT | Performed by: PHYSICIAN ASSISTANT

## 2020-12-15 PROCEDURE — 93000 ELECTROCARDIOGRAM COMPLETE: CPT | Performed by: PHYSICIAN ASSISTANT

## 2020-12-15 NOTE — PROGRESS NOTES
Problem list     Subjective   Steven López is a 89 y.o. male     Chief Complaint   Patient presents with   • Chest Pain     Here for eval.   • Shortness of Breath   PROBLEM LIST:      1. Coronary artery disease with multiple pci's in the past.  1.1 CABG, March 2003 with LIMA to LAD, saphenous vein graft of first and second posterolateral marginal circumflex.  1.2 Repeat CABG, October 2007 with autologous saphenous vein to the LAD and diagonal vessels.  1.3 Follow up stress in August 2015 demonstrating posterolateral ischemia  1.3 History of multiple percutaneous interventions  1.4 [facility] 07/11/18 stenting x2 RCA; Dr. Fish Holder   2. 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 Hypertension  4. Dyslipidemia  5. Dizziness       6. Fatigue     HPI    The patient presents back to the clinic today at his request.  Recently, the patient started noticing episodes of chest discomfort.  He initially felt that this was related more to anxiety.  Yesterday, the patient reports that while exerting, in particular while outside walking, he had onset of chest discomfort.  He had chest tightness and a smothering sensation.  He had no associated neck, arm, or jaw discomfort.  This persisted on and off throughout the day.  He would take nitro which would minimize his degree of chest discomfort and smothering to a degree.  This morning, the patient woke up and took a Xanax and reports that his symptoms have improved still further.  He has had no failure or dysrhythmic symptoms.  With onset of discomfort, he has requested that we see him today.                Current Outpatient Medications on File Prior to Visit   Medication Sig Dispense Refill   • Acetaminophen (TYLENOL ARTHRITIS PAIN PO) Take 650 mg by mouth As Needed.     • ALPRAZolam (XANAX) 0.5 MG tablet Take 0.5 mg by mouth As Needed for Anxiety or Sleep.     • aspirin (ASPIRIN LOW DOSE) 81 MG EC tablet Take 81  mg by mouth Daily.     • CALCIUM PO Take  by mouth. With vit. d     • Coenzyme Q10 (CO Q-10) 100 MG capsule Take 100 mg by mouth 2 (Two) Times a Day.  0   • dorzolamide (TRUSOPT) 2 % ophthalmic solution Administer 1 drop to both eyes Daily.     • escitalopram (LEXAPRO) 10 MG tablet Take 1 tablet by mouth Daily. 90 tablet 3   • finasteride (PROSCAR) 5 MG tablet Take 5 mg by mouth Daily.     • HYDROcodone-acetaminophen (NORCO) 5-325 MG per tablet Take 1 tablet by mouth Every 8 (Eight) Hours As Needed for Mild Pain .     • latanoprost (XALATAN) 0.005 % ophthalmic solution Apply 125 mcg to eye daily.     • levothyroxine (SYNTHROID, LEVOTHROID) 50 MCG tablet Take 50 mcg by mouth Daily.     • Multiple Vitamins-Minerals (MULTIVITAMIN ADULTS PO) Take  by mouth Daily.     • nitroglycerin (NITROSTAT) 0.4 MG SL tablet PLACE 1 TAB UNDER TONGUE EVERY 5 MINUTES-UP TO 3 DOSES-AS NEEDED FOR CHEST PAIN 100 tablet 10   • nystatin (MYCOSTATIN) 125696 UNIT/GM cream Apply  topically to the appropriate area as directed As Needed.     • omeprazole (PriLOSEC) 20 MG capsule Take 20 mg by mouth Daily.     • Polyethylene Glycol 3350 (MIRALAX PO) Take  by mouth Daily As Needed.     • triamcinolone (KENALOG) 0.1 % cream Apply  topically to the appropriate area as directed. prn     • [DISCONTINUED] gabapentin (NEURONTIN) 100 MG capsule Every Night.       No current facility-administered medications on file prior to visit.        Penicillins and Sulfa antibiotics    Past Medical History:   Diagnosis Date   • 3-vessel CAD    • Angina pectoris (CMS/HCC)    • Arthritis    • Dyslipidemia    • Enlarged prostate    • Glaucoma    • Hypertension        Social History     Socioeconomic History   • Marital status:      Spouse name: Not on file   • Number of children: Not on file   • Years of education: Not on file   • Highest education level: Not on file   Tobacco Use   • Smoking status: Former Smoker   • Smokeless tobacco: Never Used   Substance  "and Sexual Activity   • Alcohol use: No   • Drug use: No   • Sexual activity: Defer       Family History   Problem Relation Age of Onset   • Stroke Mother    • Stroke Father    • Other Brother         CABG   • COPD Brother         Pulmonary Disease   • Stroke Other        Review of Systems   Constitutional: Positive for fatigue.   HENT: Positive for hearing loss.    Eyes: Positive for visual disturbance (glasses prn).   Respiratory: Positive for shortness of breath.    Cardiovascular: Positive for chest pain. Negative for palpitations and leg swelling.   Gastrointestinal: Positive for constipation.   Endocrine: Negative.    Genitourinary: Negative.    Musculoskeletal: Positive for arthralgias, gait problem (ambulates with cane) and myalgias.   Skin: Negative.    Allergic/Immunologic: Negative.    Neurological: Positive for dizziness.   Hematological: Bruises/bleeds easily.   Psychiatric/Behavioral: Negative.        Objective   Vitals:    12/15/20 1259   BP: 158/79   BP Location: Left arm   Patient Position: Sitting   Pulse: 70   SpO2: 95%   Weight: 72.6 kg (160 lb)   Height: 175.3 cm (69.02\")      /79 (BP Location: Left arm, Patient Position: Sitting)   Pulse 70   Ht 175.3 cm (69.02\")   Wt 72.6 kg (160 lb)   SpO2 95%   BMI 23.62 kg/m²    Lab Results (most recent)     None        Physical Exam  Vitals signs and nursing note reviewed.   Constitutional:       General: He is not in acute distress.     Appearance: He is well-developed.   HENT:      Head: Normocephalic and atraumatic.   Eyes:      Conjunctiva/sclera: Conjunctivae normal.      Pupils: Pupils are equal, round, and reactive to light.   Neck:      Musculoskeletal: Normal range of motion and neck supple.      Vascular: No JVD.      Trachea: No tracheal deviation.   Cardiovascular:      Rate and Rhythm: Normal rate and regular rhythm.      Heart sounds: Normal heart sounds.   Pulmonary:      Effort: Pulmonary effort is normal.      Breath sounds: " Normal breath sounds.   Abdominal:      General: Bowel sounds are normal. There is no distension.      Palpations: Abdomen is soft. There is no mass.      Tenderness: There is no abdominal tenderness. There is no guarding or rebound.   Musculoskeletal: Normal range of motion.         General: No tenderness or deformity.   Skin:     General: Skin is warm and dry.      Coloration: Skin is not pale.      Findings: No erythema or rash.   Neurological:      Mental Status: He is alert and oriented to person, place, and time.   Psychiatric:         Behavior: Behavior normal.         Thought Content: Thought content normal.         Judgment: Judgment normal.           Procedure     ECG 12 Lead    Date/Time: 12/15/2020 1:10 PM  Performed by: Steven Escobedo PA  Authorized by: Steven Escobedo PA   Comparison: not compared with previous ECG   Comments: AV pacing without acute changes noted.               Assessment/Plan      Diagnosis Plan   1. Shortness of breath  ECG 12 Lead    Stress Test With Myocardial Perfusion    Adult Transthoracic Echo Complete W/ Cont if Necessary Per Protocol    CBC & Differential    Comprehensive Metabolic Panel    XR Chest PA & Lateral   2. Precordial pain  ECG 12 Lead    Stress Test With Myocardial Perfusion    Adult Transthoracic Echo Complete W/ Cont if Necessary Per Protocol    CBC & Differential    Comprehensive Metabolic Panel    XR Chest PA & Lateral   3. Essential hypertension  Stress Test With Myocardial Perfusion    Adult Transthoracic Echo Complete W/ Cont if Necessary Per Protocol    CBC & Differential    Comprehensive Metabolic Panel   4. Dyslipidemia  Stress Test With Myocardial Perfusion    Adult Transthoracic Echo Complete W/ Cont if Necessary Per Protocol    CBC & Differential    Comprehensive Metabolic Panel    Lipid Panel   1.  The patient has had recurrent episodes of chest discomfort, progressive dyspnea, and symptoms otherwise as outlined above.  He will need further  evaluation for ischemia for risk stratification.  We will schedule for chemical nuclear stress test.  He is not a candidate for treadmill protocol.    2.  We will schedule for an echo as well to evaluate LV size, LV function, valvular morphologies, etc.    3.  Because of symptoms and clinical scenario otherwise as above, I would schedule for routine laboratories at this time.  He will be scheduled for laboratories as above.    4.  We will also schedule for chest x-ray given his degree of smothering as of recent.    5.  I would continue medical regimen without change.  We will see him back after the above and recommend him further at that time.    6.  Of note, recent pacer interrogation indicates stable function.  He is scheduled for that again in January.  We will follow that routinely through the clinic.  We will see him back to review results of all the above.  He will call immediately for ongoing or progressive issues otherwise.             Steven López  reports that he has quit smoking. He has never used smokeless tobacco.. I have educated him on the risk of diseases from using tobacco products such as cancer, COPD and heart disease.       Advance Care Planning   ACP discussion was held with the patient during this visit. Patient has an advance directive (not in EMR), copy requested.states has a living will, copy requested.         Patient's Body mass index is 23.62 kg/m². BMI is within normal parameters. No follow-up required..             Electronically signed by:

## 2021-01-13 DIAGNOSIS — R06.02 SOB (SHORTNESS OF BREATH): ICD-10-CM

## 2021-01-13 DIAGNOSIS — R94.39 ABNORMAL STRESS TEST: ICD-10-CM

## 2021-01-13 DIAGNOSIS — I25.118 CORONARY ARTERY DISEASE OF NATIVE ARTERY OF NATIVE HEART WITH STABLE ANGINA PECTORIS (HCC): ICD-10-CM

## 2021-01-13 DIAGNOSIS — I10 ESSENTIAL HYPERTENSION: ICD-10-CM

## 2021-01-13 DIAGNOSIS — R07.2 PRECORDIAL PAIN: ICD-10-CM

## 2021-01-13 RX ORDER — NITROGLYCERIN 0.4 MG/1
TABLET SUBLINGUAL
Qty: 100 TABLET | Refills: 0 | Status: SHIPPED | OUTPATIENT
Start: 2021-01-13 | End: 2021-01-15

## 2021-01-15 ENCOUNTER — OFFICE VISIT (OUTPATIENT)
Dept: CARDIOLOGY | Facility: CLINIC | Age: 86
End: 2021-01-15

## 2021-01-15 DIAGNOSIS — I10 ESSENTIAL HYPERTENSION: ICD-10-CM

## 2021-01-15 DIAGNOSIS — I25.118 CORONARY ARTERY DISEASE OF NATIVE ARTERY OF NATIVE HEART WITH STABLE ANGINA PECTORIS (HCC): ICD-10-CM

## 2021-01-15 DIAGNOSIS — R06.02 SOB (SHORTNESS OF BREATH): ICD-10-CM

## 2021-01-15 DIAGNOSIS — R94.39 ABNORMAL STRESS TEST: ICD-10-CM

## 2021-01-15 DIAGNOSIS — I25.10 3-VESSEL CAD: ICD-10-CM

## 2021-01-15 DIAGNOSIS — R06.02 SHORTNESS OF BREATH: ICD-10-CM

## 2021-01-15 DIAGNOSIS — R07.2 PRECORDIAL PAIN: ICD-10-CM

## 2021-01-15 PROCEDURE — 93288 INTERROG EVL PM/LDLS PM IP: CPT | Performed by: INTERNAL MEDICINE

## 2021-01-15 RX ORDER — NITROGLYCERIN 0.4 MG/1
TABLET SUBLINGUAL
Qty: 25 TABLET | Refills: 3 | Status: SHIPPED | OUTPATIENT
Start: 2021-01-15 | End: 2022-05-23

## 2021-01-29 ENCOUNTER — HOSPITAL ENCOUNTER (OUTPATIENT)
Dept: CARDIOLOGY | Facility: HOSPITAL | Age: 86
Discharge: HOME | End: 2021-01-29

## 2021-01-29 VITALS — BODY MASS INDEX: 23.71 KG/M2 | WEIGHT: 160.05 LBS | HEIGHT: 69 IN

## 2021-01-29 DIAGNOSIS — I10 ESSENTIAL HYPERTENSION: ICD-10-CM

## 2021-01-29 DIAGNOSIS — R07.2 PRECORDIAL PAIN: ICD-10-CM

## 2021-01-29 DIAGNOSIS — E78.5 DYSLIPIDEMIA: ICD-10-CM

## 2021-01-29 DIAGNOSIS — R06.02 SHORTNESS OF BREATH: ICD-10-CM

## 2021-01-29 PROCEDURE — 93306 TTE W/DOPPLER COMPLETE: CPT | Performed by: INTERNAL MEDICINE

## 2021-01-29 PROCEDURE — 0 TECHNETIUM SESTAMIBI: Performed by: INTERNAL MEDICINE

## 2021-01-29 PROCEDURE — 78452 HT MUSCLE IMAGE SPECT MULT: CPT

## 2021-01-29 PROCEDURE — 25010000002 REGADENOSON 0.4 MG/5ML SOLUTION: Performed by: INTERNAL MEDICINE

## 2021-01-29 PROCEDURE — 78452 HT MUSCLE IMAGE SPECT MULT: CPT | Performed by: INTERNAL MEDICINE

## 2021-01-29 PROCEDURE — 93017 CV STRESS TEST TRACING ONLY: CPT

## 2021-01-29 PROCEDURE — 93016 CV STRESS TEST SUPVJ ONLY: CPT | Performed by: NURSE PRACTITIONER

## 2021-01-29 PROCEDURE — 93018 CV STRESS TEST I&R ONLY: CPT | Performed by: INTERNAL MEDICINE

## 2021-01-29 PROCEDURE — 93306 TTE W/DOPPLER COMPLETE: CPT

## 2021-01-29 PROCEDURE — A9500 TC99M SESTAMIBI: HCPCS | Performed by: INTERNAL MEDICINE

## 2021-01-29 RX ADMIN — REGADENOSON 0.4 MG: 0.08 INJECTION, SOLUTION INTRAVENOUS at 12:24

## 2021-01-29 RX ADMIN — TECHNETIUM TC 99M SESTAMIBI 1 DOSE: 1 INJECTION INTRAVENOUS at 12:24

## 2021-01-29 RX ADMIN — TECHNETIUM TC 99M SESTAMIBI 1 DOSE: 1 INJECTION INTRAVENOUS at 11:44

## 2021-01-31 LAB
BH CV STRESS BP STAGE 1: NORMAL
BH CV STRESS COMMENTS STAGE 1: NORMAL
BH CV STRESS DOSE REGADENOSON STAGE 1: 0.4
BH CV STRESS DURATION MIN STAGE 1: 0
BH CV STRESS DURATION SEC STAGE 1: 10
BH CV STRESS HR STAGE 1: 77
BH CV STRESS PROTOCOL 1: NORMAL
BH CV STRESS RECOVERY BP: NORMAL MMHG
BH CV STRESS RECOVERY HR: 70 BPM
BH CV STRESS STAGE 1: 1
MAXIMAL PREDICTED HEART RATE: 126 BPM
PERCENT MAX PREDICTED HR: 61.11 %
STRESS BASELINE BP: NORMAL MMHG
STRESS BASELINE HR: 71 BPM
STRESS PERCENT HR: 72 %
STRESS POST PEAK BP: NORMAL MMHG
STRESS POST PEAK HR: 77 BPM
STRESS TARGET HR: 107 BPM

## 2021-02-02 ENCOUNTER — TELEPHONE (OUTPATIENT)
Dept: CARDIOLOGY | Facility: CLINIC | Age: 86
End: 2021-02-02

## 2021-02-02 NOTE — TELEPHONE ENCOUNTER
Patient's daughter, Pauline, called with stress test results. Follow up scheduled with Dr Holder on Thursday. Estela Garcia MA      ----- Message from KATHLEEN Orosco sent at 2/1/2021  5:26 PM EST -----  Follow-up with me or Gallo in the next 2 to 3 weeks.    Result Text     1. Inferolateral ischemia by scintigraphy.     2. Preserved post-rest ejection fraction of 60% with no focal wall motion abnormalities.     3. No evidence of pharmacologically induced transient ischemic dilation or of increased lung uptake of radiopharmaceutical.

## 2021-02-03 LAB
BH CV ECHO MEAS - ACS: 1.6 CM
BH CV ECHO MEAS - AO MAX PG (FULL): 0.12 MMHG
BH CV ECHO MEAS - AO MAX PG: 3.4 MMHG
BH CV ECHO MEAS - AO MEAN PG (FULL): 1 MMHG
BH CV ECHO MEAS - AO MEAN PG: 2 MMHG
BH CV ECHO MEAS - AO ROOT AREA (BSA CORRECTED): 1.8
BH CV ECHO MEAS - AO ROOT AREA: 8.6 CM^2
BH CV ECHO MEAS - AO ROOT DIAM: 3.3 CM
BH CV ECHO MEAS - AO V2 MAX: 91.8 CM/SEC
BH CV ECHO MEAS - AO V2 MEAN: 66.8 CM/SEC
BH CV ECHO MEAS - AO V2 VTI: 21.4 CM
BH CV ECHO MEAS - BSA(HAYCOCK): 1.9 M^2
BH CV ECHO MEAS - BSA: 1.9 M^2
BH CV ECHO MEAS - BZI_BMI: 23.6 KILOGRAMS/M^2
BH CV ECHO MEAS - BZI_METRIC_HEIGHT: 175.3 CM
BH CV ECHO MEAS - BZI_METRIC_WEIGHT: 72.6 KG
BH CV ECHO MEAS - EDV(CUBED): 98 ML
BH CV ECHO MEAS - EDV(TEICH): 97.8 ML
BH CV ECHO MEAS - EF(CUBED): 65.9 %
BH CV ECHO MEAS - EF(TEICH): 57.5 %
BH CV ECHO MEAS - EF_3D-VOL: 57 %
BH CV ECHO MEAS - ESV(CUBED): 33.4 ML
BH CV ECHO MEAS - ESV(TEICH): 41.6 ML
BH CV ECHO MEAS - FS: 30.2 %
BH CV ECHO MEAS - IVS/LVPW: 0.96
BH CV ECHO MEAS - IVSD: 1.1 CM
BH CV ECHO MEAS - LA DIMENSION(2D): 4.4 CM
BH CV ECHO MEAS - LA DIMENSION: 4.4 CM
BH CV ECHO MEAS - LA/AO: 1.3
BH CV ECHO MEAS - LAT PEAK E' VEL: 5.6 CM/SEC
BH CV ECHO MEAS - LV IVRT: 0.11 SEC
BH CV ECHO MEAS - LV MASS(C)D: 194.8 GRAMS
BH CV ECHO MEAS - LV MASS(C)DI: 103.7 GRAMS/M^2
BH CV ECHO MEAS - LV MAX PG: 3.2 MMHG
BH CV ECHO MEAS - LV MEAN PG: 1 MMHG
BH CV ECHO MEAS - LV V1 MAX: 90.1 CM/SEC
BH CV ECHO MEAS - LV V1 MEAN: 51.4 CM/SEC
BH CV ECHO MEAS - LV V1 VTI: 20.6 CM
BH CV ECHO MEAS - LVIDD: 4.6 CM
BH CV ECHO MEAS - LVIDS: 3.2 CM
BH CV ECHO MEAS - LVPWD: 1.2 CM
BH CV ECHO MEAS - MED PEAK E' VEL: 4.6 CM/SEC
BH CV ECHO MEAS - MR MAX PG: 73.8 MMHG
BH CV ECHO MEAS - MR MAX VEL: 429.5 CM/SEC
BH CV ECHO MEAS - MV A MAX VEL: 83.6 CM/SEC
BH CV ECHO MEAS - MV DEC SLOPE: 267 CM/SEC^2
BH CV ECHO MEAS - MV DEC TIME: 0.23 SEC
BH CV ECHO MEAS - MV E MAX VEL: 65.6 CM/SEC
BH CV ECHO MEAS - MV E/A: 0.78
BH CV ECHO MEAS - MV MAX PG: 4.4 MMHG
BH CV ECHO MEAS - MV MEAN PG: 2 MMHG
BH CV ECHO MEAS - MV P1/2T MAX VEL: 65.7 CM/SEC
BH CV ECHO MEAS - MV P1/2T: 72.1 MSEC
BH CV ECHO MEAS - MV V2 MAX: 105 CM/SEC
BH CV ECHO MEAS - MV V2 MEAN: 60.6 CM/SEC
BH CV ECHO MEAS - MV V2 VTI: 35 CM
BH CV ECHO MEAS - MVA P1/2T LCG: 3.3 CM^2
BH CV ECHO MEAS - MVA(P1/2T): 3.1 CM^2
BH CV ECHO MEAS - PA MAX PG (FULL): 3.4 MMHG
BH CV ECHO MEAS - PA MAX PG: 4.4 MMHG
BH CV ECHO MEAS - PA MEAN PG (FULL): 1 MMHG
BH CV ECHO MEAS - PA MEAN PG: 2 MMHG
BH CV ECHO MEAS - PA V2 MAX: 105 CM/SEC
BH CV ECHO MEAS - PA V2 MEAN: 67.5 CM/SEC
BH CV ECHO MEAS - PA V2 VTI: 19.4 CM
BH CV ECHO MEAS - RAP SYSTOLE: 10 MMHG
BH CV ECHO MEAS - RV MAX PG: 0.98 MMHG
BH CV ECHO MEAS - RV MEAN PG: 1 MMHG
BH CV ECHO MEAS - RV V1 MAX: 49.6 CM/SEC
BH CV ECHO MEAS - RV V1 MEAN: 34.7 CM/SEC
BH CV ECHO MEAS - RV V1 VTI: 9.2 CM
BH CV ECHO MEAS - RVDD: 3 CM
BH CV ECHO MEAS - RVSP: 28 MMHG
BH CV ECHO MEAS - SI(AO): 97.4 ML/M^2
BH CV ECHO MEAS - SI(CUBED): 34.4 ML/M^2
BH CV ECHO MEAS - SI(TEICH): 29.9 ML/M^2
BH CV ECHO MEAS - SV(AO): 183 ML
BH CV ECHO MEAS - SV(CUBED): 64.6 ML
BH CV ECHO MEAS - SV(TEICH): 56.2 ML
BH CV ECHO MEAS - TR MAX VEL: 211 CM/SEC
BH CV ECHO MEASUREMENTS AVERAGE E/E' RATIO: 12.86
MAXIMAL PREDICTED HEART RATE: 126 BPM
STRESS TARGET HR: 107 BPM

## 2021-02-04 ENCOUNTER — TELEPHONE (OUTPATIENT)
Dept: CARDIOLOGY | Facility: CLINIC | Age: 86
End: 2021-02-04

## 2021-02-04 ENCOUNTER — OFFICE VISIT (OUTPATIENT)
Dept: CARDIOLOGY | Facility: CLINIC | Age: 86
End: 2021-02-04

## 2021-02-04 VITALS
WEIGHT: 161.2 LBS | HEIGHT: 69 IN | DIASTOLIC BLOOD PRESSURE: 65 MMHG | SYSTOLIC BLOOD PRESSURE: 148 MMHG | BODY MASS INDEX: 23.88 KG/M2 | HEART RATE: 85 BPM | OXYGEN SATURATION: 97 %

## 2021-02-04 DIAGNOSIS — R06.02 SHORTNESS OF BREATH: ICD-10-CM

## 2021-02-04 DIAGNOSIS — I25.10 3-VESSEL CAD: Primary | ICD-10-CM

## 2021-02-04 DIAGNOSIS — I95.1 ORTHOSTATIC HYPOTENSION: ICD-10-CM

## 2021-02-04 DIAGNOSIS — R53.82 CHRONIC FATIGUE: ICD-10-CM

## 2021-02-04 DIAGNOSIS — I20.9 ANGINA PECTORIS (HCC): ICD-10-CM

## 2021-02-04 DIAGNOSIS — R07.2 PRECORDIAL PAIN: ICD-10-CM

## 2021-02-04 DIAGNOSIS — E78.5 DYSLIPIDEMIA: ICD-10-CM

## 2021-02-04 DIAGNOSIS — R42 DIZZINESS: ICD-10-CM

## 2021-02-04 DIAGNOSIS — I10 ESSENTIAL HYPERTENSION: ICD-10-CM

## 2021-02-04 PROCEDURE — 99213 OFFICE O/P EST LOW 20 MIN: CPT | Performed by: INTERNAL MEDICINE

## 2021-02-04 RX ORDER — AMLODIPINE BESYLATE 2.5 MG/1
2.5 TABLET ORAL DAILY
Qty: 30 TABLET | Refills: 11 | Status: SHIPPED | OUTPATIENT
Start: 2021-02-04 | End: 2021-08-30

## 2021-02-04 RX ORDER — ATENOLOL 25 MG/1
12.5 TABLET ORAL DAILY
Qty: 15 TABLET | Refills: 11 | Status: SHIPPED | OUTPATIENT
Start: 2021-02-04 | End: 2022-01-21

## 2021-02-04 RX ORDER — MECLIZINE HYDROCHLORIDE 25 MG/1
TABLET ORAL 2 TIMES DAILY PRN
COMMUNITY
Start: 2021-01-12 | End: 2021-03-04

## 2021-02-04 NOTE — PROGRESS NOTES
Subjective   Steven López is a 89 y.o. male     Chief Complaint   Patient presents with   • Follow-up     Here for abnl. stress test   • Coronary Artery Disease   • Hyperlipidemia   • Hypertension       PROBLEM LIST:     1. Coronary artery disease with multiple pci's in the past.  1.1 CABG, March 2003 with LIMA to LAD, saphenous vein graft of first and second posterolateral marginal circumflex.  1.2 Repeat CABG, October 2007 with autologous saphenous vein to the LAD and diagonal vessels.  1.3 Follow up stress in August 2015 demonstrating posterolateral ischemia  1.3 History of multiple percutaneous interventions  1.4 [facility] 07/11/18 stenting x2 RCA; Dr. Fish Holder   1.5 Stress test, 1-, Inferolateral ischemia by scintigraphy  2. 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 Hypertension  4. Dyslipidemia  5. Dizziness       6. Fatigue      7. Echo, 1-, EF 55-57%, mild LVH, grade 1 DD, mild MR, mild AI, trivial-mild TR, pulm. Pressures high 20's       Specialty Problems        Cardiology Problems    3-vessel CAD        Angina pectoris (CMS/[facility])        Hypertension        Hypotension                HPI:  Mr. López returns for follow-up on testing.    He continues to experience angina.  This occurs with low level physical activity and sometimes with emotional distress.    Echo results were as described above.  Stress test demonstrated inferolateral ischemia and was similar to the stress test in 2018 which prompted stenting x2 of the right coronary artery.  Mr. Rodriguez has not had angina without provocation and he continues to be without failure or dysrhythmic symptoms.    We had an extended discussion today about therapeutic options.  Mr. López understands that either mechanical revascularization or medical therapy could be expected to improve angina but would not prolong life, improve heart pump function, or necessarily prevent heart  attack or other complications.  After a detailed risk-benefit assessment the patient elected to trial medical therapy.                          PRIOR MEDICATIONS    Current Outpatient Medications on File Prior to Visit   Medication Sig Dispense Refill   • Acetaminophen (TYLENOL ARTHRITIS PAIN PO) Take 650 mg by mouth As Needed.     • ALPRAZolam (XANAX) 0.5 MG tablet Take 0.5 mg by mouth As Needed for Anxiety or Sleep.     • aspirin (ASPIRIN LOW DOSE) 81 MG EC tablet Take 81 mg by mouth Daily.     • CALCIUM PO Take  by mouth. With vit. d     • Coenzyme Q10 (CO Q-10) 100 MG capsule Take 100 mg by mouth 2 (Two) Times a Day. (Patient taking differently: Take 100 mg by mouth Daily.)  0   • dorzolamide (TRUSOPT) 2 % ophthalmic solution Administer 1 drop to both eyes Daily.     • escitalopram (LEXAPRO) 10 MG tablet Take 1 tablet by mouth Daily. 90 tablet 3   • finasteride (PROSCAR) 5 MG tablet Take 5 mg by mouth Daily.     • HYDROcodone-acetaminophen (NORCO) 5-325 MG per tablet Take 1 tablet by mouth Every 8 (Eight) Hours As Needed for Mild Pain .     • latanoprost (XALATAN) 0.005 % ophthalmic solution Apply 125 mcg to eye daily.     • levothyroxine (SYNTHROID, LEVOTHROID) 50 MCG tablet Take 50 mcg by mouth Daily.     • Multiple Vitamins-Minerals (MULTIVITAMIN ADULTS PO) Take  by mouth Daily.     • nitroglycerin (NITROSTAT) 0.4 MG SL tablet PLACE 1 TAB UNDER TONGUE EVERY 5 MINUTES-UP TO 3 DOSES-AS NEEDED FOR CHEST PAIN 25 tablet 3   • nystatin (MYCOSTATIN) 834925 UNIT/GM cream Apply  topically to the appropriate area as directed As Needed.     • omeprazole (PriLOSEC) 20 MG capsule Take 20 mg by mouth Daily.     • Polyethylene Glycol 3350 (MIRALAX PO) Take  by mouth Daily As Needed.     • triamcinolone (KENALOG) 0.1 % cream Apply  topically to the appropriate area as directed. prn     • meclizine (ANTIVERT) 25 MG tablet 2 (Two) Times a Day As Needed. Taking mainly just once a day       No current facility-administered  medications on file prior to visit.        ALLERGIES:    Penicillins and Sulfa antibiotics    PAST MEDICAL HISTORY:    Past Medical History:   Diagnosis Date   • 3-vessel CAD    • Angina pectoris (CMS/HCC)    • Arthritis    • Dyslipidemia    • Enlarged prostate    • Glaucoma    • Hypertension        SURGICAL HISTORY:    Past Surgical History:   Procedure Laterality Date   • CARDIAC CATHETERIZATION      [facility] 07/2018, stenting to RCA x2   • CORONARY ARTERY BYPASS GRAFT      x2   • CORONARY STENT PLACEMENT     • INSERT / REPLACE / REMOVE PACEMAKER     • PROSTATE SURGERY     • TONSILLECTOMY         SOCIAL HISTORY:    Social History     Socioeconomic History   • Marital status:      Spouse name: Not on file   • Number of children: Not on file   • Years of education: Not on file   • Highest education level: Not on file   Tobacco Use   • Smoking status: Former Smoker   • Smokeless tobacco: Never Used   Substance and Sexual Activity   • Alcohol use: No   • Drug use: No   • Sexual activity: Defer       FAMILY HISTORY:    Family History   Problem Relation Age of Onset   • Stroke Mother    • Stroke Father    • Other Brother         CABG   • COPD Brother         Pulmonary Disease   • Stroke Other        Review of Systems   Constitutional: Positive for fatigue.   HENT: Negative.    Eyes: Positive for visual disturbance (glasses prn).   Respiratory: Positive for cough and shortness of breath (mildly).    Cardiovascular: Positive for chest pain and leg swelling (sock line). Negative for palpitations.   Gastrointestinal: Negative.    Endocrine: Negative.    Genitourinary:        Prostate issues   Musculoskeletal: Positive for arthralgias, gait problem (ambulates with cane) and myalgias.   Skin: Negative.    Allergic/Immunologic: Negative.    Neurological: Positive for dizziness and light-headedness.        Fell this am.    Hematological: Bruises/bleeds easily.   Psychiatric/Behavioral: Negative.        VISIT VITALS:  Vitals:   "   02/04/21 0905   BP: 148/65   BP Location: Left arm   Patient Position: Sitting   Pulse: 85   SpO2: 97%   Weight: 73.1 kg (161 lb 3.2 oz)   Height: 175 cm (68.9\")      /65 (BP Location: Left arm, Patient Position: Sitting)   Pulse 85   Ht 175 cm (68.9\")   Wt 73.1 kg (161 lb 3.2 oz)   SpO2 97%   BMI 23.88 kg/m²     RECENT LABS:    Objective       Physical Exam    Procedures      Assessment/Plan   #1.  Recurrent angina.  Stress test findings as above.  We will trial a combination of amlodipine 2.5 mg daily and a atenolol 12.5 mg daily.  Very low-dose therapy is elected because the patient has had difficulty with antianginal therapy in the past.  We discussed complications of bradycardia, hypotension, edema, and orthostasis.  The patient will take appropriate precautions.  If we cannot adequately control angina without intolerable side effects I would feel more comfortable at that juncture to proceed to mechanical intervention.    2.  Other problems are stable other medications will be continued.    3.  The patient understands to activate emergency medical services report or report to the emergency room for any chest pain not rapidly relieved by nitroglycerin.    4.  Mr. Aguayo will follow with Dr. Bishop as instructed and we will plan on seeing him in follow-up in 3 to 4 weeks or on a as needed basis.   Diagnosis Plan   1. 3-vessel CAD     2. Angina pectoris (CMS/HCC)     3. Chronic fatigue     4. Dizziness     5. Dyslipidemia     6. Essential hypertension     7. Orthostatic hypotension     8. Shortness of breath         No follow-ups on file.         Steven López  reports that he has quit smoking. He has never used smokeless tobacco.. I have educated him on the risk of diseases from using tobacco products such as cancer, COPD and heart disease.          ACP discussion was held with the patient during this visit. Patient has an advance directive (not in EMR), copy requested.states has a living will, " copy requested.      Patient's Body mass index is 23.88 kg/m². BMI is within normal parameters. No follow-up required..       Bri Duke LPN    Scribed for Dr. Fish Holder by Bri Duke LPN February 4, 2021 09:13 EST         Electronically signed by:            This note is dictated utilizing voice recognition software.  Although this record has been proof read, transcriptional errors may still be present. If questions occur regarding the content of this record please do not hesitate to call our office.

## 2021-02-04 NOTE — PATIENT INSTRUCTIONS
Amlodipine Oral Tablets  What is this medicine?  AMLODIPINE (am ZEKE jensen) is a calcium channel blocker. It relaxes your blood vessels and decreases the amount of work the heart has to do. It treats high blood pressure and/or prevents chest pain (also called angina).  This medicine may be used for other purposes; ask your health care provider or pharmacist if you have questions.  COMMON BRAND NAME(S): Norvasc  What should I tell my health care provider before I take this medicine?  They need to know if you have any of these conditions:  · heart disease  · liver disease  · an unusual or allergic reaction to amlodipine, other drugs, foods, dyes, or preservatives  · pregnant or trying to get pregnant  · breast-feeding  How should I use this medicine?  Take this drug by mouth. Take it as directed on the prescription label at the same time every day. You can take it with or without food. If it upsets your stomach, take it with food. Keep taking it unless your health care provider tells you to stop.  Talk to your health care provider about the use of this drug in children. While it may be prescribed for children as young as 6 for selected conditions, precautions do apply.  Overdosage: If you think you have taken too much of this medicine contact a poison control center or emergency room at once.  NOTE: This medicine is only for you. Do not share this medicine with others.  What if I miss a dose?  If you miss a dose, take it as soon as you can. If it is almost time for your next dose, take only that dose. Do not take double or extra doses.  What may interact with this medicine?  This medicine may interact with the following medications:  · clarithromycin  · cyclosporine  · diltiazem  · itraconazole  · simvastatin  · tacrolimus  This list may not describe all possible interactions. Give your health care provider a list of all the medicines, herbs, non-prescription drugs, or dietary supplements you use. Also tell them if  you smoke, drink alcohol, or use illegal drugs. Some items may interact with your medicine.  What should I watch for while using this medicine?  Visit your health care provider for regular checks on your progress. Check your blood pressure as directed. Ask your health care provider what your blood pressure should be. Also, find out when you should contact him or her.  Do not treat yourself for coughs, colds, or pain while you are using this drug without asking your health care provider for advice. Some drugs may increase your blood pressure.  You may get drowsy or dizzy. Do not drive, use machinery, or do anything that needs mental alertness until you know how this drug affects you. Do not stand up or sit up quickly, especially if you are an older patient. This reduces the risk of dizzy or fainting spells.  What side effects may I notice from receiving this medicine?  Side effects that you should report to your doctor or health care provider as soon as possible:  · allergic reactions (skin rash, itching or hives; swelling of the face, lips, or tongue)  · heart attack (trouble breathing; pain or tightness in the chest, neck, back or arms; unusually weak or tired)  · low blood pressure (dizziness; feeling faint or lightheaded, falls; unusually weak or tired)  Side effects that usually do not require medical attention (report these to your doctor or health care provider if they continue or are bothersome):  · facial flushing  · nausea  · palpitations  · stomach pain  · sudden weight gain  · swelling of the ankles, feet, hands  This list may not describe all possible side effects. Call your doctor for medical advice about side effects. You may report side effects to FDA at 7-824-FDA-5549.  Where should I keep my medicine?  Keep out of the reach of children and pets.  Store at room temperature between 59 and 86 degrees F (15 and 30 degrees C). Protect from light and moisture. Keep the container tightly closed. Throw away  any unused drug after the expiration date.  NOTE: This sheet is a summary. It may not cover all possible information. If you have questions about this medicine, talk to your doctor, pharmacist, or health care provider.  © 2020 [employer]/Gold Standard (2020-09-22 19:39:45)  Atenolol Tablets  What is this medicine?  ATENOLOL (a TEN oh lole) is a beta blocker. It decreases the amount of work your heart has to do and helps your heart beat regularly. It treats high blood pressure and/or prevent chest pain (also called angina). It is also used after a heart attack to prevent a second one.  This medicine may be used for other purposes; ask your health care provider or pharmacist if you have questions.  COMMON BRAND NAME(S): Tenormin  What should I tell my health care provider before I take this medicine?  They need to know if you have any of these conditions:  · diabetes  · heart or vessel disease like slow heart rate, worsening heart failure, heart block, sick sinus syndrome or Raynaud's disease  · kidney disease  · lung or breathing disease, like asthma or emphysema  · pheochromocytoma  · thyroid disease  · an unusual or allergic reaction to atenolol, other beta-blockers, medicines, foods, dyes, or preservatives  · pregnant or trying to get pregnant  · breast-feeding  How should I use this medicine?  Take this drug by mouth. Take it as directed on the prescription label at the same time every day. You can take it with or without food. If it upsets your stomach, take it with food. Keep taking it unless your health care provider tells you to stop.  Talk to your health care provider about the use of this drug in children. Special care may be needed.  Overdosage: If you think you have taken too much of this medicine contact a poison control center or emergency room at once.  NOTE: This medicine is only for you. Do not share this medicine with others.  What if I miss a dose?  If you miss a dose, take it as soon as you can. If  it is almost time for your next dose, take only that dose. Do not take double or extra doses.  What may interact with this medicine?  This medicine may interact with the following medications:  · certain medicines for blood pressure, heart disease, irregular heart beat  · clonidine  · digoxin  · diuretics  · dobutamine  · epinephrine  · isoproterenol  · NSAIDs, medicines for pain and inflammation, like ibuprofen or naproxen  · reserpine  This list may not describe all possible interactions. Give your health care provider a list of all the medicines, herbs, non-prescription drugs, or dietary supplements you use. Also tell them if you smoke, drink alcohol, or use illegal drugs. Some items may interact with your medicine.  What should I watch for while using this medicine?  Visit your doctor or health care professional for regular check ups. Check your blood pressure and pulse rate regularly. Ask your health care professional what your blood pressure and pulse rate should be, and when you should contact him or her.  You may get drowsy or dizzy. Do not drive, use machinery, or do anything that needs mental alertness until you know how this medicine affects you. Do not stand or sit up quickly. Alcohol may interfere with the effect of this medicine. Avoid alcoholic drinks.  This medicine may increase blood sugar. Ask your healthcare provider if changes in diet or medicines are needed if you have diabetes.  Do not treat yourself for coughs, colds, or pain while you are taking this medicine without asking your doctor or health care professional for advice. Some ingredients may increase your blood pressure.  What side effects may I notice from receiving this medicine?  Side effects that you should report to your doctor or health care professional as soon as possible:  · allergic reactions like skin rash, itching or hives, swelling of the face, lips, or tongue  · breathing problems  · changes in vision  · chest pain  · cold,  tingling, or numb hands or feet  · depression  · fast, irregular heartbeat  · feeling faint or lightheaded, falls  · fever with sore throat  · rapid weight gain  ·   signs and symptoms of high blood sugar such as being more thirsty or hungry or having to urinate more than normal. You may also feel very tired or have blurry vision.  · swollen ankles, legs  Side effects that usually do not require medical attention (report to your doctor or health care professional if they continue or are bothersome):  · anxiety, nervous  · diarrhea  · dry skin  · change in sex drive or performance  · headache  · nightmares or trouble sleeping  · short term memory loss  · stomach upset  · unusually tired  This list may not describe all possible side effects. Call your doctor for medical advice about side effects. You may report side effects to FDA at 7-757-FDA-3511.  Where should I keep my medicine?  Keep out of the reach of children and pets.  Store at room temperature between 20 and 25 degrees C (68 and 77 degrees F). Throw away any unused drug after the expiration date.  NOTE: This sheet is a summary. It may not cover all possible information. If you have questions about this medicine, talk to your doctor, pharmacist, or health care provider.  © 2020 [employer]/Gold Standard (2020-07-30 15:07:07)

## 2021-02-04 NOTE — TELEPHONE ENCOUNTER
Discussed at follow up today with Dr Holder. Estela Garcia MA        ----- Message from KATHLEEN Orosco sent at 2/4/2021  8:46 AM EST -----  Follow-up with testing as otherwise recommended.

## 2021-03-04 ENCOUNTER — OFFICE VISIT (OUTPATIENT)
Dept: CARDIOLOGY | Facility: CLINIC | Age: 86
End: 2021-03-04

## 2021-03-04 VITALS
BODY MASS INDEX: 23.64 KG/M2 | HEIGHT: 69 IN | HEART RATE: 83 BPM | WEIGHT: 159.6 LBS | SYSTOLIC BLOOD PRESSURE: 141 MMHG | OXYGEN SATURATION: 98 % | DIASTOLIC BLOOD PRESSURE: 70 MMHG

## 2021-03-04 DIAGNOSIS — R42 DIZZINESS: ICD-10-CM

## 2021-03-04 DIAGNOSIS — I25.10 3-VESSEL CAD: Primary | ICD-10-CM

## 2021-03-04 DIAGNOSIS — I95.1 ORTHOSTATIC HYPOTENSION: ICD-10-CM

## 2021-03-04 DIAGNOSIS — E78.5 DYSLIPIDEMIA: ICD-10-CM

## 2021-03-04 DIAGNOSIS — R06.02 SHORTNESS OF BREATH: ICD-10-CM

## 2021-03-04 DIAGNOSIS — I20.9 ANGINA PECTORIS (HCC): ICD-10-CM

## 2021-03-04 DIAGNOSIS — R53.82 CHRONIC FATIGUE: ICD-10-CM

## 2021-03-04 DIAGNOSIS — I10 ESSENTIAL HYPERTENSION: ICD-10-CM

## 2021-03-04 PROCEDURE — 99213 OFFICE O/P EST LOW 20 MIN: CPT | Performed by: INTERNAL MEDICINE

## 2021-03-04 RX ORDER — ISOSORBIDE MONONITRATE 30 MG/1
15 TABLET, EXTENDED RELEASE ORAL DAILY
Qty: 15 TABLET | Refills: 11 | Status: SHIPPED | OUTPATIENT
Start: 2021-03-04 | End: 2022-02-28

## 2021-03-04 RX ORDER — LORAZEPAM 0.5 MG/1
0.5 TABLET ORAL DAILY PRN
COMMUNITY
End: 2022-03-09

## 2021-08-06 ENCOUNTER — OFFICE VISIT (OUTPATIENT)
Dept: CARDIOLOGY | Facility: CLINIC | Age: 86
End: 2021-08-06

## 2021-08-06 DIAGNOSIS — I45.9 CARDIAC CONDUCTION DISORDER: Primary | ICD-10-CM

## 2021-08-06 PROCEDURE — 93288 INTERROG EVL PM/LDLS PM IP: CPT | Performed by: INTERNAL MEDICINE

## 2021-08-30 ENCOUNTER — OFFICE VISIT (OUTPATIENT)
Dept: CARDIOLOGY | Facility: CLINIC | Age: 86
End: 2021-08-30

## 2021-08-30 VITALS
WEIGHT: 152.8 LBS | HEIGHT: 69 IN | OXYGEN SATURATION: 94 % | DIASTOLIC BLOOD PRESSURE: 56 MMHG | SYSTOLIC BLOOD PRESSURE: 117 MMHG | HEART RATE: 80 BPM | BODY MASS INDEX: 22.63 KG/M2

## 2021-08-30 DIAGNOSIS — R06.02 SHORTNESS OF BREATH: ICD-10-CM

## 2021-08-30 DIAGNOSIS — M79.661 PAIN IN BOTH LOWER LEGS: ICD-10-CM

## 2021-08-30 DIAGNOSIS — I95.1 ORTHOSTATIC HYPOTENSION: Primary | ICD-10-CM

## 2021-08-30 DIAGNOSIS — R53.82 CHRONIC FATIGUE: ICD-10-CM

## 2021-08-30 DIAGNOSIS — I25.10 3-VESSEL CAD: ICD-10-CM

## 2021-08-30 DIAGNOSIS — E78.5 DYSLIPIDEMIA: ICD-10-CM

## 2021-08-30 DIAGNOSIS — R42 DIZZINESS: ICD-10-CM

## 2021-08-30 DIAGNOSIS — I10 ESSENTIAL HYPERTENSION: ICD-10-CM

## 2021-08-30 DIAGNOSIS — M79.662 PAIN IN BOTH LOWER LEGS: ICD-10-CM

## 2021-08-30 PROCEDURE — 99214 OFFICE O/P EST MOD 30 MIN: CPT | Performed by: INTERNAL MEDICINE

## 2021-08-30 NOTE — PROGRESS NOTES
Subjective   Steven López is a 89 y.o. male     Chief Complaint   Patient presents with   • Follow-up     Here for yearly f/u   • Coronary Artery Disease   • Hyperlipidemia   • Hypertension   • Hypotension       PROBLEM LIST:     1. Coronary artery disease with multiple pci's in the past.  1.1 CABG, March 2003 with LIMA to LAD, saphenous vein graft of first and second posterolateral marginal circumflex.  1.2 Repeat CABG, October 2007 with autologous saphenous vein to the LAD and diagonal vessels.  1.3 Follow up stress in August 2015 demonstrating posterolateral ischemia  1.3 History of multiple percutaneous interventions  1.4 [facility] 07/11/18 stenting x2 RCA; Dr. Fish Holder   1.5 Stress test, 1-, Inferolateral ischemia by scintigraphy  2. 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 Hypertension  4. Dyslipidemia  5. Dizziness       6. Fatigue      7. Echo, 1-, EF 55-57%, mild LVH, grade 1 DD, mild MR, mild AI, trivial-mild TR, pulm. Pressures high 20's      Specialty Problems        Cardiology Problems    3-vessel CAD        Angina pectoris (CMS/[facility])        Hypertension        Hypotension                HPI:  Mr. López returns for follow-up on the above.    He claims that he still feels poorly but his son describes that he is having fewer episodes of angina and using less nitroglycerin.  The patient describes that he uses sublingual nitroglycerin, on average, once every 2 to 3 days.  Mr. López also states that his predominantly orthostatic lightheadedness has worsened.  However, he has not been presyncopal or syncopal.  He describes truncal instability and generalized mild to moderate in balance.  He does not claudicate.  He has no symptoms of TIA or stroke.    We reviewed today the patient's competing symptoms of angina and dizziness.  I again explained to the patient that any decrease in treatment to address hypotension and  orthostatic symptoms would likely worsen angina.  However, the patient again describes that he does not want to pursue percutaneous intervention or invasive evaluation of any sort.  He relates no orthopnea, PND, or change in very minimal lower extremity edema.  He has no palpitations.  Blood pressures are lower than they have been in the past.                          PRIOR MEDICATIONS    Current Outpatient Medications on File Prior to Visit   Medication Sig Dispense Refill   • Acetaminophen (TYLENOL ARTHRITIS PAIN PO) Take 650 mg by mouth As Needed.     • amLODIPine (NORVASC) 2.5 MG tablet Take 1 tablet by mouth Daily. 30 tablet 11   • aspirin (ASPIRIN LOW DOSE) 81 MG EC tablet Take 81 mg by mouth Daily.     • atenolol (TENORMIN) 25 MG tablet Take 0.5 tablets by mouth Daily. 15 tablet 11   • CALCIUM PO Take  by mouth Daily. With vit. c     • Cholecalciferol (Vitamin D3) 25 MCG (1000 UT) capsule Take  by mouth Daily.     • Coenzyme Q10 (CO Q-10) 100 MG capsule Take 100 mg by mouth 2 (Two) Times a Day. (Patient taking differently: Take 100 mg by mouth Daily.)  0   • dorzolamide (TRUSOPT) 2 % ophthalmic solution Administer 1 drop to both eyes Daily.     • escitalopram (LEXAPRO) 10 MG tablet Take 1 tablet by mouth Daily. 90 tablet 3   • HYDROcodone-acetaminophen (NORCO) 5-325 MG per tablet Take 1 tablet by mouth Every 8 (Eight) Hours As Needed for Mild Pain .     • isosorbide mononitrate (IMDUR) 30 MG 24 hr tablet Take 0.5 tablets by mouth Daily. 15 tablet 11   • latanoprost (XALATAN) 0.005 % ophthalmic solution Apply 125 mcg to eye daily.     • levothyroxine (SYNTHROID, LEVOTHROID) 50 MCG tablet Take 50 mcg by mouth Daily.     • LORazepam (ATIVAN) 0.5 MG tablet Take 0.5 mg by mouth Daily As Needed for Anxiety.     • Multiple Vitamins-Minerals (MULTIVITAMIN ADULTS PO) Take  by mouth Daily.     • nystatin (MYCOSTATIN) 698670 UNIT/GM cream Apply  topically to the appropriate area as directed As Needed.     • omeprazole  (PriLOSEC) 20 MG capsule Take 20 mg by mouth Daily.     • Polyethylene Glycol 3350 (MIRALAX PO) Take  by mouth Daily As Needed.     • triamcinolone (KENALOG) 0.1 % cream Apply  topically to the appropriate area as directed. prn     • nitroglycerin (NITROSTAT) 0.4 MG SL tablet PLACE 1 TAB UNDER TONGUE EVERY 5 MINUTES-UP TO 3 DOSES-AS NEEDED FOR CHEST PAIN 25 tablet 3     No current facility-administered medications on file prior to visit.       ALLERGIES:    Penicillins and Sulfa antibiotics    PAST MEDICAL HISTORY:    Past Medical History:   Diagnosis Date   • 3-vessel CAD    • Angina pectoris (CMS/HCC)    • Arthritis    • Dyslipidemia    • Enlarged prostate    • Glaucoma    • Hypertension        SURGICAL HISTORY:    Past Surgical History:   Procedure Laterality Date   • CARDIAC CATHETERIZATION      [facility] 07/2018, stenting to RCA x2   • CORONARY ARTERY BYPASS GRAFT      x2   • CORONARY STENT PLACEMENT     • INSERT / REPLACE / REMOVE PACEMAKER     • PROSTATE SURGERY     • TONSILLECTOMY         SOCIAL HISTORY:    Social History     Socioeconomic History   • Marital status:      Spouse name: Not on file   • Number of children: Not on file   • Years of education: Not on file   • Highest education level: Not on file   Tobacco Use   • Smoking status: Former Smoker   • Smokeless tobacco: Never Used   Substance and Sexual Activity   • Alcohol use: No   • Drug use: No   • Sexual activity: Defer       FAMILY HISTORY:    Family History   Problem Relation Age of Onset   • Stroke Mother    • Stroke Father    • Other Brother         CABG   • COPD Brother         Pulmonary Disease   • Stroke Other        Review of Systems   Constitutional: Positive for fatigue.   HENT: Positive for hearing loss.    Eyes: Positive for visual disturbance (glasses prn).   Respiratory: Positive for shortness of breath.    Cardiovascular: Positive for chest pain (occas. ) and leg swelling (mildly). Negative for palpitations.   Gastrointestinal:  "Positive for constipation.   Endocrine: Negative.    Genitourinary: Negative.    Musculoskeletal: Positive for arthralgias, gait problem (ambulates with cane) and myalgias.   Skin: Negative.    Allergic/Immunologic: Negative.    Neurological: Positive for dizziness. Negative for syncope.   Hematological: Bruises/bleeds easily.   Psychiatric/Behavioral: Negative.        VISIT VITALS:  Vitals:    08/30/21 1026   BP: 117/56   BP Location: Left arm   Patient Position: Sitting   Pulse: 80   SpO2: 94%   Weight: 69.3 kg (152 lb 12.8 oz)   Height: 175 cm (68.9\")      /56 (BP Location: Left arm, Patient Position: Sitting)   Pulse 80   Ht 175 cm (68.9\")   Wt 69.3 kg (152 lb 12.8 oz)   SpO2 94%   BMI 22.63 kg/m²     RECENT LABS:    Objective       Physical Exam  Vitals and nursing note reviewed.   Constitutional:       General: He is not in acute distress.     Appearance: He is well-developed.   HENT:      Head: Normocephalic and atraumatic.   Eyes:      Conjunctiva/sclera: Conjunctivae normal.      Pupils: Pupils are equal, round, and reactive to light.   Neck:      Vascular: No carotid bruit, hepatojugular reflux or JVD.      Trachea: No tracheal deviation.      Comments: Nl. Carotid upstrokes  Cardiovascular:      Rate and Rhythm: Normal rate and regular rhythm.      Pulses:           Radial pulses are 2+ on the right side and 2+ on the left side.      Heart sounds: S1 normal and S2 normal. No murmur heard.   No friction rub. Gallop present. S4 sounds present. No S3 sounds.    Pulmonary:      Effort: Pulmonary effort is normal.      Breath sounds: Decreased breath sounds (very mildly) present. No wheezing, rhonchi or rales.      Comments: Nl. Expir. Phase  Nl. Breath sound intensity    Abdominal:      General: Bowel sounds are normal. There is no distension or abdominal bruit.      Palpations: Abdomen is soft. There is no mass.      Tenderness: There is no abdominal tenderness. There is no guarding or rebound.    "   Comments: No organomegaly   Musculoskeletal:         General: No tenderness or deformity. Normal range of motion.      Cervical back: Normal range of motion and neck supple.      Right lower leg: Edema present.      Left lower leg: Edema present.      Comments: LLE, trace edema, palpable pedal pulses, mild brianne. Stasis changes  RLE, trace edema, excellent pedal pulses, mild brianne. Stasis changes   Skin:     General: Skin is warm and dry.      Coloration: Skin is not pale.      Findings: No erythema or rash.   Neurological:      Mental Status: He is alert and oriented to person, place, and time.   Psychiatric:         Behavior: Behavior normal.         Thought Content: Thought content normal.         Judgment: Judgment normal.         Procedures      Assessment/Plan   #1.  Coronary artery disease.  I feel exertional dyspnea is an anginal equivalent.  Mr. López had stenting of the right coronary artery in 2018 in the setting of low-level exertional dyspnea and failed vein bypass grafts.  Subsequent stress test demonstrated inferolateral ischemia.  However, the patient does not wish to pursue percutaneous intervention.  Therefore we are left with trying to maximize the patient's medical therapy.  As dizziness now is the more significant symptom and his blood pressures are relatively low today we will trial stopping amlodipine.  The patient will report any worsening of angina.    2.  Controlled systemic hypertension.    3.  Dyslipidemia.    4.  Truncal instability.    5.  We discussed symptoms today which would require prompt emergency evaluation.  Otherwise, Mr. López will follow with Dr. Stephenie Bishop as instructed, and we will plan on seeing him in follow-up in 9 to 12 months or on a as needed basis as discussed in detail today with regard to symptoms of hypotension, worsening angina, heart failure, or malignant dysrhythmia.   Diagnosis Plan   1. Orthostatic hypotension     2. Essential hypertension     3.  Dyslipidemia     4. 3-vessel CAD     5. Pain in both lower legs     6. Shortness of breath     7. Dizziness     8. Chronic fatigue         No follow-ups on file.         Steven López  reports that he has quit smoking. He has never used smokeless tobacco.. I have educated him on the risk of diseases from using tobacco products such as cancer, COPD and heart disease.      ACP discussion was held with the patient during this visit. Patient has an advance directive (not in EMR), copy requested.states has a living will, copy requested.         Patient's Body mass index is 22.63 kg/m². indicating that he is within normal range (BMI 18.5-24.9). No BMI management plan needed..       Bri Duke LPN    Scribed for Dr. Fish Holder by Bri Duke LPN August 30, 2021 10:32 EDT         Electronically signed by:            This note is dictated utilizing voice recognition software.  Although this record has been proof read, transcriptional errors may still be present. If questions occur regarding the content of this record please do not hesitate to call our office.

## 2022-01-21 DIAGNOSIS — R07.2 PRECORDIAL PAIN: ICD-10-CM

## 2022-01-21 RX ORDER — ATENOLOL 25 MG/1
TABLET ORAL
Qty: 15 TABLET | Refills: 11 | Status: SHIPPED | OUTPATIENT
Start: 2022-01-21 | End: 2022-03-09 | Stop reason: SDUPTHER

## 2022-01-26 NOTE — PROGRESS NOTES
Health Maintenance Due   Topic Date Due   • Shingles Vaccine (1 of 2) Never done       Patient is due for topics as listed above but is not proceeding with Immunization(s) Shingles at this time. Education provided for Immunization(s) Shingles.      Recent PHQ 2/9 Score    PHQ 2:  Date Adult PHQ 2 Score Adult PHQ 2 Interpretation   1/26/2022 6 Further screening needed       PHQ 9:  Date Adult PHQ 9 Score Adult PHQ 9 Interpretation   1/26/2022 18 Moderately Severe Depression      Subjective   Steven López is a 89 y.o. male     Chief Complaint   Patient presents with   • Follow-up     Here for 4 week f/u   • Coronary Artery Disease   • Hyperlipidemia   • Hypertension       PROBLEM LIST:     1. Coronary artery disease with multiple pci's in the past.  1.1 CABG, March 2003 with LIMA to LAD, saphenous vein graft of first and second posterolateral marginal circumflex.  1.2 Repeat CABG, October 2007 with autologous saphenous vein to the LAD and diagonal vessels.  1.3 Follow up stress in August 2015 demonstrating posterolateral ischemia  1.3 History of multiple percutaneous interventions  1.4 [facility] 07/11/18 stenting x2 RCA; Dr. Fish Holder   1.5 Stress test, 1-, Inferolateral ischemia by scintigraphy  2. 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 Hypertension  4. Dyslipidemia  5. Dizziness       6. Fatigue      7. Echo, 1-, EF 55-57%, mild LVH, grade 1 DD, mild MR, mild AI, trivial-mild TR, pulm. Pressures high 20's      Specialty Problems        Cardiology Problems    3-vessel CAD        Angina pectoris (CMS/[facility])        Hypertension        Hypotension                HPI:  Mr. López returns for follow-up to assess response to therapy.    He feels that his angina is somewhat better but his dizziness is somewhat worse.  He has not been presyncopal or syncopal.    We had a long discussion today about medication use in the setting of stable angina.  Mr. López could not make a decision whether he felt better on current medications or off.  We did discuss the possibility of using sublingual nitroglycerin on a as needed basis whenever he knows he is going to be physically active.  I am reluctant to increase amlodipine in particular or beta-blocker because of orthostatic lightheadedness.  Beta-blocker had to be continued in the past for similar symptoms.                        PRIOR MEDICATIONS    Current Outpatient  Medications on File Prior to Visit   Medication Sig Dispense Refill   • Acetaminophen (TYLENOL ARTHRITIS PAIN PO) Take 650 mg by mouth As Needed.     • ALPRAZolam (XANAX) 0.5 MG tablet Take 0.5 mg by mouth As Needed for Anxiety or Sleep.     • amLODIPine (NORVASC) 2.5 MG tablet Take 1 tablet by mouth Daily. 30 tablet 11   • aspirin (ASPIRIN LOW DOSE) 81 MG EC tablet Take 81 mg by mouth Daily.     • atenolol (TENORMIN) 25 MG tablet Take 0.5 tablets by mouth Daily. 15 tablet 11   • CALCIUM PO Take  by mouth Daily. With vit. c     • Cholecalciferol (Vitamin D3) 25 MCG (1000 UT) capsule Take  by mouth Daily.     • Coenzyme Q10 (CO Q-10) 100 MG capsule Take 100 mg by mouth 2 (Two) Times a Day. (Patient taking differently: Take 100 mg by mouth Daily.)  0   • dorzolamide (TRUSOPT) 2 % ophthalmic solution Administer 1 drop to both eyes Daily.     • escitalopram (LEXAPRO) 10 MG tablet Take 1 tablet by mouth Daily. 90 tablet 3   • HYDROcodone-acetaminophen (NORCO) 5-325 MG per tablet Take 1 tablet by mouth Every 8 (Eight) Hours As Needed for Mild Pain .     • latanoprost (XALATAN) 0.005 % ophthalmic solution Apply 125 mcg to eye daily.     • levothyroxine (SYNTHROID, LEVOTHROID) 50 MCG tablet Take 50 mcg by mouth Daily.     • LORazepam (ATIVAN) 0.5 MG tablet Take 0.5 mg by mouth Daily As Needed for Anxiety.     • Multiple Vitamins-Minerals (MULTIVITAMIN ADULTS PO) Take  by mouth Daily.     • nystatin (MYCOSTATIN) 064255 UNIT/GM cream Apply  topically to the appropriate area as directed As Needed.     • omeprazole (PriLOSEC) 20 MG capsule Take 20 mg by mouth Daily.     • Polyethylene Glycol 3350 (MIRALAX PO) Take  by mouth Daily As Needed.     • triamcinolone (KENALOG) 0.1 % cream Apply  topically to the appropriate area as directed. prn     • nitroglycerin (NITROSTAT) 0.4 MG SL tablet PLACE 1 TAB UNDER TONGUE EVERY 5 MINUTES-UP TO 3 DOSES-AS NEEDED FOR CHEST PAIN 25 tablet 3   • [DISCONTINUED] finasteride (PROSCAR) 5 MG  tablet Take 5 mg by mouth Daily.     • [DISCONTINUED] meclizine (ANTIVERT) 25 MG tablet 2 (Two) Times a Day As Needed. Taking mainly just once a day       No current facility-administered medications on file prior to visit.        ALLERGIES:    Penicillins and Sulfa antibiotics    PAST MEDICAL HISTORY:    Past Medical History:   Diagnosis Date   • 3-vessel CAD    • Angina pectoris (CMS/HCC)    • Arthritis    • Dyslipidemia    • Enlarged prostate    • Glaucoma    • Hypertension        SURGICAL HISTORY:    Past Surgical History:   Procedure Laterality Date   • CARDIAC CATHETERIZATION      [facility] 07/2018, stenting to RCA x2   • CORONARY ARTERY BYPASS GRAFT      x2   • CORONARY STENT PLACEMENT     • INSERT / REPLACE / REMOVE PACEMAKER     • PROSTATE SURGERY     • TONSILLECTOMY         SOCIAL HISTORY:    Social History     Socioeconomic History   • Marital status:      Spouse name: Not on file   • Number of children: Not on file   • Years of education: Not on file   • Highest education level: Not on file   Tobacco Use   • Smoking status: Former Smoker   • Smokeless tobacco: Never Used   Substance and Sexual Activity   • Alcohol use: No   • Drug use: No   • Sexual activity: Defer       FAMILY HISTORY:    Family History   Problem Relation Age of Onset   • Stroke Mother    • Stroke Father    • Other Brother         CABG   • COPD Brother         Pulmonary Disease   • Stroke Other        Review of Systems   Constitutional: Positive for fatigue.   HENT: Negative.    Eyes: Positive for visual disturbance (glasses prn).   Respiratory: Positive for shortness of breath.    Cardiovascular: Positive for chest pain. Negative for palpitations and leg swelling.   Gastrointestinal: Positive for diarrhea and nausea.   Endocrine: Negative.    Genitourinary: Negative.    Musculoskeletal: Positive for arthralgias, gait problem (ambulates with cane) and myalgias.   Skin: Negative.    Allergic/Immunologic: Negative.    Neurological:  "Positive for light-headedness. Negative for syncope.   Hematological: Bruises/bleeds easily.   Psychiatric/Behavioral: Negative.        VISIT VITALS:  Vitals:    03/04/21 0931   BP: 141/70   BP Location: Left arm   Patient Position: Sitting   Pulse: 83   SpO2: 98%   Weight: 72.4 kg (159 lb 9.6 oz)   Height: 175 cm (68.9\")      /70 (BP Location: Left arm, Patient Position: Sitting)   Pulse 83   Ht 175 cm (68.9\")   Wt 72.4 kg (159 lb 9.6 oz)   SpO2 98%   BMI 23.64 kg/m²     RECENT LABS:    Objective       Physical Exam    Procedures      Assessment/Plan   #1.  Angina.  See discussion in HPI above.  We will continue current medications, use sublingual nitroglycerin on a as needed basis, and trial of isosorbide 15 mg daily.  The patient will report any adverse effects.    2.  All other medications will be continued.    3.  Mr. López was encouraged to remain physically active and was given instructions reference for needed nitroglycerin use and activate emergency medical services for unrelieved chest pain.    4.  We will plan on seeing Mr. López in follow-up in 3 to 4 months if he tolerates medications, or on a as needed basis for difficulties, and he will follow with Dr. Poornima Bishop as instructed.   Diagnosis Plan   1. 3-vessel CAD     2. Angina pectoris (CMS/HCC)     3. Dyslipidemia     4. Essential hypertension     5. Orthostatic hypotension     6. Shortness of breath     7. Chronic fatigue     8. Dizziness         No follow-ups on file.         Steven López  reports that he has quit smoking. He has never used smokeless tobacco.. I have educated him on the risk of diseases from using tobacco products such as cancer, COPD and heart disease.          ACP discussion was held with the patient during this visit. Patient has an advance directive (not in EMR), copy requested.states has a living will, copy requested.      Patient's Body mass index is 23.64 kg/m². BMI is within normal parameters. No follow-up " required..       Bri Duke LPN    Scribed for Dr. Fish Holder by Bri Duke LPN March 4, 2021 10:06 EST         Electronically signed by:            This note is dictated utilizing voice recognition software.  Although this record has been proof read, transcriptional errors may still be present. If questions occur regarding the content of this record please do not hesitate to call our office.

## 2022-02-26 DIAGNOSIS — I20.9 ANGINA PECTORIS: ICD-10-CM

## 2022-02-28 RX ORDER — ISOSORBIDE MONONITRATE 30 MG/1
TABLET, EXTENDED RELEASE ORAL
Qty: 15 TABLET | Refills: 11 | Status: SHIPPED | OUTPATIENT
Start: 2022-02-28 | End: 2022-03-09 | Stop reason: SDUPTHER

## 2022-03-09 ENCOUNTER — TELEPHONE (OUTPATIENT)
Dept: CARDIOLOGY | Facility: CLINIC | Age: 87
End: 2022-03-09

## 2022-03-09 ENCOUNTER — OFFICE VISIT (OUTPATIENT)
Dept: CARDIOLOGY | Facility: CLINIC | Age: 87
End: 2022-03-09

## 2022-03-09 VITALS
HEART RATE: 160 BPM | WEIGHT: 153.8 LBS | DIASTOLIC BLOOD PRESSURE: 77 MMHG | OXYGEN SATURATION: 89 % | BODY MASS INDEX: 22.78 KG/M2 | HEIGHT: 69 IN | SYSTOLIC BLOOD PRESSURE: 161 MMHG

## 2022-03-09 DIAGNOSIS — Z95.0 PACEMAKER: ICD-10-CM

## 2022-03-09 DIAGNOSIS — I10 PRIMARY HYPERTENSION: ICD-10-CM

## 2022-03-09 DIAGNOSIS — I48.91 ATRIAL FIBRILLATION, UNSPECIFIED TYPE: Primary | ICD-10-CM

## 2022-03-09 DIAGNOSIS — I42.9 CARDIOMYOPATHY, UNSPECIFIED TYPE: ICD-10-CM

## 2022-03-09 DIAGNOSIS — R07.2 PRECORDIAL PAIN: ICD-10-CM

## 2022-03-09 DIAGNOSIS — I25.119 CORONARY ARTERY DISEASE INVOLVING NATIVE CORONARY ARTERY OF NATIVE HEART WITH ANGINA PECTORIS: ICD-10-CM

## 2022-03-09 PROCEDURE — 99214 OFFICE O/P EST MOD 30 MIN: CPT | Performed by: PHYSICIAN ASSISTANT

## 2022-03-09 PROCEDURE — 93288 INTERROG EVL PM/LDLS PM IP: CPT | Performed by: PHYSICIAN ASSISTANT

## 2022-03-09 PROCEDURE — 93000 ELECTROCARDIOGRAM COMPLETE: CPT | Performed by: PHYSICIAN ASSISTANT

## 2022-03-09 RX ORDER — ATENOLOL 25 MG/1
25 TABLET ORAL DAILY
Qty: 30 TABLET | Refills: 6 | Status: SHIPPED | OUTPATIENT
Start: 2022-03-09 | End: 2022-03-28 | Stop reason: SDUPTHER

## 2022-03-09 RX ORDER — ISOSORBIDE MONONITRATE 30 MG/1
30 TABLET, EXTENDED RELEASE ORAL DAILY
Qty: 30 TABLET | Refills: 6 | Status: SHIPPED | OUTPATIENT
Start: 2022-03-09 | End: 2022-10-17

## 2022-03-09 RX ORDER — CLOPIDOGREL BISULFATE 75 MG/1
75 TABLET ORAL DAILY
Qty: 30 TABLET | Refills: 6 | Status: SHIPPED | OUTPATIENT
Start: 2022-03-09 | End: 2022-09-23

## 2022-03-09 NOTE — TELEPHONE ENCOUNTER
Pauline Potter called for Mr. López she states that he is having chest pain in the mornings . He has been taking Nitro along as needed. He is still having weakness and dizziness but she states that those conditions are on going. I advised to go to ER but she says he is not having pain right now. She wants him to be seen .     Called to give patient apt for today at 2:45 with donavon Carpenter had already called back and gotten apt with Steven WANG.

## 2022-03-09 NOTE — PROGRESS NOTES
Problem list     Subjective   Steven López is a 89 y.o. male     Chief Complaint   Patient presents with   • Follow-up     7 month    • Chest Pain     Has been having over the last few weeks / having to take nitro almost daily    PROBLEM LIST:      1. Coronary artery disease with multiple pci's in the past.  1.1 CABG, March 2003 with LIMA to LAD, saphenous vein graft of first and second posterolateral marginal circumflex.  1.2 Repeat CABG, October 2007 with autologous saphenous vein to the LAD and diagonal vessels.  1.3 Follow up stress in August 2015 demonstrating posterolateral ischemia  1.3 History of multiple percutaneous interventions  1.4 [facility] 07/11/18 stenting x2 RCA; Dr. Fish Holder   1.5 Stress test, 1-, Inferolateral ischemia by scintigraphy  2. 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 Hypertension  4. Dyslipidemia  5. Dizziness       6. Fatigue      7. Echo, 1-, EF 55-57%, mild LVH, grade 1 DD, mild MR, mild AI, trivial-mild TR, pulm. Pressures high 20's    HPI  The patient presents to the clinic today at the request of family members, and at the request of the patient himself.  He presents because of chest pain.  He reports increasing chest discomfort as of recent.  He has had to increase utilization of sublingual nitroglycerin because of severity of chest pain.  His dyspnea is progressing.  He is very hypertensive today as well, but believes that this is secondary to his chest discomfort.  His initial heart rate was recorded at 160 beats a minute, but the pacemaker rep was brought to interrogate his device.  No dysrhythmic activity can be identified.  I believe his heart rate initially recorded at 160 is inaccurate.  Irregardless, he reports that his chest discomfort and clinical scenario is very similar to what he has had prior to bypass and stenting in the past.  He wants evaluation and presents today in that  setting.    Current Outpatient Medications on File Prior to Visit   Medication Sig Dispense Refill   • Acetaminophen (TYLENOL ARTHRITIS PAIN PO) Take 650 mg by mouth As Needed.     • aspirin (aspirin) 81 MG EC tablet Take 81 mg by mouth Daily.     • Cholecalciferol (Vitamin D3) 25 MCG (1000 UT) capsule Take  by mouth Daily.     • dorzolamide (TRUSOPT) 2 % ophthalmic solution Administer 1 drop to both eyes Daily.     • escitalopram (LEXAPRO) 10 MG tablet Take 1 tablet by mouth Daily. 90 tablet 3   • HYDROcodone-acetaminophen (NORCO) 5-325 MG per tablet Take 1 tablet by mouth Every 8 (Eight) Hours As Needed for Mild Pain .     • latanoprost (XALATAN) 0.005 % ophthalmic solution Apply 125 mcg to eye daily.     • levothyroxine (SYNTHROID, LEVOTHROID) 50 MCG tablet Take 50 mcg by mouth Daily.     • Multiple Vitamins-Minerals (MULTIVITAMIN ADULTS PO) Take  by mouth Daily.     • nitroglycerin (NITROSTAT) 0.4 MG SL tablet PLACE 1 TAB UNDER TONGUE EVERY 5 MINUTES-UP TO 3 DOSES-AS NEEDED FOR CHEST PAIN 25 tablet 3   • omeprazole (PriLOSEC) 20 MG capsule Take 20 mg by mouth Daily.     • Polyethylene Glycol 3350 (MIRALAX PO) Take  by mouth Daily As Needed.     • [DISCONTINUED] atenolol (TENORMIN) 25 MG tablet TAKE HALF TABLET BY MOUTH DAILY 15 tablet 11   • [DISCONTINUED] isosorbide mononitrate (IMDUR) 30 MG 24 hr tablet TABLET ONE-HALF TABLET BY MOUTH EVERY DAY 15 tablet 11   • [DISCONTINUED] CALCIUM PO Take  by mouth Daily. With vit. c     • [DISCONTINUED] Coenzyme Q10 (CO Q-10) 100 MG capsule Take 100 mg by mouth 2 (Two) Times a Day. (Patient taking differently: Take 100 mg by mouth Daily.)  0   • [DISCONTINUED] LORazepam (ATIVAN) 0.5 MG tablet Take 0.5 mg by mouth Daily As Needed for Anxiety.     • [DISCONTINUED] nystatin (MYCOSTATIN) 002397 UNIT/GM cream Apply  topically to the appropriate area as directed As Needed.     • [DISCONTINUED] triamcinolone (KENALOG) 0.1 % cream Apply  topically to the appropriate area as  "directed. prn       No current facility-administered medications on file prior to visit.       Penicillins and Sulfa antibiotics    Past Medical History:   Diagnosis Date   • 3-vessel CAD    • Angina pectoris (HCC)    • Arthritis    • Dyslipidemia    • Enlarged prostate    • Glaucoma    • Hypertension        Social History     Socioeconomic History   • Marital status:    Tobacco Use   • Smoking status: Former Smoker   • Smokeless tobacco: Never Used   Substance and Sexual Activity   • Alcohol use: No   • Drug use: No   • Sexual activity: Defer       Family History   Problem Relation Age of Onset   • Stroke Mother    • Stroke Father    • Other Brother         CABG   • COPD Brother         Pulmonary Disease   • Stroke Other        Review of Systems   Constitutional: Positive for chills. Negative for fatigue and fever.   HENT: Positive for rhinorrhea. Negative for congestion and sore throat.    Eyes: Negative.  Negative for visual disturbance.   Respiratory: Positive for chest tightness and shortness of breath. Negative for wheezing.    Cardiovascular: Positive for chest pain, palpitations and leg swelling.   Gastrointestinal: Negative.    Endocrine: Positive for cold intolerance.   Genitourinary: Negative.    Musculoskeletal: Positive for back pain and neck pain. Negative for arthralgias.   Skin: Negative.  Negative for rash and wound.   Allergic/Immunologic: Positive for environmental allergies.   Neurological: Positive for dizziness, numbness (legs ) and headaches. Negative for weakness.   Hematological: Bruises/bleeds easily (bruises ).   Psychiatric/Behavioral: Negative.  Negative for sleep disturbance.       Objective   Vitals:    03/09/22 1544   BP: 161/77   BP Location: Left arm   Patient Position: Sitting   Pulse: (!) 160   SpO2: (!) 89%   Weight: 69.8 kg (153 lb 12.8 oz)   Height: 175 cm (68.9\")      /77 (BP Location: Left arm, Patient Position: Sitting)   Pulse (!) 160   Ht 175 cm (68.9\")   " Wt 69.8 kg (153 lb 12.8 oz)   SpO2 (!) 89%   BMI 22.78 kg/m²    Lab Results (most recent)     None        Physical Exam  Vitals and nursing note reviewed.   Constitutional:       General: He is not in acute distress.     Appearance: He is well-developed.   HENT:      Head: Normocephalic and atraumatic.   Eyes:      Conjunctiva/sclera: Conjunctivae normal.      Pupils: Pupils are equal, round, and reactive to light.   Neck:      Vascular: No JVD.      Trachea: No tracheal deviation.   Cardiovascular:      Rate and Rhythm: Normal rate and regular rhythm.      Heart sounds: Normal heart sounds.   Pulmonary:      Effort: Pulmonary effort is normal.      Breath sounds: Normal breath sounds.   Abdominal:      General: Bowel sounds are normal. There is no distension.      Palpations: Abdomen is soft. There is no mass.      Tenderness: There is no abdominal tenderness. There is no guarding or rebound.   Musculoskeletal:         General: No tenderness or deformity. Normal range of motion.      Cervical back: Normal range of motion and neck supple.      Right lower le+ Edema present.      Left lower le+ Edema present.   Skin:     General: Skin is warm and dry.      Coloration: Skin is not pale.      Findings: No erythema or rash.   Neurological:      Mental Status: He is alert and oriented to person, place, and time.   Psychiatric:         Behavior: Behavior normal.         Thought Content: Thought content normal.         Judgment: Judgment normal.           Procedure     ECG 12 Lead    Date/Time: 3/9/2022 3:55 PM  Performed by: Steven Escobedo PA  Authorized by: Steven Escobedo PA   Comparison: compared with previous ECG from 12/15/2020  Comparison to previous ECG: AV pacing without dysrhythmic activity noted.  No acute changes can be elicited from this EKG.                 Assessment/Plan      Diagnosis Plan   1. Precordial pain  atenolol (TENORMIN) 25 MG tablet   2. Coronary artery disease involving native  coronary artery of native heart with angina pectoris (HCC)  isosorbide mononitrate (IMDUR) 30 MG 24 hr tablet   3. Primary hypertension     4. Pacemaker       1.  The patient presents because of increasing chest pain, now even occurring at rest.  This is consistent with what he has experienced with cardiac presentations in the past.  He believes this to be true angina.  I have discussed with this gentleman numerous options.  We discussed admission for expedited evaluation.  Given advanced age and his unwillingness to proceed with further cardiac testing and evaluation at this time, he wants no further work-up.  He wants medical management only.  We have discussed in detail risk of unstable angina, MI, CHF, dysrhythmias, and issues otherwise.  He acknowledges this risk but wants no work-up.  He wants medications only.    2.  I have asked of both the patient and his daughter that they at least consider further cardiac evaluation.  They will discuss this at home and call should they change their mind.  He wants nothing further at this time except medication modification.    3.  I would increase atenolol to 25 mg a full tab a day.  I will also increase isosorbide to 30 mg a full tablet daily.  He currently is taking only half of those medications a day.  I will increase further pending response and pending need.  The patient and his family will contact the clinic within a couple of days to report clinical scenario and symptoms.  I can adjust further at that time if needed.    4.  I would also add Plavix 75 mg daily to current aspirin therapy.    5.  He will continue medical regimen otherwise without change.    6.  For acute issues, he will proceed on to the emergency room at my request.  We will see him routinely through the clinic otherwise.            Advance Care Planning   ACP discussion was declined by the patient. Patient does not have an advance directive, declines further assistance.          Electronically  signed by:

## 2022-03-28 ENCOUNTER — TELEPHONE (OUTPATIENT)
Dept: CARDIOLOGY | Facility: CLINIC | Age: 87
End: 2022-03-28

## 2022-03-28 DIAGNOSIS — R07.2 PRECORDIAL PAIN: ICD-10-CM

## 2022-03-28 RX ORDER — ATENOLOL 25 MG/1
TABLET ORAL
Qty: 30 TABLET | Refills: 6 | Status: SHIPPED | OUTPATIENT
Start: 2022-03-28 | End: 2022-12-08 | Stop reason: SDUPTHER

## 2022-03-28 NOTE — TELEPHONE ENCOUNTER
Per Steven Escobedo,PAC note on 03/09/22  I would increase atenolol to 25 mg a full tab a day.  I will also increase isosorbide to 30 mg a full tablet daily.  He currently is taking only half of those medications a day.  I will increase further pending response and pending need.  The patient and his family will contact the clinic within a couple of days to report clinical scenario and symptoms.  I can adjust further at that time if needed.            Pt's daughter called and states been having more weakness and dizziness, since increasing Atenolol/ Isosorbide. States she spoke with father, and he advised to her Chest pain is better, and not as frequent but notices in the am with waking up. Was placed on these medications for Angina at last visit  New or worsening sx go to ER in meantime.

## 2022-03-28 NOTE — TELEPHONE ENCOUNTER
Per Steven Escobedo,PAC Verbal  Return to previous dose of Atenolol 25 1/2 tab daily. Monitor Sx.     Call back in next few days and let us know how he is doing. Pts daughter Pauline verbally understands.

## 2022-05-21 DIAGNOSIS — R07.2 PRECORDIAL PAIN: ICD-10-CM

## 2022-05-21 DIAGNOSIS — R94.39 ABNORMAL STRESS TEST: ICD-10-CM

## 2022-05-21 DIAGNOSIS — R06.02 SOB (SHORTNESS OF BREATH): ICD-10-CM

## 2022-05-21 DIAGNOSIS — I25.118 CORONARY ARTERY DISEASE OF NATIVE ARTERY OF NATIVE HEART WITH STABLE ANGINA PECTORIS: ICD-10-CM

## 2022-05-21 DIAGNOSIS — I10 ESSENTIAL HYPERTENSION: ICD-10-CM

## 2022-05-23 RX ORDER — NITROGLYCERIN 0.4 MG/1
TABLET SUBLINGUAL
Qty: 25 TABLET | Refills: 3 | Status: SHIPPED | OUTPATIENT
Start: 2022-05-23

## 2022-06-09 ENCOUNTER — OFFICE VISIT (OUTPATIENT)
Dept: CARDIOLOGY | Facility: CLINIC | Age: 87
End: 2022-06-09

## 2022-06-09 VITALS
OXYGEN SATURATION: 97 % | HEART RATE: 95 BPM | HEIGHT: 69 IN | WEIGHT: 147.8 LBS | SYSTOLIC BLOOD PRESSURE: 119 MMHG | DIASTOLIC BLOOD PRESSURE: 72 MMHG | BODY MASS INDEX: 21.89 KG/M2

## 2022-06-09 DIAGNOSIS — I25.10 3-VESSEL CAD: Primary | ICD-10-CM

## 2022-06-09 DIAGNOSIS — R42 DIZZINESS: ICD-10-CM

## 2022-06-09 DIAGNOSIS — I10 PRIMARY HYPERTENSION: ICD-10-CM

## 2022-06-09 DIAGNOSIS — R06.02 SHORTNESS OF BREATH: ICD-10-CM

## 2022-06-09 DIAGNOSIS — R53.82 CHRONIC FATIGUE: ICD-10-CM

## 2022-06-09 DIAGNOSIS — E78.5 DYSLIPIDEMIA: ICD-10-CM

## 2022-06-09 DIAGNOSIS — I95.1 ORTHOSTATIC HYPOTENSION: ICD-10-CM

## 2022-06-09 PROCEDURE — 99214 OFFICE O/P EST MOD 30 MIN: CPT | Performed by: INTERNAL MEDICINE

## 2022-06-09 RX ORDER — DOCUSATE SODIUM 100 MG/1
100 CAPSULE, LIQUID FILLED ORAL DAILY PRN
COMMUNITY

## 2022-06-09 RX ORDER — ACETAMINOPHEN 500 MG
1000 TABLET ORAL 2 TIMES DAILY
COMMUNITY

## 2022-06-09 NOTE — PROGRESS NOTES
Subjective   Steven López is a 89 y.o. male     Chief Complaint   Patient presents with   • Follow-up     Here for 6-9 mo. F/u   • Coronary Artery Disease   • Hyperlipidemia   • Hypertension       PROBLEM LIST:     1. Coronary artery disease with multiple pci's in the past.  1.1 CABG, March 2003 with LIMA to LAD, saphenous vein graft of first and second posterolateral marginal circumflex.  1.2 Repeat CABG, October 2007 with autologous saphenous vein to the LAD and diagonal vessels.  1.3 Follow up stress in August 2015 demonstrating posterolateral ischemia  1.3 History of multiple percutaneous interventions  1.4 [facility] 07/11/18 stenting x2 RCA; Dr. Fish Hloder   1.5 Stress test, 1-, Inferolateral ischemia by scintigraphy  2. 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 Hypertension  4. Dyslipidemia  5. Dizziness       6. Fatigue      7. Echo, 1-, EF 55-57%, mild LVH, grade 1 DD, mild MR, mild AI, trivial-mild TR, pulm. Pressures high 20's    Specialty Problems        Cardiology Problems    3-vessel CAD        Angina pectoris (HCC)        Hypertension        Hypotension                HPI:  Mr. López returns for follow-up on the above.    With the addition of Plavix, a atenolol, and isosorbide he has had a marked reduction in angina.  He describes he only develops chest pain, occasionally, when he is walking out to his work shed.  He has no rest pain.  He denies orthopnea or PND.  Mr. López continues to have mild lower extremity edema when he is on his feet for prolonged periods he also describes mild to moderate orthostatic lightheadedness.    Mr. López was found to be somnolent over the last several days but his family believes this was related to benzodiazepine use.  We discussed that in some detail and I advised that that medication be completely avoided if possible because of the risk of falls, hip fracture, and subsequent  pneumonia.    The patient does not claudicate and he has no symptoms of TIA or stroke.                      PRIOR MEDICATIONS    Current Outpatient Medications on File Prior to Visit   Medication Sig Dispense Refill   • acetaminophen (TYLENOL) 500 MG tablet Take 1,000 mg by mouth 2 (Two) Times a Day. Occas. Takes tid     • aspirin (aspirin) 81 MG EC tablet Take 81 mg by mouth Daily.     • atenolol (TENORMIN) 25 MG tablet Take 1/2 tablet by mouth daily 30 tablet 6   • Cholecalciferol (Vitamin D3) 25 MCG (1000 UT) capsule Take  by mouth Daily.     • clopidogrel (Plavix) 75 MG tablet Take 1 tablet by mouth Daily. 30 tablet 6   • docusate sodium (Stool Softener) 100 MG capsule Take 100 mg by mouth Daily As Needed for Constipation.     • dorzolamide (TRUSOPT) 2 % ophthalmic solution Administer 1 drop to both eyes Daily.     • escitalopram (LEXAPRO) 10 MG tablet Take 1 tablet by mouth Daily. 90 tablet 3   • HYDROcodone-acetaminophen (NORCO) 5-325 MG per tablet Take 1 tablet by mouth Every 8 (Eight) Hours As Needed for Mild Pain .     • isosorbide mononitrate (IMDUR) 30 MG 24 hr tablet Take 1 tablet by mouth Daily. 30 tablet 6   • latanoprost (XALATAN) 0.005 % ophthalmic solution Apply 125 mcg to eye daily.     • levothyroxine (SYNTHROID, LEVOTHROID) 50 MCG tablet Take 50 mcg by mouth Daily.     • Multiple Vitamins-Minerals (MULTIVITAMIN ADULTS PO) Take  by mouth Daily.     • omeprazole (PriLOSEC) 20 MG capsule Take 20 mg by mouth Daily.     • Polyethylene Glycol 3350 (MIRALAX PO) Take  by mouth Daily As Needed.     • Acetaminophen (TYLENOL ARTHRITIS PAIN PO) Take 650 mg by mouth As Needed.     • nitroglycerin (NITROSTAT) 0.4 MG SL tablet PLACE 1 TAB UNDER TONGUE EVERY 5 MINUTES-UP TO 3 DOSES-AS NEEDED FOR CHEST PAIN 25 tablet 3     No current facility-administered medications on file prior to visit.       ALLERGIES:    Penicillins and Sulfa antibiotics    PAST MEDICAL HISTORY:    Past Medical History:   Diagnosis Date   •  "3-vessel CAD    • Angina pectoris (HCC)    • Arthritis    • Dyslipidemia    • Enlarged prostate    • Glaucoma    • Hypertension        SURGICAL HISTORY:    Past Surgical History:   Procedure Laterality Date   • CARDIAC CATHETERIZATION      [facility] 07/2018, stenting to RCA x2   • CORONARY ARTERY BYPASS GRAFT      x2   • CORONARY STENT PLACEMENT     • INSERT / REPLACE / REMOVE PACEMAKER     • PROSTATE SURGERY     • TONSILLECTOMY         SOCIAL HISTORY:    Social History     Socioeconomic History   • Marital status:    Tobacco Use   • Smoking status: Former Smoker   • Smokeless tobacco: Never Used   Substance and Sexual Activity   • Alcohol use: No   • Drug use: No   • Sexual activity: Defer       FAMILY HISTORY:    Family History   Problem Relation Age of Onset   • Stroke Mother    • Stroke Father    • Other Brother         CABG   • COPD Brother         Pulmonary Disease   • Stroke Other        Review of Systems   Constitutional: Positive for fatigue.   HENT: Negative.    Eyes: Positive for visual disturbance (glasses prn).   Respiratory: Negative.         Denies orthopnea/PND   Cardiovascular: Positive for chest pain (when out walking, only taking NTG. a couple x a week), palpitations (occas. ) and leg swelling (only minimal if up on them a lot).   Gastrointestinal: Negative.    Endocrine: Negative.    Genitourinary: Negative.    Musculoskeletal: Positive for arthralgias, gait problem (ambulates with cane) and myalgias.   Skin: Negative.    Allergic/Immunologic: Negative.    Neurological: Positive for dizziness and light-headedness. Negative for syncope.   Hematological: Bruises/bleeds easily.   Psychiatric/Behavioral: Negative.        VISIT VITALS:  Vitals:    06/09/22 1024   BP: 119/72   BP Location: Left arm   Patient Position: Sitting   Pulse: 95   SpO2: 97%   Weight: 67 kg (147 lb 12.8 oz)   Height: 175 cm (68.9\")      /72 (BP Location: Left arm, Patient Position: Sitting)   Pulse 95   Ht 175 cm " "(68.9\")   Wt 67 kg (147 lb 12.8 oz)   SpO2 97%   BMI 21.89 kg/m²     RECENT LABS:    Objective       Physical Exam  Vitals and nursing note reviewed.   Constitutional:       General: He is not in acute distress.     Appearance: He is well-developed.   HENT:      Head: Normocephalic and atraumatic.   Eyes:      Conjunctiva/sclera: Conjunctivae normal.      Pupils: Pupils are equal, round, and reactive to light.   Neck:      Vascular: No carotid bruit, hepatojugular reflux or JVD.      Trachea: No tracheal deviation.      Comments: Nl. Carotid upstrokes  Cardiovascular:      Rate and Rhythm: Normal rate and regular rhythm.      Pulses:           Radial pulses are 2+ on the right side and 2+ on the left side.      Heart sounds: S1 normal and S2 normal. Murmur heard.    Systolic murmur is present.    No friction rub. Gallop present. S4 sounds present.      Comments: Barely auscultable 1/6 apical systolic murmur  Pulmonary:      Effort: Pulmonary effort is normal.      Breath sounds: Examination of the left-lower field reveals rhonchi. Rhonchi (scattered) present. No wheezing or rales.   Abdominal:      General: Bowel sounds are normal. There is no distension or abdominal bruit.      Palpations: Abdomen is soft. There is no mass.      Tenderness: There is no abdominal tenderness. There is no guarding or rebound.      Comments: No organomegaly   Musculoskeletal:         General: No tenderness or deformity. Normal range of motion.      Cervical back: Normal range of motion and neck supple.      Right lower leg: Edema present.      Left lower leg: Edema present.      Comments: LLE, trace-1+ edema, palpable pedal pulses, very mild brianne. Stasis changes  RLE, barely any edema, palpable DP pedal pulse, PT completely nl.    Skin:     General: Skin is warm and dry.      Coloration: Skin is not pale.      Findings: No erythema or rash.   Neurological:      Mental Status: He is alert and oriented to person, place, and time. "   Psychiatric:         Behavior: Behavior normal.         Thought Content: Thought content normal.         Judgment: Judgment normal.         Procedures      Assessment & Plan   #1.  Coronary artery disease as detailed above.  Mr. López is had marked improvement in his exertional angina and has no rest symptoms.  He has refused further evaluation or invasive treatment.  As symptoms are reasonably well controlled on medications I think we will continue as above and the patient will report any change in symptoms immediately.    2.  Orthostatic dizziness.  Mr. López will continue efforts at maintaining hydration and he will minimize his use of narcotic analgesics and avoid benzodiazepines if able.    3.  Hypertension.  Blood pressures are well controlled.  No changes are indicated.    4.  Conduction system disease.  The patient has no symptoms and is due for routine pacemaker evaluation in the fall.  We will continue his present.    5.  Mr. López will follow with Dr. Bishop as instructed and we will plan on seeing him in follow-up in 6 months or on appearing basis for symptoms as discussed in detail today.   Diagnosis Plan   1. 3-vessel CAD     2. Dyslipidemia     3. Primary hypertension     4. Orthostatic hypotension     5. Shortness of breath     6. Chronic fatigue     7. Dizziness         No follow-ups on file.            Advance Care Planning   ACP discussion was held with the patient during this visit. Patient has an advance directive in EMR which is still valid.  Copy made today.        BMI is within normal parameters. No other follow-up for BMI required.             Electronically signed by:    Scribed for Fish Holder MD by Bri Duke LPN on June 9, 2022  at 10:30 EDT    IFish MD personally performed the services described in this documentation as scribed by the above named individual in my presence, and it is both accurate and complete. June 9, 2022 10:30 EDT      This note is dictated  utilizing voice recognition software.  Although this record has been proof read, transcriptional errors may still be present. If questions occur regarding the content of this record please do not hesitate to call our office.

## 2022-09-23 RX ORDER — CLOPIDOGREL BISULFATE 75 MG/1
TABLET ORAL
Qty: 30 TABLET | Refills: 6 | Status: SHIPPED | OUTPATIENT
Start: 2022-09-23 | End: 2022-12-08 | Stop reason: SDUPTHER

## 2022-10-11 ENCOUNTER — TELEPHONE (OUTPATIENT)
Dept: CARDIOLOGY | Facility: CLINIC | Age: 87
End: 2022-10-11

## 2022-10-16 DIAGNOSIS — I25.119 CORONARY ARTERY DISEASE INVOLVING NATIVE CORONARY ARTERY OF NATIVE HEART WITH ANGINA PECTORIS: ICD-10-CM

## 2022-10-17 RX ORDER — ISOSORBIDE MONONITRATE 30 MG/1
TABLET, EXTENDED RELEASE ORAL
Qty: 30 TABLET | Refills: 6 | Status: SHIPPED | OUTPATIENT
Start: 2022-10-17 | End: 2022-12-08 | Stop reason: SDUPTHER

## 2022-10-21 ENCOUNTER — OFFICE VISIT (OUTPATIENT)
Dept: CARDIOLOGY | Facility: CLINIC | Age: 87
End: 2022-10-21

## 2022-10-21 DIAGNOSIS — I45.9 CARDIAC CONDUCTION DISORDER: Primary | ICD-10-CM

## 2022-10-21 PROCEDURE — 93280 PM DEVICE PROGR EVAL DUAL: CPT | Performed by: INTERNAL MEDICINE

## 2022-10-24 ENCOUNTER — TELEPHONE (OUTPATIENT)
Dept: CARDIOLOGY | Facility: CLINIC | Age: 87
End: 2022-10-24

## 2022-10-24 NOTE — TELEPHONE ENCOUNTER
Patient in agreement to participate in remote monitoring. Monitor and adapter ordered. Daughter can be reached at [phone] If there are any questions.

## 2022-10-25 ENCOUNTER — TELEPHONE (OUTPATIENT)
Dept: CARDIOLOGY | Facility: CLINIC | Age: 87
End: 2022-10-25

## 2022-10-25 NOTE — TELEPHONE ENCOUNTER
Caller: MAGDALENA    Relationship: DAUGHTER     Best call back number: [phone]    What is the best time to reach you: ANYTIME    Who are you requesting to speak with (clinical staff, provider,  specific staff member): PROVIDER    What was the call regarding:  ON MARCH 9TH PACEMAKER HAD 4 1/2 YEARS LEFT ON THE BATTERY. Friday IT WAS STATED THE PATIENT HAD 11 MONTHS LEFT. THE OFFICE STATED THAT THEY WOULD DOUBLE CHECK WITH THE PROVIDER ABOUT THE BATTERY AND GET BACK WITH THEM BUT FAMILY HASN'T HEARD ANYTHING. ALSO CONCERNED WITH COMING BACK IN FOR 6 MONTH CHECK IF PACEMAKER WENT DOWN THAT MUCH IN 7 MONTHS. PLEASE ADVISE ON THE PACEMAKER.     Do you require a callback: YES

## 2022-11-04 ENCOUNTER — OFFICE VISIT (OUTPATIENT)
Dept: CARDIOLOGY | Facility: CLINIC | Age: 87
End: 2022-11-04

## 2022-11-04 DIAGNOSIS — I45.9 CARDIAC CONDUCTION DISORDER: Primary | ICD-10-CM

## 2022-11-04 PROCEDURE — 93280 PM DEVICE PROGR EVAL DUAL: CPT | Performed by: INTERNAL MEDICINE

## 2022-11-22 ENCOUNTER — TELEPHONE (OUTPATIENT)
Dept: CARDIOLOGY | Facility: CLINIC | Age: 87
End: 2022-11-22

## 2022-11-22 NOTE — TELEPHONE ENCOUNTER
I called patient daughter Pauline to see if he had recived his remote monitor,  He has received it, but she is waiting for her brother to come over to help them set it up.  I explained the set up process, and left me number for any further questions she may have.

## 2022-12-08 ENCOUNTER — OFFICE VISIT (OUTPATIENT)
Dept: CARDIOLOGY | Facility: CLINIC | Age: 87
End: 2022-12-08

## 2022-12-08 VITALS
BODY MASS INDEX: 22.6 KG/M2 | HEART RATE: 80 BPM | SYSTOLIC BLOOD PRESSURE: 106 MMHG | HEIGHT: 69 IN | WEIGHT: 152.6 LBS | OXYGEN SATURATION: 97 % | DIASTOLIC BLOOD PRESSURE: 51 MMHG

## 2022-12-08 DIAGNOSIS — I25.119 CORONARY ARTERY DISEASE INVOLVING NATIVE CORONARY ARTERY OF NATIVE HEART WITH ANGINA PECTORIS: ICD-10-CM

## 2022-12-08 DIAGNOSIS — R53.82 CHRONIC FATIGUE: ICD-10-CM

## 2022-12-08 DIAGNOSIS — I95.1 ORTHOSTATIC HYPOTENSION: ICD-10-CM

## 2022-12-08 DIAGNOSIS — R42 DIZZINESS: ICD-10-CM

## 2022-12-08 DIAGNOSIS — E78.5 DYSLIPIDEMIA: ICD-10-CM

## 2022-12-08 DIAGNOSIS — I10 PRIMARY HYPERTENSION: ICD-10-CM

## 2022-12-08 DIAGNOSIS — R06.02 SHORTNESS OF BREATH: ICD-10-CM

## 2022-12-08 DIAGNOSIS — I25.10 3-VESSEL CAD: Primary | ICD-10-CM

## 2022-12-08 DIAGNOSIS — R07.2 PRECORDIAL PAIN: ICD-10-CM

## 2022-12-08 PROCEDURE — 99214 OFFICE O/P EST MOD 30 MIN: CPT | Performed by: INTERNAL MEDICINE

## 2022-12-08 RX ORDER — ISOSORBIDE MONONITRATE 30 MG/1
30 TABLET, EXTENDED RELEASE ORAL DAILY
Qty: 90 TABLET | Refills: 3 | Status: SHIPPED | OUTPATIENT
Start: 2022-12-08

## 2022-12-08 RX ORDER — CLOPIDOGREL BISULFATE 75 MG/1
75 TABLET ORAL DAILY
Qty: 90 TABLET | Refills: 3 | Status: SHIPPED | OUTPATIENT
Start: 2022-12-08

## 2022-12-08 RX ORDER — ATENOLOL 25 MG/1
TABLET ORAL
Qty: 45 TABLET | Refills: 3 | Status: SHIPPED | OUTPATIENT
Start: 2022-12-08

## 2022-12-08 NOTE — PROGRESS NOTES
Subjective   Steven López is a 89 y.o. male     Chief Complaint   Patient presents with   • Follow-up     Here for 6 mo. F/u   • Coronary Artery Disease   • Hyperlipidemia   • Hypertension       PROBLEM LIST:     1. Coronary artery disease with multiple pci's in the past.  1.1 CABG, March 2003 with LIMA to LAD, saphenous vein graft of first and second posterolateral marginal circumflex.  1.2 Repeat CABG, October 2007 with autologous saphenous vein to the LAD and diagonal vessels.  1.3 Follow up stress in August 2015 demonstrating posterolateral ischemia  1.3 History of multiple percutaneous interventions  1.4 [facility] 07/11/18 stenting x2 RCA; Dr. Fish Holder   1.5 Stress test, 1-, Inferolateral ischemia by scintigraphy  2. 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 Hypertension  4. Dyslipidemia  5. Dizziness       6. Fatigue      7. Echo, 1-, EF 55-57%, mild LVH, grade 1 DD, mild MR, mild AI, trivial-mild TR, pulm. Pressures high 20's    Specialty Problems        Cardiology Problems    3-vessel CAD        Angina pectoris (HCC)        Hypertension        Hypotension             HPI:    Mr. López returns for follow-up on the above.    He continues to have exertional but not rest angina (except during episodes of protracted coughing) but his anginal threshold is not changed.  He also continues to have orthostatic lightheadedness but the symptoms have not changed and he has had no falls since his last visit here.  Mr. Aguayo denies orthopnea, PND, or change in lower extremity edema.  He senses no palpitations.  He has had no symptoms of TIA or or stroke.  He does not claudicate.  He does describe numbness in the proximal portions of both legs after lying in bed for prolonged period the symptoms are nonexertional and are symmetric.    Blood pressures and cholesterol are followed through Dr. Bishop's office.  The patient has had no issues  other than orthostasis.  Mr. López had a marked change in his battery life on device interrogations earlier this year.  He went from an estimated 4 to 6 years of battery life to 11 months.  I reviewed some of his prior interrogations and there is no change in his percent pacing which is always close to 100%.  He was recently established with [facility] and we will monitor parameters closely.                  PRIOR MEDICATIONS    Current Outpatient Medications on File Prior to Visit   Medication Sig Dispense Refill   • acetaminophen (TYLENOL) 500 MG tablet Take 1,000 mg by mouth 2 (Two) Times a Day.     • atenolol (TENORMIN) 25 MG tablet Take 1/2 tablet by mouth daily 30 tablet 6   • Calcium Carbonate (CALCIUM 600 PO) Take  by mouth Daily.     • Cholecalciferol (Vitamin D3) 25 MCG (1000 UT) capsule Take  by mouth Daily.     • clopidogrel (PLAVIX) 75 MG tablet TAKE ONE TABLET DAILY 30 tablet 6   • docusate sodium (COLACE) 100 MG capsule Take 100 mg by mouth Daily As Needed for Constipation.     • dorzolamide (TRUSOPT) 2 % ophthalmic solution Administer 1 drop to both eyes Daily.     • escitalopram (LEXAPRO) 10 MG tablet Take 1 tablet by mouth Daily. 90 tablet 3   • HYDROcodone-acetaminophen (NORCO) 5-325 MG per tablet Take 1 tablet by mouth Every 8 (Eight) Hours As Needed for Mild Pain .     • isosorbide mononitrate (IMDUR) 30 MG 24 hr tablet TAKE ONE TABLET BY MOUTH EVERY DAY 30 tablet 6   • latanoprost (XALATAN) 0.005 % ophthalmic solution Apply 125 mcg to eye daily.     • levothyroxine (SYNTHROID, LEVOTHROID) 50 MCG tablet Take 50 mcg by mouth Daily.     • Multiple Vitamins-Minerals (MULTIVITAMIN ADULTS PO) Take  by mouth Daily.     • omeprazole (PriLOSEC) 20 MG capsule Take 20 mg by mouth Daily.     • Polyethylene Glycol 3350 (MIRALAX PO) Take  by mouth Daily As Needed.     • nitroglycerin (NITROSTAT) 0.4 MG SL tablet PLACE 1 TAB UNDER TONGUE EVERY 5 MINUTES-UP TO 3 DOSES-AS NEEDED FOR CHEST PAIN 25 tablet 3     No  "current facility-administered medications on file prior to visit.       ALLERGIES:    Penicillins and Sulfa antibiotics    PAST MEDICAL HISTORY:    Past Medical History:   Diagnosis Date   • 3-vessel CAD    • Angina pectoris (HCC)    • Arthritis    • Dyslipidemia    • Enlarged prostate    • Glaucoma    • Hypertension        SURGICAL HISTORY:    Past Surgical History:   Procedure Laterality Date   • CARDIAC CATHETERIZATION      [facility] 07/2018, stenting to RCA x2   • CORONARY ARTERY BYPASS GRAFT      x2   • CORONARY STENT PLACEMENT     • INSERT / REPLACE / REMOVE PACEMAKER     • PROSTATE SURGERY     • TONSILLECTOMY         SOCIAL HISTORY:    Social History     Socioeconomic History   • Marital status:    Tobacco Use   • Smoking status: Former   • Smokeless tobacco: Never   Substance and Sexual Activity   • Alcohol use: No   • Drug use: No   • Sexual activity: Defer       FAMILY HISTORY:    Family History   Problem Relation Age of Onset   • Stroke Mother    • Stroke Father    • Other Brother         CABG   • COPD Brother         Pulmonary Disease   • Stroke Other        Review of Systems   Constitutional: Positive for fatigue.   HENT: Negative.    Eyes: Positive for visual disturbance (glasses prn).   Respiratory: Positive for shortness of breath.    Cardiovascular: Positive for chest pain, palpitations (\"very little\") and leg swelling (mildly).   Gastrointestinal: Negative.    Endocrine: Negative.    Genitourinary: Negative.    Musculoskeletal: Positive for arthralgias, gait problem (ambulates with cane) and myalgias.   Skin: Negative.    Allergic/Immunologic: Negative.    Neurological: Positive for dizziness and light-headedness. Negative for syncope.   Hematological: Bruises/bleeds easily.   Psychiatric/Behavioral: Negative.        VISIT VITALS:  Vitals:    12/08/22 0927   BP: 106/51   BP Location: Left arm   Patient Position: Sitting   Pulse: 80   SpO2: 97%   Weight: 69.2 kg (152 lb 9.6 oz)   Height: 175 cm " "(68.9\")      /51 (BP Location: Left arm, Patient Position: Sitting)   Pulse 80   Ht 175 cm (68.9\")   Wt 69.2 kg (152 lb 9.6 oz)   SpO2 97%   BMI 22.60 kg/m²     RECENT LABS:    Objective       Physical Exam  Vitals and nursing note reviewed.   Constitutional:       General: He is not in acute distress.     Appearance: He is well-developed.   HENT:      Head: Normocephalic and atraumatic.   Eyes:      Conjunctiva/sclera: Conjunctivae normal.      Pupils: Pupils are equal, round, and reactive to light.   Neck:      Vascular: Carotid bruit (very very soft rt. bruit) present. No hepatojugular reflux or JVD.      Trachea: No tracheal deviation.      Comments: Nl. Carotid upstrokes  Cardiovascular:      Rate and Rhythm: Normal rate and regular rhythm.      Pulses:           Radial pulses are 2+ on the right side and 2+ on the left side.      Heart sounds: S1 normal and S2 normal. Murmur heard.     No friction rub. Gallop present. S4 sounds present.      Comments: 1-2-6 TR  1/6 MR     Pulmonary:      Effort: Pulmonary effort is normal.      Breath sounds: Normal breath sounds. No wheezing, rhonchi or rales.      Comments: Nl. Expir. Phase  Nl. Breath sound intensity    Abdominal:      General: Bowel sounds are normal. There is no distension or abdominal bruit.      Palpations: Abdomen is soft. There is no mass.      Tenderness: There is no abdominal tenderness. There is no guarding or rebound.      Comments: No organomegaly   Musculoskeletal:         General: No tenderness or deformity. Normal range of motion.      Cervical back: Normal range of motion and neck supple.      Right lower leg: Edema present.      Left lower leg: Edema present.      Comments: BLE, trace edema at sock line, barely palpable pedal pulses     Skin:     General: Skin is warm and dry.      Coloration: Skin is not pale.      Findings: No erythema or rash.   Neurological:      Mental Status: He is alert and oriented to person, place, and " time.   Psychiatric:         Behavior: Behavior normal.         Thought Content: Thought content normal.         Judgment: Judgment normal.         Procedures      Assessment & Plan   #1.  Coronary artery disease status post bypass and multiple percutaneous interventions.  Mr. López has stable angina which is not lifestyle limiting.  We will continue current therapy and the patient will report any changes in his anginal threshold immediately.    2.  Orthostatic hypotension.  We again discussed slow positional changes, maintaining hydration etc.    3.  Conduction system disease.  We will monitor her device integrity as above.    4.  History of hypertension and dyslipidemia.  We discussed lipid-lowering therapy in the past and have decided not to pursue that despite proven benefit even in the very elderly.    5.  Mr. López will follow with Dr. Bishop as instructed, we will follow his device through transtelephonic monitoring, and we will plan on seeing him in follow-up in the office in 6 months or on appearing basis.   Diagnosis Plan   1. 3-vessel CAD        2. Dyslipidemia        3. Primary hypertension        4. Orthostatic hypotension        5. Shortness of breath        6. Chronic fatigue        7. Dizziness            No follow-ups on file.         Steven López  reports that he has quit smoking. He has never used smokeless tobacco.. I have educated him on the risk of diseases from using tobacco products such as cancer, COPD and heart disease.        Advance Care Planning   ACP discussion was held with the patient during this visit. Patient has an advance directive in EMR which is still valid.         BMI is within normal parameters. No other follow-up for BMI required.         Electronically signed by:    Scribed for Fish Holder MD by Bri Duke LPN on December 8, 2022  at 09:32 EST    I, Fish Holder MD personally performed the services described in this documentation as scribed by the above named  individual in my presence, and it is both accurate and complete. December 8, 2022 09:32 EST      Dictated Utilizing Dragon Dictation: Part of this note may be an electronic transcription/translation of spoken language to printed text using the Dragon Dictation System.

## 2023-02-25 PROCEDURE — 93296 REM INTERROG EVL PM/IDS: CPT | Performed by: INTERNAL MEDICINE

## 2023-02-25 PROCEDURE — 93294 REM INTERROG EVL PM/LDLS PM: CPT | Performed by: INTERNAL MEDICINE

## 2023-04-07 ENCOUNTER — OFFICE VISIT (OUTPATIENT)
Dept: CARDIOLOGY | Facility: CLINIC | Age: 88
End: 2023-04-07
Payer: MEDICARE

## 2023-04-07 DIAGNOSIS — I45.9 CARDIAC CONDUCTION DISORDER: ICD-10-CM

## 2023-06-19 ENCOUNTER — OFFICE VISIT (OUTPATIENT)
Dept: CARDIOLOGY | Facility: CLINIC | Age: 88
End: 2023-06-19
Payer: MEDICARE

## 2023-06-19 VITALS
HEART RATE: 89 BPM | SYSTOLIC BLOOD PRESSURE: 102 MMHG | OXYGEN SATURATION: 97 % | BODY MASS INDEX: 21.89 KG/M2 | WEIGHT: 147.8 LBS | HEIGHT: 69 IN | DIASTOLIC BLOOD PRESSURE: 55 MMHG

## 2023-06-19 DIAGNOSIS — I10 PRIMARY HYPERTENSION: ICD-10-CM

## 2023-06-19 DIAGNOSIS — I20.9 ANGINA PECTORIS: ICD-10-CM

## 2023-06-19 DIAGNOSIS — R53.82 CHRONIC FATIGUE: ICD-10-CM

## 2023-06-19 DIAGNOSIS — I25.10 3-VESSEL CAD: Primary | ICD-10-CM

## 2023-06-19 DIAGNOSIS — R06.02 SHORTNESS OF BREATH: ICD-10-CM

## 2023-06-19 DIAGNOSIS — I95.1 ORTHOSTATIC HYPOTENSION: ICD-10-CM

## 2023-06-19 DIAGNOSIS — E78.5 DYSLIPIDEMIA: ICD-10-CM

## 2023-06-19 DIAGNOSIS — R42 DIZZINESS: ICD-10-CM

## 2023-06-19 NOTE — PROGRESS NOTES
"Subjective   Steven López is a 89 y.o. male     Chief Complaint   Patient presents with    Follow-up     Here for 6 mo. F/u    Coronary Artery Disease    Hyperlipidemia    Hypertension       PROBLEM LIST:        1. Coronary artery disease with multiple pci's in the past.  1.1 CABG, March 2003 with LIMA to LAD, saphenous vein graft of first and second posterolateral marginal circumflex.  1.2 Repeat CABG, October 2007 with autologous saphenous vein to the LAD and diagonal vessels.  1.3 Follow up stress in August 2015 demonstrating posterolateral ischemia  1.3 History of multiple percutaneous interventions  1.4 [facility] 07/11/18 stenting x2 RCA; Dr. Fish Holder   1.5 Stress test, 1-, Inferolateral ischemia by scintigraphy  2. 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 Hypertension  4. Dyslipidemia  5. Dizziness       6. Fatigue      7. Echo, 1-, EF 55-57%, mild LVH, grade 1 DD, mild MR, mild AI, trivial-mild TR, pulm. Pressures high 20's    Specialty Problems          Cardiology Problems    3-vessel CAD        Angina pectoris        Hypertension        Hypotension             HPI:  Mr. Aguayo turns for follow-up on the above.    He continues to have exertional angina with a stable anginal threshold.  He does not use nitroglycerin he simply stops activity and \"relaxes\" and symptoms resolved.  He has no rest pain.  He has stable class III exertional dyspnea.  He does describe a mild increase in imbalance uses his walker frequently at home.  He uses a cane when he is out of the house.    Mr. Durán continues to have symmetric nonexertional lower extremity discomfort compatible with neuropathy.  He has no orthopnea, PND, or change in mild lower extremity edema.  He senses no palpitations.  Orthostatic lightheadedness is unchanged.  Mr. López also describes dizziness but not necessarily vertigo with changes in position of his head.  For " example, he notices this when he is shaving.    Mr. López has had no falls and no intercurrent illnesses since the time of his last visit.                        PRIOR MEDICATIONS    Current Outpatient Medications on File Prior to Visit   Medication Sig Dispense Refill    acetaminophen (TYLENOL) 500 MG tablet Take 2 tablets by mouth 2 (Two) Times a Day.      atenolol (TENORMIN) 25 MG tablet Take 1/2 tablet by mouth daily 45 tablet 3    Calcium Carbonate (CALCIUM 600 PO) Take  by mouth Daily.      Cholecalciferol (Vitamin D3) 25 MCG (1000 UT) capsule Take  by mouth Daily.      clopidogrel (PLAVIX) 75 MG tablet Take 1 tablet by mouth Daily. 90 tablet 3    docusate sodium (COLACE) 100 MG capsule Take 1 capsule by mouth Daily As Needed for Constipation.      dorzolamide (TRUSOPT) 2 % ophthalmic solution Administer 1 drop to both eyes Daily.      escitalopram (LEXAPRO) 10 MG tablet Take 1 tablet by mouth Daily. 90 tablet 3    HYDROcodone-acetaminophen (NORCO) 5-325 MG per tablet Take 1 tablet by mouth Every 8 (Eight) Hours As Needed for Mild Pain.      isosorbide mononitrate (IMDUR) 30 MG 24 hr tablet Take 1 tablet by mouth Daily. 90 tablet 3    latanoprost (XALATAN) 0.005 % ophthalmic solution Apply 125 mcg to eye daily.      levothyroxine (SYNTHROID, LEVOTHROID) 50 MCG tablet Take 1 tablet by mouth Daily.      Multiple Vitamins-Minerals (MULTIVITAMIN ADULTS PO) Take  by mouth Daily.      omeprazole (PriLOSEC) 20 MG capsule Take 1 capsule by mouth Daily.      Polyethylene Glycol 3350 (MIRALAX PO) Take  by mouth Daily As Needed.      nitroglycerin (NITROSTAT) 0.4 MG SL tablet PLACE 1 TAB UNDER TONGUE EVERY 5 MINUTES-UP TO 3 DOSES-AS NEEDED FOR CHEST PAIN (Patient not taking: Reported on 6/19/2023) 25 tablet 3     No current facility-administered medications on file prior to visit.       ALLERGIES:    Penicillins and Sulfa antibiotics    PAST MEDICAL HISTORY:    Past Medical History:   Diagnosis Date    3-vessel CAD      "Angina pectoris     Arthritis     Dyslipidemia     Enlarged prostate     Glaucoma     Hypertension        SURGICAL HISTORY:    Past Surgical History:   Procedure Laterality Date    CARDIAC CATHETERIZATION      [facility] 07/2018, stenting to RCA x2    CORONARY ARTERY BYPASS GRAFT      x2    CORONARY STENT PLACEMENT      INSERT / REPLACE / REMOVE PACEMAKER      PROSTATE SURGERY      TONSILLECTOMY         SOCIAL HISTORY:    Social History     Socioeconomic History    Marital status:    Tobacco Use    Smoking status: Former    Smokeless tobacco: Never   Substance and Sexual Activity    Alcohol use: No    Drug use: No    Sexual activity: Defer       FAMILY HISTORY:    Family History   Problem Relation Age of Onset    Stroke Mother     Stroke Father     Other Brother         CABG    COPD Brother         Pulmonary Disease    Stroke Other        Review of Systems   Constitutional:  Positive for fatigue.   HENT: Negative.     Eyes:  Positive for visual disturbance (glasses prn).   Respiratory:  Positive for shortness of breath (with exertion).    Cardiovascular:  Positive for chest pain (\"well a little bit with walking\"). Negative for palpitations and leg swelling.   Gastrointestinal:  Positive for constipation.   Endocrine: Negative.    Genitourinary: Negative.    Musculoskeletal:  Positive for arthralgias, gait problem (ambulates s with cane) and myalgias.   Skin: Negative.    Allergic/Immunologic: Positive for environmental allergies.   Neurological:  Positive for dizziness (worse) and light-headedness. Negative for syncope.   Hematological:  Bruises/bleeds easily.   Psychiatric/Behavioral: Negative.       VISIT VITALS:  Vitals:    06/19/23 0941   BP: 102/55   BP Location: Left arm   Patient Position: Sitting   Pulse: 89   SpO2: 97%   Weight: 67 kg (147 lb 12.8 oz)   Height: 175 cm (68.9\")      /55 (BP Location: Left arm, Patient Position: Sitting)   Pulse 89   Ht 175 cm (68.9\")   Wt 67 kg (147 lb 12.8 oz)   " SpO2 97%   BMI 21.89 kg/m²     RECENT LABS:    Objective       Physical Exam  Vitals and nursing note reviewed.   Constitutional:       General: He is not in acute distress.     Appearance: He is well-developed.   HENT:      Head: Normocephalic and atraumatic.   Eyes:      Conjunctiva/sclera: Conjunctivae normal.      Pupils: Pupils are equal, round, and reactive to light.   Neck:      Vascular: Carotid bruit (soft right bruit) present. No hepatojugular reflux or JVD.      Trachea: No tracheal deviation.      Comments: Nl. Carotid upstrokes  Cardiovascular:      Rate and Rhythm: Normal rate and regular rhythm.      Pulses:           Radial pulses are 2+ on the right side and 2+ on the left side.      Heart sounds: Normal heart sounds, S1 normal and S2 normal. No murmur heard.    No friction rub. S4 sounds present. No S3 sounds.   Pulmonary:      Effort: Pulmonary effort is normal.      Breath sounds: Normal breath sounds. No wheezing, rhonchi or rales.      Comments: Nl. Expir. Phase  Mildly decreased Breath sound intensity    Abdominal:      General: Bowel sounds are normal. There is no distension or abdominal bruit.      Palpations: Abdomen is soft. There is no mass.      Tenderness: There is no abdominal tenderness. There is no guarding or rebound.      Comments: No organomegaly   Musculoskeletal:         General: No tenderness or deformity. Normal range of motion.      Cervical back: Normal range of motion and neck supple.      Right lower leg: Edema present.      Left lower leg: Edema present.      Comments: LLE, trace edema, palpable DP pedal pulse, mild brianne. Stasis changes  RLE, 1+edema, palpable DP pedal pulse, mild-mod. Brianne. Stasis changes   Skin:     General: Skin is warm and dry.      Coloration: Skin is not pale.      Findings: No erythema or rash.   Neurological:      Mental Status: He is alert and oriented to person, place, and time.   Psychiatric:         Behavior: Behavior normal.         Thought  Content: Thought content normal.         Judgment: Judgment normal.       Procedures      Assessment & Plan   #1.  Coronary artery disease.  Mr. López is a stable anginal threshold.  We discussed diagnostic and treatment options and agree that, given his age, comorbidities, and stable exertional angina that we would not pursue the possibility of mechanical intervention unless symptoms were to change.    2.  Orthostatic lightheadedness.  I again reiterated slow positional changes, maintaining adequate hydration etc.    3.  Imbalance.  I think this is multifactorial with an orthostatic component, a muscle weakness component, and decreased proprioception from neuropathy    4.  Conduction system disease.  We will reinterrogate the patient's device in 3 months.    5.  Peripheral arterial disease with right neck bruit.  Previous duplex demonstrated nonobstructive disease and Mr. López has had no symptoms of TIA or stroke.  We will continue to monitor clinically.    6.  Mr. López will follow with Dr. Bishop per her instructions, we will plan on seeing him in follow-up in 1 year or on a as needed basis for any changes as discussed in detail today.   Diagnosis Plan   1. 3-vessel CAD        2. Dyslipidemia        3. Primary hypertension        4. Orthostatic hypotension        5. Angina pectoris        6. Shortness of breath        7. Chronic fatigue        8. Dizziness            No follow-ups on file.         Steven López  reports that he has quit smoking. He has never used smokeless tobacco.. I have educated him on the risk of diseases from using tobacco products such as cancer, COPD, and heart disease.            BMI is within normal parameters. No other follow-up for BMI required.       Advance Care Planning   ACP discussion was held with the patient during this visit. Patient has an advance directive in EMR which is still valid.             Electronically signed by:    Scribed for Fish Holder MD by Bri  RENATE Duke on June 19, 2023  at 10:10 EDT    I, Fish Holder MD personally performed the services described in this documentation as scribed by the above named individual in my presence, and it is both accurate and complete. June 19, 2023 10:10 EDT      Dictated Utilizing Dragon Dictation: Part of this note may be an electronic transcription/translation of spoken language to printed text using the Dragon Dictation System.

## 2023-09-18 DIAGNOSIS — I25.118 CORONARY ARTERY DISEASE OF NATIVE ARTERY OF NATIVE HEART WITH STABLE ANGINA PECTORIS: ICD-10-CM

## 2023-09-18 DIAGNOSIS — R94.39 ABNORMAL STRESS TEST: ICD-10-CM

## 2023-09-18 DIAGNOSIS — I10 ESSENTIAL HYPERTENSION: ICD-10-CM

## 2023-09-18 DIAGNOSIS — R07.2 PRECORDIAL PAIN: ICD-10-CM

## 2023-09-18 DIAGNOSIS — R06.02 SOB (SHORTNESS OF BREATH): ICD-10-CM

## 2023-09-18 RX ORDER — NITROGLYCERIN 0.4 MG/1
TABLET SUBLINGUAL
Qty: 25 TABLET | Refills: 3 | Status: SHIPPED | OUTPATIENT
Start: 2023-09-18

## 2023-09-18 NOTE — TELEPHONE ENCOUNTER
Caller: Pauline Potter    Relationship: Emergency Contact    Best call back number: [phone]    Requested Prescriptions:   Requested Prescriptions     Pending Prescriptions Disp Refills    nitroglycerin (NITROSTAT) 0.4 MG SL tablet 25 tablet 3     Sig: Take no more than 3 doses in 15 minutes.        Pharmacy where request should be sent: [address] [facility] - [phone]  - [phone]     Last office visit with prescribing clinician: 6/19/2023   Last telemedicine visit with prescribing clinician: Visit date not found   Next office visit with prescribing clinician: 6/20/2024         Does the patient have less than a 3 day supply:  [x] Yes  [] No    Would you like a call back once the refill request has been completed: [] Yes [x] No    If the office needs to give you a call back, can they leave a voicemail: [] Yes [x] No    Mitzi Bello Rep   09/18/23 08:47 EDT

## 2023-10-06 ENCOUNTER — CLINICAL SUPPORT NO REQUIREMENTS (OUTPATIENT)
Dept: CARDIOLOGY | Facility: CLINIC | Age: 88
End: 2023-10-06
Payer: MEDICARE

## 2023-10-06 DIAGNOSIS — I42.9 CARDIOMYOPATHY, UNSPECIFIED TYPE: ICD-10-CM

## 2023-10-06 DIAGNOSIS — Z95.0 PACEMAKER: Primary | ICD-10-CM

## 2023-10-06 DIAGNOSIS — I45.9 CARDIAC CONDUCTION DISORDER: ICD-10-CM

## 2023-10-06 DIAGNOSIS — I48.91 ATRIAL FIBRILLATION, UNSPECIFIED TYPE: ICD-10-CM

## 2023-10-25 ENCOUNTER — TELEPHONE (OUTPATIENT)
Dept: CARDIOLOGY | Facility: CLINIC | Age: 88
End: 2023-10-25
Payer: MEDICARE

## 2023-10-25 NOTE — TELEPHONE ENCOUNTER
Per Dr. Holder's recommendation on the pt's 10/6/23 in office device interrogation, pt to f/u in 3 months for device check not 4.  EPIC chat message sent to Sara to change this appt.

## 2023-12-28 DIAGNOSIS — I25.119 CORONARY ARTERY DISEASE INVOLVING NATIVE CORONARY ARTERY OF NATIVE HEART WITH ANGINA PECTORIS: ICD-10-CM

## 2023-12-28 RX ORDER — CLOPIDOGREL BISULFATE 75 MG/1
75 TABLET ORAL DAILY
Qty: 90 TABLET | Refills: 3 | Status: SHIPPED | OUTPATIENT
Start: 2023-12-28

## 2023-12-28 RX ORDER — ISOSORBIDE MONONITRATE 30 MG/1
30 TABLET, EXTENDED RELEASE ORAL DAILY
Qty: 90 TABLET | Refills: 3 | Status: SHIPPED | OUTPATIENT
Start: 2023-12-28

## 2024-01-23 ENCOUNTER — TELEPHONE (OUTPATIENT)
Dept: CARDIOLOGY | Facility: CLINIC | Age: 89
End: 2024-01-23
Payer: MEDICARE

## 2024-01-23 DIAGNOSIS — Z95.0 PACEMAKER: Primary | ICD-10-CM

## 2024-01-23 DIAGNOSIS — I48.91 ATRIAL FIBRILLATION, UNSPECIFIED TYPE: ICD-10-CM

## 2024-01-23 DIAGNOSIS — I25.119 CORONARY ARTERY DISEASE INVOLVING NATIVE CORONARY ARTERY OF NATIVE HEART WITH ANGINA PECTORIS: ICD-10-CM

## 2024-01-23 NOTE — TELEPHONE ENCOUNTER
Per Remote Transmission received this morning patient has less than one month on device longevity. He is pacemaker dependant. I have changed his transmissions to weekly to further monitor battery and he has a in-office appt 02/02/24 for device check. Report is in [employer] for review.

## 2024-01-24 DIAGNOSIS — R07.2 PRECORDIAL PAIN: ICD-10-CM

## 2024-01-24 RX ORDER — ATENOLOL 25 MG/1
TABLET ORAL
Qty: 45 TABLET | Refills: 3 | Status: SHIPPED | OUTPATIENT
Start: 2024-01-24

## 2024-01-26 ENCOUNTER — TELEPHONE (OUTPATIENT)
Dept: CARDIOLOGY | Facility: CLINIC | Age: 89
End: 2024-01-26
Payer: MEDICARE

## 2024-01-26 NOTE — TELEPHONE ENCOUNTER
Caller: Pauline Potter    Relationship: Emergency Contact    Best call back number: [phone]    What is the best time to reach you: ANY    Who are you requesting to speak with (clinical staff, provider,  specific staff member): CLINICAL      What was the call regarding: PT IS CHECKING ON THE STATUS OF GETTING IN TO SEE DR. CHILDS. THEY REACHED OUT TO HIS OFFICE AND THEY HAVE NO RECEIVED ANYTHING ABOUT A REFERRAL. THEY ARE KEEPING THEIR PACER CHECK ON 02.02.24, BUT DO WANT TO GET IN TO SEE HIM ASAP

## 2024-01-26 NOTE — TELEPHONE ENCOUNTER
Called daughter back and told her that I am sending everything now and will cancel the in office pacer, until after the battery change. Daughter aware.    Mitzi Boyd Rep

## 2024-01-30 ENCOUNTER — TELEPHONE (OUTPATIENT)
Dept: CARDIOLOGY | Facility: CLINIC | Age: 89
End: 2024-01-30
Payer: MEDICARE

## 2024-01-30 NOTE — TELEPHONE ENCOUNTER
Called Pauline/PETEY, she wanted to see if patient needed to keep the appointment for pacer check on 2/2/24. Advised appointment was cancelled when referral was placed. She states patient is 100% dependent and wants to see when this will be scheduled as she was told Dr Mooney was out of town.     Advised per interrogation note from 1/23/24, patient has approximately 1 month left on battery longevity, patient is pacemaker dependent. He will be scheduled in appropriate time for gen change and will receive a call when scheduled.

## 2024-01-30 NOTE — TELEPHONE ENCOUNTER
Caller: Magdalena Lam     Relationship: CHILD    Best call back number: [phone]    What is your medical concern? PACE MAKER BATTERY LIFE    How long has this issue been going on?     Is your provider already aware of this issue? YES    Have you been treated for this issue? PATIENT WILL BE TALKING TO SOMEONE FROM DR. CHILDS'S OFFICE NEXT WEEK TO GET SCHEDULED IN TO DO A PACE MAKER BATTERY CHANGE. PATIENTS DAUGHTER MAGDALENA LAM HAS SOME QUESTIONS AND WOULD LIKE DR. VALDEZ TO REACH OUT TO HER.

## 2024-02-19 ENCOUNTER — CLINICAL SUPPORT (OUTPATIENT)
Dept: CARDIOLOGY | Facility: CLINIC | Age: 89
End: 2024-02-19
Payer: MEDICARE

## 2024-02-19 VITALS
BODY MASS INDEX: 21.39 KG/M2 | SYSTOLIC BLOOD PRESSURE: 104 MMHG | DIASTOLIC BLOOD PRESSURE: 55 MMHG | HEIGHT: 69 IN | WEIGHT: 144.4 LBS | HEART RATE: 72 BPM

## 2024-02-19 NOTE — PROGRESS NOTES
Steven López  2/6/1926 2/19/2024   ?   Chief Complaint   Patient presents with    Wound Check     Wound check from a pacer change       ?   HPI:   ?   ? Pt had pacer replacement w/ Dr. Mooney on 2/9/24  ?     Current Outpatient Medications:     acetaminophen (TYLENOL) 500 MG tablet, Take 2 tablets by mouth 2 (Two) Times a Day., Disp: , Rfl:     atenolol (TENORMIN) 25 MG tablet, TAKE 1/2 TABLET BY MOUTH EVERY DAY, Disp: 45 tablet, Rfl: 3    Calcium Carbonate (CALCIUM 600 PO), Take  by mouth Daily., Disp: , Rfl:     Cholecalciferol (Vitamin D3) 25 MCG (1000 UT) capsule, Take  by mouth Daily., Disp: , Rfl:     clopidogrel (PLAVIX) 75 MG tablet, TAKE ONE TABLET BY MOUTH EVERY DAY, Disp: 90 tablet, Rfl: 3    docusate sodium (COLACE) 100 MG capsule, Take 1 capsule by mouth Daily As Needed for Constipation., Disp: , Rfl:     dorzolamide (TRUSOPT) 2 % ophthalmic solution, Administer 1 drop to both eyes Daily., Disp: , Rfl:     escitalopram (LEXAPRO) 10 MG tablet, Take 1 tablet by mouth Daily., Disp: 90 tablet, Rfl: 3    HYDROcodone-acetaminophen (NORCO) 5-325 MG per tablet, Take 1 tablet by mouth Every 8 (Eight) Hours As Needed for Mild Pain., Disp: , Rfl:     isosorbide mononitrate (IMDUR) 30 MG 24 hr tablet, TAKE ONE TABLET BY MOUTH EVERY DAY, Disp: 90 tablet, Rfl: 3    latanoprost (XALATAN) 0.005 % ophthalmic solution, Apply 125 mcg to eye daily., Disp: , Rfl:     levothyroxine (SYNTHROID, LEVOTHROID) 50 MCG tablet, Take 1 tablet by mouth Daily., Disp: , Rfl:     Multiple Vitamins-Minerals (MULTIVITAMIN ADULTS PO), Take  by mouth Daily., Disp: , Rfl:     nitroglycerin (NITROSTAT) 0.4 MG SL tablet, Take no more than 3 doses in 15 minutes., Disp: 25 tablet, Rfl: 3    omeprazole (PriLOSEC) 20 MG capsule, Take 1 capsule by mouth Daily., Disp: , Rfl:     Polyethylene Glycol 3350 (MIRALAX PO), Take  by mouth Daily As Needed., Disp: , Rfl:    ?   ?   Penicillins and Sulfa antibiotics       Procedures     ?   Assessment &  Plan    ?   ? Wound check    Pt presents in clinic for wound check post pacemaker placement on 02/09/2024.  Had pt show me the wound site:  Steri-strips still in place, advised pt not to remove the strips, but that they can trim the ends back with scissors as they peel away from the body.   No redness, swelling, pus, draining, bleeding, or bruising.   Incision site is healing well and not hot to the touch.     Advised pt that they can shower, but not to scrub or rub to area, just to pat dry.  Reiterated not to lift anything, including arm, over their head or lift more than 10-15lb for another 2-3 weeks.     Pt and family denied having any concerns and are aware to call w/ any new or worsening concerns and to continue to monitor incision site.   - Chandrika Parry MA student  -Chandrika Bloom RMA  ?

## 2024-03-15 ENCOUNTER — OFFICE VISIT (OUTPATIENT)
Dept: CARDIOLOGY | Facility: CLINIC | Age: 89
End: 2024-03-15
Payer: MEDICARE

## 2024-03-15 DIAGNOSIS — I25.10 3-VESSEL CAD: Primary | ICD-10-CM

## 2024-04-11 ENCOUNTER — TELEPHONE (OUTPATIENT)
Dept: CARDIOLOGY | Facility: CLINIC | Age: 89
End: 2024-04-11
Payer: MEDICARE

## 2024-04-11 NOTE — TELEPHONE ENCOUNTER
Caller: ElmernhiJima    Relationship: Emergency Contact    Best call back number: [phone]     What is the best time to reach you: ANYTIME    Who are you requesting to speak with (clinical staff, provider,  specific staff member): CLINICAL STAFF    Do you know the name of the person who called: MAGDALENA LAM    What was the call regarding: PATIENT HAD A TOOTH THAT HAS BROKEN OFF. THE DENTIST, WILMA LONDON (IN [place]), IS GOING TO PULL TWO TEETH. THE DENTIST IS AWARE THAT THE PATIENT IS ON PLAVIX, BUT TOLD THE FAMILY THAT THE PATIENT CAN REMAIN ON THE MEDICATION. THE PATIENT'S FAMILY WANTS TO ENSURE THIS IS CORRECT SINCE THERE WILL BE TWO EXTRACTIONS.

## 2024-04-11 NOTE — TELEPHONE ENCOUNTER
Called and spoke w/ Pauline, I confirmed w/ her that dentist is not requiring any medicines be held. I explained that if surgeon is okay w/ performing procedure on plavix that's okay. Stated we would just need to know if they want medicine held. She verbalized understanding.

## 2024-06-18 ENCOUNTER — OFFICE VISIT (OUTPATIENT)
Dept: CARDIOLOGY | Facility: CLINIC | Age: 89
End: 2024-06-18
Payer: MEDICARE

## 2024-06-18 VITALS
HEIGHT: 69 IN | BODY MASS INDEX: 21.39 KG/M2 | WEIGHT: 144.4 LBS | DIASTOLIC BLOOD PRESSURE: 67 MMHG | HEART RATE: 86 BPM | SYSTOLIC BLOOD PRESSURE: 132 MMHG | OXYGEN SATURATION: 97 %

## 2024-06-18 DIAGNOSIS — R94.39 ABNORMAL STRESS TEST: ICD-10-CM

## 2024-06-18 DIAGNOSIS — R06.02 SHORTNESS OF BREATH: ICD-10-CM

## 2024-06-18 DIAGNOSIS — I25.118 CORONARY ARTERY DISEASE OF NATIVE ARTERY OF NATIVE HEART WITH STABLE ANGINA PECTORIS: ICD-10-CM

## 2024-06-18 DIAGNOSIS — I95.1 ORTHOSTATIC HYPOTENSION: ICD-10-CM

## 2024-06-18 DIAGNOSIS — E78.5 DYSLIPIDEMIA: ICD-10-CM

## 2024-06-18 DIAGNOSIS — I10 PRIMARY HYPERTENSION: ICD-10-CM

## 2024-06-18 DIAGNOSIS — I10 ESSENTIAL HYPERTENSION: ICD-10-CM

## 2024-06-18 DIAGNOSIS — I25.10 3-VESSEL CAD: Primary | ICD-10-CM

## 2024-06-18 DIAGNOSIS — I20.9 ANGINA PECTORIS: ICD-10-CM

## 2024-06-18 DIAGNOSIS — R07.2 PRECORDIAL PAIN: ICD-10-CM

## 2024-06-18 DIAGNOSIS — R06.02 SOB (SHORTNESS OF BREATH): ICD-10-CM

## 2024-06-18 PROCEDURE — 93000 ELECTROCARDIOGRAM COMPLETE: CPT | Performed by: INTERNAL MEDICINE

## 2024-06-18 PROCEDURE — 99214 OFFICE O/P EST MOD 30 MIN: CPT | Performed by: INTERNAL MEDICINE

## 2024-06-18 RX ORDER — ATENOLOL 25 MG/1
25 TABLET ORAL DAILY
Qty: 90 TABLET | Refills: 3 | Status: SHIPPED | OUTPATIENT
Start: 2024-06-18

## 2024-06-18 RX ORDER — NITROGLYCERIN 0.4 MG/1
TABLET SUBLINGUAL
Qty: 25 TABLET | Refills: 3 | Status: SHIPPED | OUTPATIENT
Start: 2024-06-18

## 2024-06-18 NOTE — PROGRESS NOTES
Subjective   Steven López is a 89 y.o. male     Chief Complaint   Patient presents with    Follow-up     Here for yearly f/u    Hyperlipidemia    Hypertension    Coronary Artery Disease       PROBLEM LIST:     1. Coronary artery disease with multiple pci's in the past.  1.1 CABG, March 2003 with LIMA to LAD, saphenous vein graft of first and second posterolateral marginal circumflex.  1.2 Repeat CABG, October 2007 with autologous saphenous vein to the LAD and diagonal vessels.  1.3 Follow up stress in August 2015 demonstrating posterolateral ischemia  1.3 History of multiple percutaneous interventions  1.4 [facility] 07/11/18 stenting x2 RCA; Dr. Fish Holder   1.5 Stress test, 1-, Inferolateral ischemia by scintigraphy  2. 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 Hypertension  4. Dyslipidemia  5. Dizziness       6. Fatigue      7. Echo, 1-, EF 55-57%, mild LVH, grade 1 DD, mild MR, mild AI, trivial-mild TR, pulm. Pressures high 20's    Specialty Problems          Cardiology Problems    3-vessel CAD        Angina pectoris        Hypertension        Hypotension             HPI:    Mr. Aguayo returns for follow-up on the above.    He had successful generator replacement in March.  He has had several episodes of angina in the past days but states that overall the frequency of his angina has not changed over time and there is a definite waxing and waning pattern.  Both dizziness and imbalance are worse.  Dizziness seems to be more positional than orthostatic.  The patient has no nystagmus.  He denies orthopnea or PND.  He describes worsening lower extremity edema but the patient has minimal edema on exam today.    Despite dizziness and imbalance Mr. López has had very infrequent falls, his last fall being approximately 6 months ago.  He has had no intercurrent illnesses, injuries, or hospitalizations other than an generator  revision.                      PRIOR MEDICATIONS    Current Outpatient Medications on File Prior to Visit   Medication Sig Dispense Refill    acetaminophen (TYLENOL) 500 MG tablet Take 2 tablets by mouth 2 (Two) Times a Day.      atenolol (TENORMIN) 25 MG tablet TAKE 1/2 TABLET BY MOUTH EVERY DAY 45 tablet 3    Calcium Carbonate (CALCIUM 600 PO) Take  by mouth Daily.      Cholecalciferol (Vitamin D3) 25 MCG (1000 UT) capsule Take  by mouth Daily.      clopidogrel (PLAVIX) 75 MG tablet TAKE ONE TABLET BY MOUTH EVERY DAY 90 tablet 3    docusate sodium (COLACE) 100 MG capsule Take 1 capsule by mouth Daily As Needed for Constipation.      dorzolamide (TRUSOPT) 2 % ophthalmic solution Administer 1 drop to both eyes Daily.      HYDROcodone-acetaminophen (NORCO) 5-325 MG per tablet Take 1 tablet by mouth Every 8 (Eight) Hours As Needed for Mild Pain.      isosorbide mononitrate (IMDUR) 30 MG 24 hr tablet TAKE ONE TABLET BY MOUTH EVERY DAY 90 tablet 3    latanoprost (XALATAN) 0.005 % ophthalmic solution Apply 125 mcg to eye daily.      levothyroxine (SYNTHROID, LEVOTHROID) 50 MCG tablet Take 1 tablet by mouth Daily.      Multiple Vitamins-Minerals (MULTIVITAMIN ADULTS PO) Take  by mouth Daily.      nitroglycerin (NITROSTAT) 0.4 MG SL tablet Take no more than 3 doses in 15 minutes. 25 tablet 3    omeprazole (PriLOSEC) 20 MG capsule Take 1 capsule by mouth Daily.      Polyethylene Glycol 3350 (MIRALAX PO) Take  by mouth Daily As Needed.      escitalopram (LEXAPRO) 10 MG tablet Take 1 tablet by mouth Daily. 90 tablet 3     No current facility-administered medications on file prior to visit.       ALLERGIES:    Ceftin [cefuroxime], Penicillins, and Sulfa antibiotics    PAST MEDICAL HISTORY:    Past Medical History:   Diagnosis Date    3-vessel CAD     Angina pectoris     Arthritis     Dyslipidemia     Enlarged prostate     Glaucoma     Hypertension        SURGICAL HISTORY:    Past Surgical History:   Procedure Laterality Date  "   CARDIAC CATHETERIZATION      [facility] 07/2018, stenting to RCA x2    CORONARY ARTERY BYPASS GRAFT      x2    CORONARY STENT PLACEMENT      INSERT / REPLACE / REMOVE PACEMAKER      PACEMAKER REPLACEMENT      PROSTATE SURGERY      TONSILLECTOMY         SOCIAL HISTORY:    Social History     Socioeconomic History    Marital status:    Tobacco Use    Smoking status: Former    Smokeless tobacco: Never   Substance and Sexual Activity    Alcohol use: No    Drug use: No    Sexual activity: Defer       FAMILY HISTORY:    Family History   Problem Relation Age of Onset    Stroke Mother     Stroke Father     Other Brother         CABG    COPD Brother         Pulmonary Disease    Stroke Other        Review of Systems   Constitutional:  Positive for fatigue.   HENT: Negative.     Eyes:  Positive for visual disturbance (glasses).   Respiratory:  Positive for shortness of breath.    Cardiovascular:  Positive for chest pain, palpitations and leg swelling.   Gastrointestinal:  Positive for constipation and diarrhea.   Endocrine: Negative.    Genitourinary: Negative.    Musculoskeletal:  Positive for arthralgias, gait problem (ambulates with cane) and myalgias.   Skin: Negative.    Allergic/Immunologic: Negative.    Neurological:  Positive for dizziness and light-headedness. Negative for syncope.   Hematological:  Bruises/bleeds easily.   Psychiatric/Behavioral: Negative.         VISIT VITALS:  Vitals:    06/18/24 1529   BP: 132/67   BP Location: Left arm   Patient Position: Sitting   Pulse: 86   SpO2: 97%   Weight: 65.5 kg (144 lb 6.4 oz)   Height: 175 cm (68.9\")      /67 (BP Location: Left arm, Patient Position: Sitting)   Pulse 86   Ht 175 cm (68.9\")   Wt 65.5 kg (144 lb 6.4 oz)   SpO2 97%   BMI 21.39 kg/m²     RECENT LABS:    Objective       Physical Exam  Vitals and nursing note reviewed.   Constitutional:       General: He is not in acute distress.     Appearance: He is well-developed.   HENT:      Head: " Normocephalic and atraumatic.   Eyes:      Conjunctiva/sclera: Conjunctivae normal.      Pupils: Pupils are equal, round, and reactive to light.   Neck:      Vascular: No carotid bruit, hepatojugular reflux or JVD.      Trachea: No tracheal deviation.      Comments: Nl. Carotid upstrokes  Cardiovascular:      Rate and Rhythm: Normal rate and regular rhythm.      Pulses:           Radial pulses are 2+ on the right side and 2+ on the left side.      Heart sounds: Heart sounds are distant (S1 & S2). No murmur heard.     No friction rub.      Comments: PPM site clear  Pulmonary:      Effort: Pulmonary effort is normal.      Breath sounds: Normal breath sounds. No wheezing, rhonchi or rales.      Comments: Nl. Expir. Phase  Nl. Breath sound intensity  Abdominal:      General: Bowel sounds are normal. There is no distension or abdominal bruit.      Palpations: Abdomen is soft. There is no mass.      Tenderness: There is no abdominal tenderness. There is no guarding or rebound.      Comments: No organomegaly   Musculoskeletal:         General: No tenderness or deformity. Normal range of motion.      Cervical back: Normal range of motion and neck supple.      Right lower leg: Edema present.      Left lower leg: No edema.      Comments: LLE, no edema, palpable pedal pulses, mild brianne. Stasis changes  RLE, trace-1+ edema, palpable pedal pulses, mild brianne. Stasis changes   Skin:     General: Skin is warm and dry.      Coloration: Skin is not pale.      Findings: No erythema or rash.   Neurological:      Mental Status: He is alert and oriented to person, place, and time.   Psychiatric:         Behavior: Behavior normal.         Thought Content: Thought content normal.         Judgment: Judgment normal.           ECG 12 Lead    Date/Time: 6/18/2024 3:36 PM  Performed by: Fish Holder MD    Authorized by: Fish Holder MD  Comparison: compared with previous ECG from 3/9/2022  Comments: In the nonmagnetic mode the patient  demonstrates a dual-chamber pacing with 100% atrial and ventricular capture at a rate of 69.  In the magnet mode he demonstrates dual-chamber pacing at 100.  There also appears to be 100% atrial and ventricular capture.            Assessment & Plan   #1.  Coronary artery disease.  The patient is status post bypass on 2 separate occasions, has predominantly exertional angina with stenting of the right coronary artery in 2018 and inferolateral ischemia in 2021.  We had discussed previously on multiple occasions whether further invasive evaluation is indicated.  The patient continues to prefer not to pursue catheterization with the intent of PCI.  Therefore, his blood pressure has increased somewhat today in comparison with prior we will try to increase a atenolol to 25 mg daily and follow blood pressures and anginal symptoms.    2.  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  The patient had normal device function at most recent check.  We will continue to monitor.    3.  Dizziness and imbalance.  I do not see a major cardiac component of either symptom.  We will continue to monitor and the patient was given precautions again regarding pursuing maximal efforts to avoid falls.    4.  Dyslipidemia.  Patient wishes not to pursue statin therapy which I think is reasonable given his age and comorbidities.    5.  Mr. López will follow with Dr. Bishop as instructed we will plan on seeing him in follow-up in 6 months or on a as needed basis as discussed.   Diagnosis Plan   1. 3-vessel CAD        2. Angina pectoris        3. Dyslipidemia        4. Primary hypertension        5. Orthostatic hypotension        6. Shortness of breath            No follow-ups on file.         Steven López  reports that he has quit smoking. He has never used smokeless tobacco. I have educated him on the risk of diseases from using tobacco products such as cancer, COPD, and heart disease.       Advance Care  Planning   ACP discussion was held with the patient during this visit. Patient has an advance directive in EMR which is still valid.             BMI is within normal parameters. No other follow-up for BMI required.             Electronically signed by:    Scribed for Fish Holder MD by Bri Duke LPN on June 18, 2024  at 15:36 EDT    IFish MD personally performed the services described in this documentation as scribed by the above named individual in my presence, and it is both accurate and complete. June 18, 2024 15:36 EDT      Dictated Utilizing Dragon Dictation: Part of this note may be an electronic transcription/translation of spoken language to printed text using the Dragon Dictation System.

## 2024-09-09 PROCEDURE — 93294 REM INTERROG EVL PM/LDLS PM: CPT | Performed by: INTERNAL MEDICINE

## 2024-09-09 PROCEDURE — 93296 REM INTERROG EVL PM/IDS: CPT | Performed by: INTERNAL MEDICINE

## 2024-09-18 ENCOUNTER — TELEPHONE (OUTPATIENT)
Dept: CARDIOLOGY | Facility: CLINIC | Age: 89
End: 2024-09-18
Payer: MEDICARE

## 2024-11-12 NOTE — PROGRESS NOTES
ED Provider Note  [facility]      History     Chief Complaint   Patient presents with    Wound Infection     Pt reports having abscess drained in R upper arm recently. Pt presents for concerns for infection to the area.      HPI  Yun Coyle is a 46 year old female with a history of chronic hepatitis C and DM II who presents to the ED for evaluation of right arm pain and drainage.  Approximately 2 weeks ago the patient was in the ED and required vascular access consult to place an IV via ultrasound after her IV infiltrated.  Patient was then evaluated in the ED on 11/6, 6 days ago, after having progressive swelling, pain and erythema along the medial right upper arm.  A Penrose drain was placed after ultrasound showed fluid collection in the mid right upper arm.  Since then, the patient has had persistent drainage, that she describes as chocolate milk like consistency in appearance.  Patient has had a significant amount of drainage, enough to saturate her bandages daily.  Patient has been changing her bandages daily, as instructed.  Last night the patient noted to bloody globs that she pulled out around the drain.  Patient complains of significant pain around the drainage site, especially over the past 2 days.  She denies change in appearance of her skin.  The patient was prescribed a course of Bactrim but due to insurance issues has not been able to  or take this medication.  Patient has been nauseous over the past several days, which is not especially new for her.  She has had minimal food intake over the past several days.  Patient's blood sugars at home have been around 200, which is somewhat typical for her.  No fevers or other infectious symptoms.    Past Medical History  Past Medical History:   Diagnosis Date    Acute pancreatitis 06/29/2017    Anxiety     Cauda equina syndrome (H) 03/29/2013    Cyst of right ovary 11/16/2017    Depressive disorder      Pacer check.    "Gastroesophageal reflux disease     Hypertension     Hyponatremia 10/01/2023    Low back pain 08/20/2012    Overview:   History of cauda equina and surgery.      Type 2 diabetes mellitus (H)     Uncomplicated asthma      Past Surgical History:   Procedure Laterality Date    BACK SURGERY      x2    CHOLECYSTECTOMY      ORTHOPEDIC SURGERY      SALPINGO OOPHORECTOMY,R/L/ELDA Right      cloNIDine (CATAPRES) 0.1 MG tablet  glucagon 1 MG kit  glucose (BD GLUCOSE) 4 g chewable tablet  glucose 40 % (400 mg/mL) gel  losartan (COZAAR) 25 MG tablet  metFORMIN (GLUCOPHAGE) 500 MG tablet  methadone (DOLOPHINE-INTENSOL) 10 MG/ML (HIGH CONC) solution  OLANZapine (ZYPREXA) 10 MG tablet  OLANZapine zydis (ZYPREXA) 5 MG ODT  predniSONE (DELTASONE) 20 MG tablet  prochlorperazine (COMPAZINE) 25 MG suppository  sulfamethoxazole-trimethoprim (BACTRIM DS) 800-160 MG tablet      No Known Allergies  Family History  Family History   Problem Relation Age of Onset    Breast Cancer Mother         in her 50s.    Diabetes Mother     Breast Cancer Maternal Grandmother         in her 40s.    Diabetes Maternal Grandmother      Social History   Social History     Tobacco Use    Smoking status: Never    Smokeless tobacco: Never   Vaping Use    Vaping status: Never Used   Substance Use Topics    Alcohol use: No    Drug use: Not Currently     Types: Marijuana     Comment: pt was taking Oxycontin \"years ago.\" pt is now on Methadone.      Past medical history, past surgical history, medications, allergies, family history, and social history were reviewed with the patient. No additional pertinent items.   A medically appropriate review of systems was performed with pertinent positives and negatives noted in the HPI, and all other systems negative.    Physical Exam   BP: 114/82  Pulse: 72  Temp: 98  F (36.7  C)  Resp: 16  Weight: 89.3 kg (196 lb 13.9 oz)  SpO2: 97 %  Physical Exam    General: awake, alert, NAD  Head: normal cephalic  HEENT: pupils equal, " conjugate gaze intact  Neck: Supple  CV: regular rate and rhythm without murmur  Lungs: clear to auscultation  Abd: soft, non-tender, no guarding, no peritoneal signs  EXT: Right upper extremity with swelling noted around the bicep.  Penrose drain is in place.  No surrounding erythema but it is tender to palpation.  There is a significant amount of pooling purulent drainage on the dressing.  Neuro: awake, answers questions appropriately. No focal deficits noted     ED Course, Procedures, & Data      Procedures         IV Antibiotics given and/or elevated Lactate of 0 and no sepsis note found - Delete this reminder and enter the sepsis note or '.edcms' before signing chart.>>>     Results for orders placed or performed during the hospital encounter of 11/12/24   CT Humerus Upper Arm Right w Contrast     Status: None    Narrative    CT HUMERUS UPPER ARM RIGHT W CONTRAST 11/12/2024 12:42 PM    INDICATION: ongoing copious drainage from a right upper extremity  abscess.  X 6 days.  COMPARISON: Upper extremity ultrasound 11/6/2024    CONTRAST: 87 mL isovue 370  TECHNIQUE: IV contrast. Axial, sagittal and coronal thin-section  reconstruction. Dose reduction techniques were used.     FINDINGS:   Ill-defined hypoattenuating fluid with small locule of soft tissue gas  within the posterior and medial subcutaneous fat of the mid-distal arm  (series 3, images 113-160; series 7, image 33). This spans a  proximal-distal distance of approximately 12.6 cm. This does not  extend deep to the superficial muscular fascia. No fascial nodularity  or thickening. No abnormality of the underlying humerus. No osteolysis  or osseous erosive or destructive change.    Mild degenerative arthritis acromioclavicular and glenohumeral joints.    No axillary lymphadenopathy.      Impression    IMPRESSION:  1.  Ill-defined area of fluid within the subcutaneous fat of the  posterior mid-distal arm consistent with phlegmon versus early  abscess. Small  internal locule of internal gas. Correlate with any  history of recent aspiration.    2.  No evidence of underlying osteomyelitis.      JAROD HERZOG MD         SYSTEM ID:  [id number]   Basic metabolic panel     Status: Abnormal   Result Value Ref Range    Sodium 131 (L) 135 - 145 mmol/L    Potassium 4.6 3.4 - 5.3 mmol/L    Chloride 96 (L) 98 - 107 mmol/L    Carbon Dioxide (CO2) 26 22 - 29 mmol/L    Anion Gap 9 7 - 15 mmol/L    Urea Nitrogen 15.6 6.0 - 20.0 mg/dL    Creatinine 0.69 0.51 - 0.95 mg/dL    GFR Estimate >90 >60 mL/min/1.73m2    Calcium 9.4 8.8 - 10.4 mg/dL    Glucose 381 (H) 70 - 99 mg/dL   CRP inflammation     Status: Abnormal   Result Value Ref Range    CRP Inflammation 8.20 (H) <5.00 mg/L   Erythrocyte sedimentation rate auto     Status: Abnormal   Result Value Ref Range    Erythrocyte Sedimentation Rate 36 (H) 0 - 20 mm/hr   CBC with platelets and differential     Status: None   Result Value Ref Range    WBC Count 7.6 4.0 - 11.0 10e3/uL    RBC Count 4.45 3.80 - 5.20 10e6/uL    Hemoglobin 13.2 11.7 - 15.7 g/dL    Hematocrit 37.7 35.0 - 47.0 %    MCV 85 78 - 100 fL    MCH 29.7 26.5 - 33.0 pg    MCHC 35.0 31.5 - 36.5 g/dL    RDW 11.8 10.0 - 15.0 %    Platelet Count 286 150 - 450 10e3/uL    % Neutrophils 63 %    % Lymphocytes 27 %    % Monocytes 6 %    % Eosinophils 3 %    % Basophils 0 %    % Immature Granulocytes 1 %    NRBCs per 100 WBC 0 <1 /100    Absolute Neutrophils 4.8 1.6 - 8.3 10e3/uL    Absolute Lymphocytes 2.1 0.8 - 5.3 10e3/uL    Absolute Monocytes 0.4 0.0 - 1.3 10e3/uL    Absolute Eosinophils 0.2 0.0 - 0.7 10e3/uL    Absolute Basophils 0.0 0.0 - 0.2 10e3/uL    Absolute Immature Granulocytes 0.1 <=0.4 10e3/uL    Absolute NRBCs 0.0 10e3/uL   hCG qual urine POCT     Status: Normal   Result Value Ref Range    HCG Qual Urine Negative Negative    Internal QC Check POCT Valid Valid    POCT Kit Lot Number [id number]     POCT Kit Expiration Date 3/26/2026    CBC with platelets differential     Status:  None    Narrative    The following orders were created for panel order CBC with platelets differential.  Procedure                               Abnormality         Status                     ---------                               -----------         ------                     CBC with platelets and d...[[id number]]                      Final result                 Please view results for these tests on the individual orders.     Medications   sodium chloride 0.9 % bag 500mL for CT scan flush use (60 mLs Intravenous $Given 11/12/24 1230)   vancomycin (VANCOCIN) 1,250 mg in 0.9% NaCl 250 mL intermittent infusion (has no administration in time range)   vancomycin (VANCOCIN) 1,500 mg in 0.9% NaCl 250 mL intermittent infusion (1,500 mg Intravenous $New Bag 11/12/24 1147)   iopamidol (ISOVUE-370) solution 100 mL (90 mLs Intravenous $Given 11/12/24 1229)     Labs Ordered and Resulted from Time of ED Arrival to Time of ED Departure   BASIC METABOLIC PANEL - Abnormal       Result Value    Sodium 131 (*)     Potassium 4.6      Chloride 96 (*)     Carbon Dioxide (CO2) 26      Anion Gap 9      Urea Nitrogen 15.6      Creatinine 0.69      GFR Estimate >90      Calcium 9.4      Glucose 381 (*)    CRP INFLAMMATION - Abnormal    CRP Inflammation 8.20 (*)    ERYTHROCYTE SEDIMENTATION RATE AUTO - Abnormal    Erythrocyte Sedimentation Rate 36 (*)    HCG QUALITATIVE URINE POCT - Normal    HCG Qual Urine Negative      Internal QC Check POCT Valid      POCT Kit Lot Number [id number]      POCT Kit Expiration Date 3/26/2026     CBC WITH PLATELETS AND DIFFERENTIAL    WBC Count 7.6      RBC Count 4.45      Hemoglobin 13.2      Hematocrit 37.7      MCV 85      MCH 29.7      MCHC 35.0      RDW 11.8      Platelet Count 286      % Neutrophils 63      % Lymphocytes 27      % Monocytes 6      % Eosinophils 3      % Basophils 0      % Immature Granulocytes 1      NRBCs per 100 WBC 0      Absolute Neutrophils 4.8      Absolute Lymphocytes 2.1       Absolute Monocytes 0.4      Absolute Eosinophils 0.2      Absolute Basophils 0.0      Absolute Immature Granulocytes 0.1      Absolute NRBCs 0.0       CT Humerus Upper Arm Right w Contrast   Final Result   IMPRESSION:   1.  Ill-defined area of fluid within the subcutaneous fat of the   posterior mid-distal arm consistent with phlegmon versus early   abscess. Small internal locule of internal gas. Correlate with any   history of recent aspiration.      2.  No evidence of underlying osteomyelitis.         JAROD HERZOG MD            SYSTEM ID:  [id number]             Critical care was not performed.     Medical Decision Making  The patient's presentation was of high complexity (an acute health issue posing potential threat to life or bodily function).    The patient's evaluation involved:  review of external note(s) from 1 sources (see separate area of note for details)  review of 3+ test result(s) ordered prior to this encounter (see separate area of note for details)  ordering and/or review of 3+ test(s) in this encounter (see separate area of note for details)  discussion of management or test interpretation with another health professional (Campbell Huddleston MD of the general surgery service and Dr. Flanagan for transfer)    The patient's management necessitated high risk (a decision regarding emergency major procedure (OR washout for abscess)).    Assessment & Plan    Yun is a 46-year-old female who presents to the emergency department with right upper extremity pain after recent IUD and change in drainage.    On exam there is no surrounding erythema though there is some edema.  There is a significant amount of purulent drainage on her dressing.  Concerned about the amount of drainage that she is having 6 days post I&D.  Will obtain labs, imaging, IV antibiotics and likely surgical consultation.    Labs are notable for some mild hyponatremia which appears baseline for her.  She does have an elevated glucose but  normal anion gap and no acidosis on BMP.  She has an elevated sed rate elevated CRP normal white count no left shift.    CT imaging showed an ill-defined area of fluid within the subcutaneous fat consistent with phlegmon versus early abscess.  I did consult general surgery and Dr. Mullins who came and evaluated the patient.  They if he felt that she needs an OR washout but will not be able to do that on the [facility].  He will contact general surgery staff on the [facility].  Patient will be transferred ED to ED, patient was signed out to Dr. Presley ER physician who will resolve her in the [facility] ER and consult the surgical resident.  Patient is getting IV vancomycin and will be made n.p.o. 4 OR washout.    I have reviewed the nursing notes. I have reviewed the findings, diagnosis, plan and need for follow up with the patient.    New Prescriptions    No medications on file       Final diagnoses:   Abscess of arm, right     I, Mckinley Elliott, am serving as a trained medical scribe to document services personally performed by Mark Morejon MD, based on the provider's statements to me.      I, Mark Morejon MD, was physically present and have reviewed and verified the accuracy of this note documented by Mckinley Elliott.     Mark Morejon MD  [facility] EMERGENCY DEPARTMENT  11/12/2024     Mark Morejon MD  11/12/24 [id number]

## 2025-01-02 DIAGNOSIS — I25.119 CORONARY ARTERY DISEASE INVOLVING NATIVE CORONARY ARTERY OF NATIVE HEART WITH ANGINA PECTORIS: ICD-10-CM

## 2025-01-02 RX ORDER — CLOPIDOGREL BISULFATE 75 MG/1
75 TABLET ORAL DAILY
Qty: 90 TABLET | Refills: 3 | Status: SHIPPED | OUTPATIENT
Start: 2025-01-02

## 2025-01-02 RX ORDER — ISOSORBIDE MONONITRATE 30 MG/1
30 TABLET, EXTENDED RELEASE ORAL DAILY
Qty: 90 TABLET | Refills: 3 | Status: SHIPPED | OUTPATIENT
Start: 2025-01-02

## 2025-01-29 ENCOUNTER — OFFICE VISIT (OUTPATIENT)
Dept: CARDIOLOGY | Facility: CLINIC | Age: OVER 89
End: 2025-01-29
Payer: MEDICARE

## 2025-01-29 VITALS
DIASTOLIC BLOOD PRESSURE: 57 MMHG | OXYGEN SATURATION: 95 % | WEIGHT: 137.8 LBS | BODY MASS INDEX: 20.41 KG/M2 | SYSTOLIC BLOOD PRESSURE: 108 MMHG | HEIGHT: 69 IN | HEART RATE: 69 BPM

## 2025-01-29 DIAGNOSIS — R06.02 SHORTNESS OF BREATH: ICD-10-CM

## 2025-01-29 DIAGNOSIS — I10 PRIMARY HYPERTENSION: ICD-10-CM

## 2025-01-29 DIAGNOSIS — I25.10 3-VESSEL CAD: ICD-10-CM

## 2025-01-29 DIAGNOSIS — I20.9 ANGINA PECTORIS: ICD-10-CM

## 2025-01-29 DIAGNOSIS — E78.5 DYSLIPIDEMIA: ICD-10-CM

## 2025-01-29 DIAGNOSIS — I95.1 ORTHOSTATIC HYPOTENSION: Primary | ICD-10-CM

## 2025-01-29 RX ORDER — ESCITALOPRAM OXALATE 10 MG/1
5 TABLET ORAL DAILY
COMMUNITY
Start: 2025-01-20

## 2025-01-29 NOTE — PROGRESS NOTES
Subjective   Steven López is a 89 y.o. male     Chief Complaint   Patient presents with    Follow-up     Here for 6 mo. F/u    Coronary Artery Disease    Hyperlipidemia    Hypertension       PROBLEM LIST:     1. Coronary artery disease with multiple pci's in the past.  1.1 CABG, March 2003 with LIMA to LAD, saphenous vein graft of first and second posterolateral marginal circumflex.  1.2 Repeat CABG, October 2007 with autologous saphenous vein to the LAD and diagonal vessels.  1.3 Follow up stress in August 2015 demonstrating posterolateral ischemia  1.3 History of multiple percutaneous interventions  1.4 [facility] 07/11/18 stenting x2 RCA; Dr. Fish Holder   1.5 Stress test, 1-, Inferolateral ischemia by scintigraphy  2. 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 Hypertension  4. Dyslipidemia  5. Dizziness       6. Fatigue      7. Echo, 1-, EF 55-57%, mild LVH, grade 1 DD, mild MR, mild AI, trivial-mild TR, pulm. Pressures high 20's    Specialty Problems          Cardiology Problems    3-vessel CAD        Angina pectoris        Hypertension        Hypotension             HPI:    Mr. López returns for follow-up on the above..    He continues to have angina although his episodes are less frequent and less pronounced than they were at the time of his last visit.  His son questioned him regarding nitroglycerin use and states there is been a significant reduction in the need for nitroglycerin.  Mr. López has no orthopnea, PND, or change in mild lower extremity edema.  He has dizziness and lightheadedness most of which is positional in nature.  Symptoms have been chronic for years and are stable.    Blood pressures have been well-controlled.  Lipids from May of last year demonstrated total cholesterol 178, triglycerides of 334, HDL of 34, and LDL of 77.  Device interrogation has repeatedly demonstrated no atrial  fibrillation.                            PRIOR MEDICATIONS    Current Outpatient Medications on File Prior to Visit   Medication Sig Dispense Refill    acetaminophen (TYLENOL) 500 MG tablet Take 2 tablets by mouth 3 (Three) Times a Day.      atenolol (TENORMIN) 25 MG tablet Take 1 tablet by mouth Daily. (Patient taking differently: Take 0.5 tablets by mouth Daily.) 90 tablet 3    Calcium Carbonate (CALCIUM 600 PO) Take  by mouth Daily. With vit. D3      clopidogrel (PLAVIX) 75 MG tablet TAKE ONE TABLET BY MOUTH EVERY DAY 90 tablet 3    docusate sodium (COLACE) 100 MG capsule Take 1 capsule by mouth Daily As Needed for Constipation.      dorzolamide (TRUSOPT) 2 % ophthalmic solution Administer 1 drop to both eyes 2 (Two) Times a Day.      escitalopram (LEXAPRO) 10 MG tablet Take 0.5 tablets by mouth Daily.      HYDROcodone-acetaminophen (NORCO) 5-325 MG per tablet Take 1 tablet by mouth Every 8 (Eight) Hours As Needed for Mild Pain.      isosorbide mononitrate (IMDUR) 30 MG 24 hr tablet TAKE ONE TABLET BY MOUTH EVERY DAY 90 tablet 3    latanoprost (XALATAN) 0.005 % ophthalmic solution Apply 125 mcg to eye daily.      levothyroxine (SYNTHROID, LEVOTHROID) 50 MCG tablet Take 1 tablet by mouth Daily.      Multiple Vitamins-Minerals (MULTIVITAMIN ADULTS PO) Take  by mouth Daily.      omeprazole (PriLOSEC) 20 MG capsule Take 1 capsule by mouth Daily.      Polyethylene Glycol 3350 (MIRALAX PO) Take  by mouth Daily As Needed.      Cholecalciferol (Vitamin D3) 25 MCG (1000 UT) capsule Take  by mouth Daily.      nitroglycerin (NITROSTAT) 0.4 MG SL tablet Take no more than 3 doses in 15 minutes. (Patient not taking: Reported on 1/29/2025) 25 tablet 3     No current facility-administered medications on file prior to visit.       ALLERGIES:    Ceftin [cefuroxime], Penicillins, and Sulfa antibiotics    PAST MEDICAL HISTORY:    Past Medical History:   Diagnosis Date    3-vessel CAD     Angina pectoris     Arthritis      "Dyslipidemia     Enlarged prostate     Glaucoma     Hypertension        SURGICAL HISTORY:    Past Surgical History:   Procedure Laterality Date    CARDIAC CATHETERIZATION      [facility] 07/2018, stenting to RCA x2    CORONARY ARTERY BYPASS GRAFT      x2    CORONARY STENT PLACEMENT      INSERT / REPLACE / REMOVE PACEMAKER      PACEMAKER REPLACEMENT      PROSTATE SURGERY      TONSILLECTOMY         SOCIAL HISTORY:    Social History     Socioeconomic History    Marital status:    Tobacco Use    Smoking status: Former    Smokeless tobacco: Never   Substance and Sexual Activity    Alcohol use: No    Drug use: No    Sexual activity: Defer       FAMILY HISTORY:    Family History   Problem Relation Age of Onset    Stroke Mother     Stroke Father     Other Brother         CABG    COPD Brother         Pulmonary Disease    Stroke Other        Review of Systems   Constitutional:  Positive for fatigue.   HENT: Negative.     Eyes:  Positive for visual disturbance (glasses prn).   Respiratory:  Positive for shortness of breath.    Cardiovascular:  Positive for chest pain (\"occas. not as much\"), palpitations and leg swelling (mildly occas.).   Gastrointestinal: Negative.    Endocrine: Negative.    Genitourinary: Negative.    Musculoskeletal:  Positive for arthralgias, gait problem (ambulates with walking stick) and myalgias.   Skin: Negative.    Allergic/Immunologic: Negative.    Neurological:  Positive for dizziness and light-headedness. Negative for syncope.        Chronic vertigo  Imbalance   Hematological:  Bruises/bleeds easily.   Psychiatric/Behavioral: Negative.         VISIT VITALS:  Vitals:    01/29/25 1056   BP: 108/57   BP Location: Left arm   Patient Position: Sitting   Pulse: 69   SpO2: 95%   Weight: 62.5 kg (137 lb 12.8 oz)   Height: 175 cm (68.9\")      /57 (BP Location: Left arm, Patient Position: Sitting)   Pulse 69   Ht 175 cm (68.9\")   Wt 62.5 kg (137 lb 12.8 oz)   SpO2 95%   BMI 20.41 kg/m²     RECENT " LABS:    Objective       Physical Exam  Vitals and nursing note reviewed.   Constitutional:       General: He is not in acute distress.     Appearance: He is well-developed.   HENT:      Head: Normocephalic and atraumatic.   Eyes:      Conjunctiva/sclera: Conjunctivae normal.      Pupils: Pupils are equal, round, and reactive to light.   Neck:      Vascular: No carotid bruit, hepatojugular reflux or JVD.      Trachea: No tracheal deviation.      Comments: Nl. Carotid upstrokes  Cardiovascular:      Rate and Rhythm: Normal rate and regular rhythm.      Pulses:           Radial pulses are 2+ on the right side and 2+ on the left side.      Heart sounds: S1 normal and S2 normal. No murmur heard.     No friction rub. Gallop present. S3 (intermittent) sounds present. No S4 sounds.   Pulmonary:      Effort: Pulmonary effort is normal.      Breath sounds: Normal breath sounds. No wheezing, rhonchi or rales.      Comments: Nl. Expir. Phase  Nl. Breath sound intensity    Abdominal:      General: Bowel sounds are normal. There is no distension or abdominal bruit.      Palpations: Abdomen is soft. There is no mass.      Tenderness: There is no abdominal tenderness. There is no guarding or rebound.      Comments: No organomegaly   Musculoskeletal:         General: No tenderness or deformity. Normal range of motion.      Cervical back: Normal range of motion and neck supple.      Right lower leg: Edema present.      Left lower leg: Edema present.      Comments: LLE, trace edema, excellent DP pedal pulse, cap. Refill 2 sec.   RLE, trace edema, palpable pedal pulses, cap. Refill 3 sec.    Skin:     General: Skin is warm and dry.      Coloration: Skin is not pale.      Findings: No erythema or rash.   Neurological:      Mental Status: He is alert and oriented to person, place, and time.   Psychiatric:         Behavior: Behavior normal.         Thought Content: Thought content normal.         Judgment: Judgment normal.          Procedures      Assessment & Plan   #1.  Coronary artery disease.  Angina has actually improved over time.  We will continue to monitor.  Mr. López has expressed repeatedly in the past that he does not want to proceed with invasive evaluation or treatment.    2.  Chronic vertigo worsened with positional change.  I do not believe there is a cardiac component to the patient's symptoms therefore we will continue current management clinical follow-up.    3.  Controlled systemic hypertension.    4.  Dyslipidemia.  LDL is nearly at goal.  We will continue current therapy.    5.  Conduction system disease.  Device interrogations not demonstrated atrial fibrillation and normal device function.    6.  Mr. López will follow with Dr. Bishop as instructed we will plan on seeing him in follow-up in 6 to 12 months or on appearing basis as discussed.   Diagnosis Plan   1. Orthostatic hypotension        2. Primary hypertension        3. Dyslipidemia        4. Angina pectoris        5. 3-vessel CAD        6. Shortness of breath            No follow-ups on file.         Steven López  reports that he has quit smoking. He has never used smokeless tobacco. I have educated him on the risk of diseases from using tobacco products such as cancer, COPD, and heart disease.       Advance Care Planning   ACP discussion was held with the patient during this visit. Patient has an advance directive in EMR which is still valid.             BMI is within normal parameters. No other follow-up for BMI required.               Electronically signed by:    Scribed for Fish Holder MD by Bri Duke LPN on January 29, 2025  at 11:02 EST    I, Fish Holder MD personally performed the services described in this documentation as scribed by the above named individual in my presence, and it is both accurate and complete. January 29, 2025 11:02 EST      Dictated Utilizing Dragon Dictation: Part of this note may be an electronic  transcription/translation of spoken language to printed text using the Dragon Dictation System.

## 2025-03-03 ENCOUNTER — TELEPHONE (OUTPATIENT)
Dept: CARDIOLOGY | Facility: CLINIC | Age: OVER 89
End: 2025-03-03
Payer: MEDICARE

## 2025-03-03 NOTE — TELEPHONE ENCOUNTER
Caller: Pauline Potter    Relationship: Emergency Contact    Best call back number: [phone]     What is the best time to reach you: ANY     What was the call regarding: PT HAS machine THAT MONITORS PACEMAKER BESIDE BED, WONDERING IF PT REALLY NEEDS TO COME IN FOR THIS APPT ON  3/7 OR IF THIS CAN BE VIEWED FROM BEDSIDE machine. PT IS 89, WOULD LIKE TO HAVE THIS REMOTELY READ IF POSSIBLE.       Is it okay if the provider responds through MyChart: CALL

## 2025-03-03 NOTE — TELEPHONE ENCOUNTER
Called and explained to Pauline (pt's daughter) the in office interrogations is more detailed and allows the rep to make device adjustments if warranted.  She verbalized an understanding and stated they will plan to keep the appt considering this information.

## 2025-03-07 ENCOUNTER — OFFICE VISIT (OUTPATIENT)
Dept: CARDIOLOGY | Facility: CLINIC | Age: OVER 89
End: 2025-03-07
Payer: MEDICARE

## 2025-03-07 DIAGNOSIS — I45.9 CARDIAC CONDUCTION DISORDER: ICD-10-CM

## 2025-03-07 DIAGNOSIS — I25.10 3-VESSEL CAD: Primary | ICD-10-CM

## 2025-04-14 ENCOUNTER — TELEPHONE (OUTPATIENT)
Dept: CARDIOLOGY | Facility: CLINIC | Age: OVER 89
End: 2025-04-14
Payer: MEDICARE

## 2025-04-14 NOTE — TELEPHONE ENCOUNTER
Caller: [facility]    Relationship:     Best call back number: [phone]    What is the best time to reach you: ANY    Who are you requesting to speak with (clinical staff, provider,  specific staff member): CLINICAL    What was the call regarding: BILL FROM [facility] CALLED TO RELAY SOME BLOOD PRESSURE READINGS FOR THE PATIENT. TODAY IT WAS 96/50, ON 4/10 IT WAS 92/50, ON 4/8 IT WAS 98/60, ON 4/7 IT /60, AND ON 3/31 IT WAS 90/60. PLEASE CALL PATIENT TO DISCUSS FURTHER IF NEEDED. THANK YOU    Is it okay if the provider responds through [employer]: PLEASE CALL

## 2025-04-14 NOTE — TELEPHONE ENCOUNTER
LVM for patient to call back    RELAY:    Have the patient stop atenolol and report any increase in angina immediately.

## 2025-04-14 NOTE — TELEPHONE ENCOUNTER
Fish Holder MD to Me (Selected Message)    4/14/25 11:47 AM  Have the patient stop atenolol and report any increase in angina immediately.

## 2025-06-18 LAB
MDC_IDC_MSMT_BATTERY_REMAINING_LONGEVITY: 73 MO
MDC_IDC_MSMT_BATTERY_REMAINING_PERCENTAGE: 82 %
MDC_IDC_MSMT_BATTERY_RRT_TRIGGER: 2.6
MDC_IDC_MSMT_BATTERY_STATUS: NORMAL
MDC_IDC_MSMT_BATTERY_VOLTAGE: 2.99
MDC_IDC_MSMT_LEADCHNL_RA_DTM: NORMAL
MDC_IDC_MSMT_LEADCHNL_RA_IMPEDANCE_VALUE: 340
MDC_IDC_MSMT_LEADCHNL_RA_PACING_THRESHOLD_AMPLITUDE: 1
MDC_IDC_MSMT_LEADCHNL_RA_PACING_THRESHOLD_POLARITY: NORMAL
MDC_IDC_MSMT_LEADCHNL_RA_PACING_THRESHOLD_PULSEWIDTH: 0.5
MDC_IDC_MSMT_LEADCHNL_RA_SENSING_INTR_AMPL: 1.9
MDC_IDC_MSMT_LEADCHNL_RV_DTM: NORMAL
MDC_IDC_MSMT_LEADCHNL_RV_IMPEDANCE_VALUE: 350
MDC_IDC_MSMT_LEADCHNL_RV_PACING_THRESHOLD_AMPLITUDE: 1.5
MDC_IDC_MSMT_LEADCHNL_RV_PACING_THRESHOLD_POLARITY: NORMAL
MDC_IDC_MSMT_LEADCHNL_RV_PACING_THRESHOLD_PULSEWIDTH: 0.5
MDC_IDC_PG_IMPLANT_DTM: NORMAL
MDC_IDC_PG_MFG: NORMAL
MDC_IDC_PG_MODEL: NORMAL
MDC_IDC_PG_SERIAL: NORMAL
MDC_IDC_PG_TYPE: NORMAL
MDC_IDC_SESS_DTM: NORMAL
MDC_IDC_SESS_TYPE: NORMAL
MDC_IDC_SET_BRADY_AT_MODE_SWITCH_RATE: 180
MDC_IDC_SET_BRADY_LOWRATE: 70
MDC_IDC_SET_BRADY_MAX_SENSOR_RATE: 130
MDC_IDC_SET_BRADY_MAX_TRACKING_RATE: 130
MDC_IDC_SET_BRADY_MODE: NORMAL
MDC_IDC_SET_BRADY_PAV_DELAY: 180
MDC_IDC_SET_BRADY_SAV_DELAY: 140
MDC_IDC_SET_LEADCHNL_RA_PACING_AMPLITUDE: 2
MDC_IDC_SET_LEADCHNL_RA_PACING_POLARITY: NORMAL
MDC_IDC_SET_LEADCHNL_RA_PACING_PULSEWIDTH: 0.5
MDC_IDC_SET_LEADCHNL_RA_SENSING_POLARITY: NORMAL
MDC_IDC_SET_LEADCHNL_RA_SENSING_SENSITIVITY: 0.5
MDC_IDC_SET_LEADCHNL_RV_PACING_AMPLITUDE: 1.75
MDC_IDC_SET_LEADCHNL_RV_PACING_POLARITY: NORMAL
MDC_IDC_SET_LEADCHNL_RV_PACING_PULSEWIDTH: 0.5
MDC_IDC_SET_LEADCHNL_RV_SENSING_POLARITY: NORMAL
MDC_IDC_SET_LEADCHNL_RV_SENSING_SENSITIVITY: 2
MDC_IDC_STAT_AT_BURDEN_PERCENT: 0
MDC_IDC_STAT_BRADY_RA_PERCENT_PACED: 97
MDC_IDC_STAT_BRADY_RV_PERCENT_PACED: 99

## 2025-07-25 ENCOUNTER — TELEPHONE (OUTPATIENT)
Dept: CARDIOLOGY | Facility: CLINIC | Age: OVER 89
End: 2025-07-25
Payer: MEDICARE

## 2025-07-25 DIAGNOSIS — I25.119 CORONARY ARTERY DISEASE INVOLVING NATIVE CORONARY ARTERY OF NATIVE HEART WITH ANGINA PECTORIS: ICD-10-CM

## 2025-07-25 RX ORDER — ISOSORBIDE MONONITRATE 30 MG/1
15 TABLET, EXTENDED RELEASE ORAL DAILY
Qty: 30 TABLET | Refills: 5 | Status: SHIPPED | OUTPATIENT
Start: 2025-07-25

## 2025-07-25 NOTE — TELEPHONE ENCOUNTER
[facility], called to report blood pressure running low. Yesterday while at PCP office 90/60 then 88/48 at another appointment. Heart rate stays in the 50s. Patient feeling weak, dizzy and fatigued. She can be reached at [phone] with any med changes.   She confirmed he does take half tablet of Atenolol 25 mg and Isosorbide 30 mg.

## 2025-07-25 NOTE — TELEPHONE ENCOUNTER
Confirmed to decrease Isosorbide to half tablet. Farnaz made aware of recommendations. New script sent to [facility] as requested.